# Patient Record
Sex: MALE | Race: BLACK OR AFRICAN AMERICAN | NOT HISPANIC OR LATINO | Employment: FULL TIME | URBAN - METROPOLITAN AREA
[De-identification: names, ages, dates, MRNs, and addresses within clinical notes are randomized per-mention and may not be internally consistent; named-entity substitution may affect disease eponyms.]

---

## 2017-06-30 ENCOUNTER — LAB VISIT (OUTPATIENT)
Dept: LAB | Facility: HOSPITAL | Age: 47
End: 2017-06-30
Attending: INTERNAL MEDICINE
Payer: COMMERCIAL

## 2017-06-30 ENCOUNTER — OFFICE VISIT (OUTPATIENT)
Dept: FAMILY MEDICINE | Facility: CLINIC | Age: 47
End: 2017-06-30
Payer: COMMERCIAL

## 2017-06-30 VITALS
SYSTOLIC BLOOD PRESSURE: 130 MMHG | OXYGEN SATURATION: 97 % | HEART RATE: 71 BPM | DIASTOLIC BLOOD PRESSURE: 82 MMHG | TEMPERATURE: 98 F | HEIGHT: 71 IN | BODY MASS INDEX: 31.39 KG/M2 | WEIGHT: 224.19 LBS

## 2017-06-30 DIAGNOSIS — E11.9 TYPE 2 DIABETES MELLITUS WITHOUT COMPLICATION, WITHOUT LONG-TERM CURRENT USE OF INSULIN: ICD-10-CM

## 2017-06-30 DIAGNOSIS — Z00.00 ROUTINE MEDICAL EXAM: ICD-10-CM

## 2017-06-30 DIAGNOSIS — N52.9 ERECTILE DYSFUNCTION, UNSPECIFIED ERECTILE DYSFUNCTION TYPE: ICD-10-CM

## 2017-06-30 DIAGNOSIS — Z00.00 ROUTINE MEDICAL EXAM: Primary | ICD-10-CM

## 2017-06-30 DIAGNOSIS — Z23 NEED FOR VACCINATION FOR PNEUMOCOCCUS: ICD-10-CM

## 2017-06-30 DIAGNOSIS — E66.9 OBESITY, CLASS I, BMI 30-34.9: ICD-10-CM

## 2017-06-30 LAB
ALBUMIN SERPL BCP-MCNC: 4.4 G/DL
ALP SERPL-CCNC: 103 U/L
ALT SERPL W/O P-5'-P-CCNC: 29 U/L
ANION GAP SERPL CALC-SCNC: 9 MMOL/L
AST SERPL-CCNC: 26 U/L
BASOPHILS # BLD AUTO: 0.02 K/UL
BASOPHILS NFR BLD: 0.3 %
BILIRUB SERPL-MCNC: 0.6 MG/DL
BUN SERPL-MCNC: 10 MG/DL
CALCIUM SERPL-MCNC: 9.8 MG/DL
CHLORIDE SERPL-SCNC: 101 MMOL/L
CHOLEST/HDLC SERPL: 5.1 {RATIO}
CO2 SERPL-SCNC: 27 MMOL/L
CREAT SERPL-MCNC: 1.2 MG/DL
DIFFERENTIAL METHOD: NORMAL
EOSINOPHIL # BLD AUTO: 0.2 K/UL
EOSINOPHIL NFR BLD: 2.3 %
ERYTHROCYTE [DISTWIDTH] IN BLOOD BY AUTOMATED COUNT: 14 %
EST. GFR  (AFRICAN AMERICAN): >60 ML/MIN/1.73 M^2
EST. GFR  (NON AFRICAN AMERICAN): >60 ML/MIN/1.73 M^2
GLUCOSE SERPL-MCNC: 124 MG/DL
HCT VFR BLD AUTO: 45.2 %
HDL/CHOLESTEROL RATIO: 19.7 %
HDLC SERPL-MCNC: 193 MG/DL
HDLC SERPL-MCNC: 38 MG/DL
HGB BLD-MCNC: 14.7 G/DL
LDLC SERPL CALC-MCNC: 106 MG/DL
LYMPHOCYTES # BLD AUTO: 3.6 K/UL
LYMPHOCYTES NFR BLD: 47.1 %
MCH RBC QN AUTO: 28 PG
MCHC RBC AUTO-ENTMCNC: 32.5 %
MCV RBC AUTO: 86 FL
MONOCYTES # BLD AUTO: 0.7 K/UL
MONOCYTES NFR BLD: 8.9 %
NEUTROPHILS # BLD AUTO: 3.2 K/UL
NEUTROPHILS NFR BLD: 41.3 %
NONHDLC SERPL-MCNC: 155 MG/DL
PLATELET # BLD AUTO: 286 K/UL
PMV BLD AUTO: 10.2 FL
POTASSIUM SERPL-SCNC: 4.8 MMOL/L
PROT SERPL-MCNC: 8.2 G/DL
RBC # BLD AUTO: 5.25 M/UL
SODIUM SERPL-SCNC: 137 MMOL/L
TRIGL SERPL-MCNC: 245 MG/DL
WBC # BLD AUTO: 7.66 K/UL

## 2017-06-30 PROCEDURE — 80061 LIPID PANEL: CPT

## 2017-06-30 PROCEDURE — 83036 HEMOGLOBIN GLYCOSYLATED A1C: CPT

## 2017-06-30 PROCEDURE — 99396 PREV VISIT EST AGE 40-64: CPT | Mod: 25,S$GLB,, | Performed by: INTERNAL MEDICINE

## 2017-06-30 PROCEDURE — 85025 COMPLETE CBC W/AUTO DIFF WBC: CPT

## 2017-06-30 PROCEDURE — 80053 COMPREHEN METABOLIC PANEL: CPT

## 2017-06-30 PROCEDURE — 90732 PPSV23 VACC 2 YRS+ SUBQ/IM: CPT | Mod: S$GLB,,, | Performed by: INTERNAL MEDICINE

## 2017-06-30 PROCEDURE — 99999 PR PBB SHADOW E&M-EST. PATIENT-LVL III: CPT | Mod: PBBFAC,,, | Performed by: INTERNAL MEDICINE

## 2017-06-30 PROCEDURE — 90471 IMMUNIZATION ADMIN: CPT | Mod: S$GLB,,, | Performed by: INTERNAL MEDICINE

## 2017-06-30 PROCEDURE — 36415 COLL VENOUS BLD VENIPUNCTURE: CPT | Mod: PO

## 2017-06-30 RX ORDER — METFORMIN HYDROCHLORIDE 1000 MG/1
1000 TABLET ORAL 2 TIMES DAILY WITH MEALS
Qty: 180 TABLET | Refills: 3 | Status: SHIPPED | OUTPATIENT
Start: 2017-06-30 | End: 2018-08-24 | Stop reason: SDUPTHER

## 2017-06-30 RX ORDER — TADALAFIL 20 MG/1
20 TABLET ORAL DAILY
Qty: 6 TABLET | Refills: 5 | Status: SHIPPED | OUTPATIENT
Start: 2017-06-30 | End: 2019-03-20

## 2017-06-30 NOTE — PROGRESS NOTES
Pneumonia vacine 23 given tolerated well. VIS given Patient instructed to wait 15 minutes for monitoring

## 2017-06-30 NOTE — PATIENT INSTRUCTIONS
Below is a list of free Apps that you may find helpful (some of them may not apply to you since this is a general list of helpful Apps):    Android & Apple users:  Fooducate - Helps with healthy diet and weight loss  Shop Well - Scan barcodes to foods to see if it is healthy or not.  It will also suggest healthier alternatives  Lose It - Calorie tracking and goal setting for weight loss.  Peer support is also available  Calorie Counter and Diet Tracker by MyFitnessPal - Helps count calories for food intake and calories burned during exercise  Popsugar Active - has pictures and videos of preloaded workout routines  [facility] Diabetes Pal - log for home sugars, diet, weight, and blood pressure  Headspace - Guides your through meditation.  The first 10 programs are free.

## 2017-06-30 NOTE — PROGRESS NOTES
Chief Complaint  Chief Complaint   Patient presents with    Annual Exam       HPI  Christophe Carroll is a 46 y.o. male with multiple medical diagnoses as listed in the medical history and problem list that presents for routine physical.  Pt is known to me with his last appointment 08/2016.  I had seen him once before for a bursitis in 2014.  The patient was previously followed by one of my partners that has since retired .  I have reviewed their most recent previous OV with their previous PCP, most recent labs and most recent imaging studies and interpreted them myself. No acute complaints.  He reports that he wants to get himself in line with his DM follow ups.      PAST MEDICAL HISTORY:  Past Medical History:   Diagnosis Date    Diabetes mellitus type II     Hypertension        PAST SURGICAL HISTORY:  Past Surgical History:   Procedure Laterality Date    removal of epididymal cyst         SOCIAL HISTORY:  Social History     Social History    Marital status:      Spouse name: N/A    Number of children: N/A    Years of education: N/A     Occupational History     [employer]     Social History Main Topics    Smoking status: Never Smoker    Smokeless tobacco: Never Used    Alcohol use 0.0 oz/week      Comment: occasional    Drug use: No    Sexual activity: Yes     Partners: Female      Comment:      Other Topics Concern    Not on file     Social History Narrative    No narrative on file       FAMILY HISTORY:  Family History   Problem Relation Age of Onset    Diabetes Mother     Diabetes Maternal Aunt     Kidney disease Maternal Aunt     Diabetes Maternal Grandmother        ALLERGIES AND MEDICATIONS: updated and reviewed.  Review of patient's allergies indicates:   Allergen Reactions    Lisinopril Rash    Penicillins Swelling     Current Outpatient Prescriptions   Medication Sig Dispense Refill    metformin (GLUCOPHAGE) 1000 MG tablet Take 1 tablet (1,000 mg total) by mouth  2 (two) times daily with meals. 180 tablet 3    TRAVATAN Z 0.004 % Drop   6    tadalafil (CIALIS) 20 MG Tab Take 1 tablet (20 mg total) by mouth once daily. 6 tablet 5     No current facility-administered medications for this visit.          ROS  Review of Systems   Constitutional: Negative for chills, fatigue, fever and unexpected weight change.   HENT: Negative for congestion, dental problem, ear discharge, ear pain, hearing loss, postnasal drip, rhinorrhea, sinus pressure, sore throat and trouble swallowing.    Eyes: Negative for pain, discharge, redness, itching and visual disturbance.   Respiratory: Negative for cough, chest tightness, shortness of breath and wheezing.    Cardiovascular: Negative for chest pain, palpitations and leg swelling.   Gastrointestinal: Negative for abdominal distention, abdominal pain, blood in stool, constipation, diarrhea, nausea and vomiting.        No melena   Endocrine: Negative for cold intolerance, heat intolerance, polydipsia, polyphagia and polyuria.        No nocturia   Genitourinary: Negative for decreased urine volume, difficulty urinating, discharge, dysuria, flank pain, frequency, hematuria, penile pain, testicular pain and urgency.   Musculoskeletal: Negative for arthralgias, gait problem, joint swelling, myalgias, neck pain and neck stiffness.   Skin: Negative for color change, pallor and rash.   Neurological: Negative for dizziness, tremors, seizures, syncope, weakness, light-headedness and headaches.   Hematological: Negative for adenopathy. Does not bruise/bleed easily.   Psychiatric/Behavioral: Negative for confusion, decreased concentration, sleep disturbance and suicidal ideas. The patient is not nervous/anxious.         No depression           Physical Exam  Vitals:    06/30/17 1126   BP: 130/82   BP Location: Left arm   Patient Position: Sitting   BP Method: Manual   Pulse: 71   Temp: 97.8 °F (36.6 °C)   TempSrc: Oral   SpO2: 97%   Weight: 101.7 kg (224 lb  "3.3 oz)   Height: 5' 11" (1.803 m)    Body mass index is 31.27 kg/m².  Weight: 101.7 kg (224 lb 3.3 oz)   Height: 5' 11" (180.3 cm)   General appearance: alert, appears stated age, cooperative and no distress  Head: Normocephalic, without obvious abnormality, atraumatic  Eyes: PERRL EOMI conjunctiva clear  Ears: normal TM's and external ear canals both ears  Nose: Nares normal. Septum midline. Mucosa normal. No drainage or sinus tenderness.  Throat: lips, mucosa, and tongue normal; teeth and gums normal  Neck: no adenopathy, no carotid bruit, no JVD, supple, symmetrical, trachea midline and thyroid not enlarged, symmetric, no tenderness/mass/nodules  Back: symmetric, no curvature. ROM normal. No CVA tenderness.  Lungs: clear to auscultation bilaterally  Heart: regular rate and rhythm, S1, S2 normal, no murmur, click, rub or gallop  Abdomen: soft, non-tender; bowel sounds normal; no masses,  no organomegaly  Extremities: extremities normal, atraumatic, no cyanosis or edema  Pulses: 2+ and symmetric  Neurologic: Mental status: Alert, oriented, thought content appropriate  Sensory: normal  Motor:grossly normal  Coordination: normal  Gait: Normal  Symmetric facial movements palate elevated symmetrically tongue midline   Protective Sensation (w/ 10 gram monofilament):  Right: Intact  Left: Intact    Visual Inspection:  Normal -  Bilateral    Pedal Pulses:   Right: Present  Left: Present    Posterior tibialis:   Right:Present  Left: Present           Health Maintenance       Date Due Completion Date    TETANUS VACCINE 09/28/1989 ---    Pneumococcal PPSV23 (Medium Risk) (1) 09/28/1989 ---    Urine Microalbumin 12/04/2014 12/4/2013    Hemoglobin A1c 02/23/2017 8/23/2016    Foot Exam 08/23/2017 8/23/2016    Influenza Vaccine 08/01/2017 ---    Lipid Panel 08/23/2017 8/23/2016    Eye Exam 11/30/2017 11/30/2016            Assessment & Plan  Problem List Items Addressed This Visit        Renal    Erectile dysfunction    Current " Assessment & Plan     Mostly performance anxiety.  Trial of cialis as well         Relevant Medications    tadalafil (CIALIS) 20 MG Tab       Endocrine    Type 2 diabetes mellitus without complication, without long-term current use of insulin    Current Assessment & Plan     Recheck labs.  Will adjust medications as needed.  Eye exam in Nov.           Relevant Medications    metformin (GLUCOPHAGE) 1000 MG tablet    Other Relevant Orders    Microalbumin/creatinine urine ratio       Fluids/Electrolytes/Nutrition/GI    Obesity, Class I, BMI 30-34.9    Current Assessment & Plan     The patient is asked to make an attempt to improve diet and exercise patterns to aid in medical management of this problem.            Other Visit Diagnoses     Routine medical exam    -  Primary  -    Discussed healthy diet, regular exercise, necessary labs, age appropriate cancer screening, and routine vaccinations.       Relevant Orders    CBC auto differential    Comprehensive metabolic panel    Lipid panel    Hemoglobin A1c    Need for vaccination for pneumococcus    -  vaccinate    Relevant Orders    Pneumococcal Polysaccharide Vaccine (23 Valent) (SQ/IM)            Health Maintenance reviewed, as above.    Follow-up: Return for 3-6 months pending lab.

## 2017-07-01 LAB
ESTIMATED AVG GLUCOSE: 166 MG/DL
HBA1C MFR BLD HPLC: 7.4 %

## 2017-07-03 ENCOUNTER — PATIENT MESSAGE (OUTPATIENT)
Dept: FAMILY MEDICINE | Facility: CLINIC | Age: 47
End: 2017-07-03

## 2017-07-03 DIAGNOSIS — E78.5 HYPERLIPIDEMIA ASSOCIATED WITH TYPE 2 DIABETES MELLITUS: ICD-10-CM

## 2017-07-03 DIAGNOSIS — E11.69 HYPERLIPIDEMIA ASSOCIATED WITH TYPE 2 DIABETES MELLITUS: ICD-10-CM

## 2017-07-03 DIAGNOSIS — E11.9 TYPE 2 DIABETES MELLITUS WITHOUT COMPLICATION, WITHOUT LONG-TERM CURRENT USE OF INSULIN: ICD-10-CM

## 2017-07-03 RX ORDER — ATORVASTATIN CALCIUM 20 MG/1
20 TABLET, FILM COATED ORAL DAILY
Qty: 90 TABLET | Refills: 3 | Status: SHIPPED | OUTPATIENT
Start: 2017-07-03 | End: 2018-08-24 | Stop reason: SDUPTHER

## 2017-07-06 ENCOUNTER — PATIENT MESSAGE (OUTPATIENT)
Dept: FAMILY MEDICINE | Facility: CLINIC | Age: 47
End: 2017-07-06

## 2017-07-06 DIAGNOSIS — E11.9 TYPE 2 DIABETES MELLITUS WITHOUT COMPLICATION, WITHOUT LONG-TERM CURRENT USE OF INSULIN: Primary | ICD-10-CM

## 2017-07-07 ENCOUNTER — PATIENT MESSAGE (OUTPATIENT)
Dept: FAMILY MEDICINE | Facility: CLINIC | Age: 47
End: 2017-07-07

## 2017-07-07 DIAGNOSIS — E11.9 TYPE 2 DIABETES MELLITUS WITHOUT COMPLICATION, WITHOUT LONG-TERM CURRENT USE OF INSULIN: ICD-10-CM

## 2018-01-19 DIAGNOSIS — E11.9 TYPE 2 DIABETES MELLITUS WITHOUT COMPLICATION: ICD-10-CM

## 2018-04-16 ENCOUNTER — OFFICE VISIT (OUTPATIENT)
Dept: FAMILY MEDICINE | Facility: CLINIC | Age: 48
End: 2018-04-16
Payer: COMMERCIAL

## 2018-04-16 VITALS
TEMPERATURE: 98 F | BODY MASS INDEX: 32.03 KG/M2 | DIASTOLIC BLOOD PRESSURE: 78 MMHG | OXYGEN SATURATION: 99 % | HEIGHT: 71 IN | SYSTOLIC BLOOD PRESSURE: 126 MMHG | HEART RATE: 82 BPM | WEIGHT: 228.81 LBS

## 2018-04-16 DIAGNOSIS — S39.012A LUMBAR STRAIN, INITIAL ENCOUNTER: Primary | ICD-10-CM

## 2018-04-16 PROCEDURE — 3074F SYST BP LT 130 MM HG: CPT | Mod: CPTII,S$GLB,, | Performed by: INTERNAL MEDICINE

## 2018-04-16 PROCEDURE — 3078F DIAST BP <80 MM HG: CPT | Mod: CPTII,S$GLB,, | Performed by: INTERNAL MEDICINE

## 2018-04-16 PROCEDURE — 99213 OFFICE O/P EST LOW 20 MIN: CPT | Mod: S$GLB,,, | Performed by: INTERNAL MEDICINE

## 2018-04-16 PROCEDURE — 99999 PR PBB SHADOW E&M-EST. PATIENT-LVL III: CPT | Mod: PBBFAC,,, | Performed by: INTERNAL MEDICINE

## 2018-04-16 RX ORDER — CYCLOBENZAPRINE HCL 10 MG
10 TABLET ORAL NIGHTLY
Qty: 7 TABLET | Refills: 0 | Status: SHIPPED | OUTPATIENT
Start: 2018-04-16 | End: 2018-04-23

## 2018-04-16 NOTE — PROGRESS NOTES
This note was created by combination of typed  and Dragon dictation.  Transcription errors may be present.  If there are any questions, please contact me.    Assessment & Plan:   Lumbar strain, initial encounter - not c/w sciatica.  Trial of flexeril nighttime use. Home PT handout.  May continue ibuprofen 800 mg BID to TID with food. Cool packs.   -     cyclobenzaprine (FLEXERIL) 10 MG tablet; Take 1 tablet (10 mg total) by mouth every evening.  Dispense: 7 tablet; Refill: 0        There are no discontinued medications.  Modified Medications    No medications on file     New Prescriptions    CYCLOBENZAPRINE (FLEXERIL) 10 MG TABLET    Take 1 tablet (10 mg total) by mouth every evening.       Follow Up: No Follow-up on file.        Subjective:     Chief Complaint   Patient presents with    Back Pain       HPI  Christophe is a 47 y.o. male, last appointment with this clinic was Visit date not found.    Yesterday around noon was in the kitchen facing the sink, turned to his right to the stove and sudden onset of pain.  Was not carrying anythging.  Similar episode a few years ago which was limited.  But this did not stop.  Hard for him to walk and sit initially. Today able to sit.  Only able to lie down yesterday.  Hard to turn onto his sides.  Had ibuprofen 800 mg and took 1 yesterday with minimal relief. 2 hours later - took a tylenol.  Pain all night long.  Took ibuprofen this AM. Compared to yesterday not as painful.  It was a sharp pain.  On the side, lateral to the spine.  Nonradiating.  The previous instance of this - was more in the lower medial spine and this is more lateral.  No radiation inferiorly nor to the groin. No pain with urination or BM no numbness.      Patient Care Team:  Moris Menchaca MD as PCP - General (Internal Medicine)    Patient Active Problem List    Diagnosis Date Noted    Obesity, Class I, BMI 30-34.9 08/23/2016    Erectile dysfunction 10/24/2014    Type 2 diabetes mellitus  without complication, without long-term current use of insulin 11/02/2012    Hyperlipidemia associated with type 2 diabetes mellitus 11/02/2012       PAST MEDICAL HISTORY:  Past Medical History:   Diagnosis Date    Diabetes mellitus type II     Hypertension        PAST SURGICAL HISTORY:  Past Surgical History:   Procedure Laterality Date    removal of epididymal cyst         SOCIAL HISTORY:  Social History     Social History    Marital status:      Spouse name: N/A    Number of children: N/A    Years of education: N/A     Occupational History     [employer]     Social History Main Topics    Smoking status: Never Smoker    Smokeless tobacco: Never Used    Alcohol use 0.0 oz/week      Comment: occasional    Drug use: No    Sexual activity: Yes     Partners: Female      Comment:      Other Topics Concern    Not on file     Social History Narrative    No narrative on file       ALLERGIES AND MEDICATIONS: updated and reviewed.  Review of patient's allergies indicates:   Allergen Reactions    Lisinopril Rash    Penicillins Swelling     Current Outpatient Prescriptions   Medication Sig Dispense Refill    atorvastatin (LIPITOR) 20 MG tablet Take 1 tablet (20 mg total) by mouth once daily. 90 tablet 3    linagliptin (TRADJENTA) 5 mg Tab tablet Take 1 tablet (5 mg total) by mouth once daily. 90 tablet 3    metformin (GLUCOPHAGE) 1000 MG tablet Take 1 tablet (1,000 mg total) by mouth 2 (two) times daily with meals. 180 tablet 3    TRAVATAN Z 0.004 % Drop   6    tadalafil (CIALIS) 20 MG Tab Take 1 tablet (20 mg total) by mouth once daily. 6 tablet 5     No current facility-administered medications for this visit.        Review of Systems   Constitutional: Negative for chills and fever.   Gastrointestinal: Negative for abdominal pain.   Genitourinary: Negative for dysuria.       Objective:   Physical Exam   Vitals:    04/16/18 1045   BP: 126/78   Pulse: 82   Temp: 97.9 °F (36.6 °C)  "  TempSrc: Oral   SpO2: 99%   Weight: 103.8 kg (228 lb 13.4 oz)   Height: 5' 11" (1.803 m)    Body mass index is 31.92 kg/m².  Weight: 103.8 kg (228 lb 13.4 oz)   Height: 5' 11" (180.3 cm)     Physical Exam   Constitutional: He is oriented to person, place, and time. He appears well-developed and well-nourished. No distress.   Musculoskeletal:   Right paraspinal muscle laterally is TTP, superior to the SI joint, the SI joint nontender, the L spine nontender.  Straight leg raise with pain localizing in the left lateral paraspinal muscle area.   Neurological: He is alert and oriented to person, place, and time.   Psychiatric: He has a normal mood and affect. His behavior is normal. Thought content normal.     "

## 2018-05-02 ENCOUNTER — OFFICE VISIT (OUTPATIENT)
Dept: FAMILY MEDICINE | Facility: CLINIC | Age: 48
End: 2018-05-02
Payer: COMMERCIAL

## 2018-05-02 VITALS
OXYGEN SATURATION: 99 % | WEIGHT: 230 LBS | BODY MASS INDEX: 32.2 KG/M2 | TEMPERATURE: 98 F | DIASTOLIC BLOOD PRESSURE: 70 MMHG | SYSTOLIC BLOOD PRESSURE: 138 MMHG | HEIGHT: 71 IN | HEART RATE: 110 BPM

## 2018-05-02 DIAGNOSIS — M54.50 ACUTE RIGHT-SIDED LOW BACK PAIN WITHOUT SCIATICA: Primary | ICD-10-CM

## 2018-05-02 DIAGNOSIS — E11.9 TYPE 2 DIABETES MELLITUS WITHOUT COMPLICATION, WITHOUT LONG-TERM CURRENT USE OF INSULIN: ICD-10-CM

## 2018-05-02 DIAGNOSIS — E78.5 HYPERLIPIDEMIA ASSOCIATED WITH TYPE 2 DIABETES MELLITUS: ICD-10-CM

## 2018-05-02 DIAGNOSIS — E11.69 HYPERLIPIDEMIA ASSOCIATED WITH TYPE 2 DIABETES MELLITUS: ICD-10-CM

## 2018-05-02 PROCEDURE — 3075F SYST BP GE 130 - 139MM HG: CPT | Mod: CPTII,S$GLB,, | Performed by: INTERNAL MEDICINE

## 2018-05-02 PROCEDURE — 3045F PR MOST RECENT HEMOGLOBIN A1C LEVEL 7.0-9.0%: CPT | Mod: CPTII,S$GLB,, | Performed by: INTERNAL MEDICINE

## 2018-05-02 PROCEDURE — 3078F DIAST BP <80 MM HG: CPT | Mod: CPTII,S$GLB,, | Performed by: INTERNAL MEDICINE

## 2018-05-02 PROCEDURE — 99999 PR PBB SHADOW E&M-EST. PATIENT-LVL III: CPT | Mod: PBBFAC,,, | Performed by: INTERNAL MEDICINE

## 2018-05-02 PROCEDURE — 96372 THER/PROPH/DIAG INJ SC/IM: CPT | Mod: 59,S$GLB,, | Performed by: INTERNAL MEDICINE

## 2018-05-02 PROCEDURE — 99214 OFFICE O/P EST MOD 30 MIN: CPT | Mod: 25,S$GLB,, | Performed by: INTERNAL MEDICINE

## 2018-05-02 RX ORDER — TIZANIDINE 4 MG/1
4 TABLET ORAL NIGHTLY PRN
Qty: 30 TABLET | Refills: 0 | Status: SHIPPED | OUTPATIENT
Start: 2018-05-02 | End: 2018-05-12

## 2018-05-02 RX ORDER — DICLOFENAC SODIUM 50 MG/1
50 TABLET, DELAYED RELEASE ORAL 2 TIMES DAILY
Qty: 60 TABLET | Refills: 1 | Status: SHIPPED | OUTPATIENT
Start: 2018-05-02 | End: 2019-01-02

## 2018-05-02 RX ORDER — KETOROLAC TROMETHAMINE 30 MG/ML
60 INJECTION, SOLUTION INTRAMUSCULAR; INTRAVENOUS
Status: COMPLETED | OUTPATIENT
Start: 2018-05-02 | End: 2018-05-02

## 2018-05-02 RX ADMIN — KETOROLAC TROMETHAMINE 60 MG: 30 INJECTION, SOLUTION INTRAMUSCULAR; INTRAVENOUS at 11:05

## 2018-05-02 NOTE — ASSESSMENT & PLAN NOTE
Recheck labs.  The patient is asked to make an attempt to improve diet and exercise patterns to aid in medical management of this problem.

## 2018-05-02 NOTE — PROGRESS NOTES
Assessment & Plan  Problem List Items Addressed This Visit        Cardiac/Vascular    Hyperlipidemia associated with type 2 diabetes mellitus (Chronic)    Current Assessment & Plan     Recheck labs         Relevant Orders    Comprehensive metabolic panel    Lipid panel       Endocrine    Type 2 diabetes mellitus without complication, without long-term current use of insulin (Chronic)    Current Assessment & Plan     Recheck labs.  The patient is asked to make an attempt to improve diet and exercise patterns to aid in medical management of this problem.          Relevant Orders    Hemoglobin A1c    Microalbumin/creatinine urine ratio      Other Visit Diagnoses     Acute right-sided low back pain without sciatica    -  Primary  -    Toradol now.  Stop flexeril and ibuprofen due to poor response.  Will change to diclofenac and tizanidine.  Counseled on heat and Salonpas.  Start new type of physician directed home PT.      Relevant Medications    ketorolac injection 60 mg (Completed)    diclofenac (VOLTAREN) 50 MG EC tablet    tiZANidine (ZANAFLEX) 4 MG tablet            Health Maintenance reviewed, as above.    Follow-up: Follow-up for pending labs.    ______________________________________________________________________    Chief Complaint  Chief Complaint   Patient presents with    Back Pain     f/u    Diabetes     f/u    Hyperlipidemia     f/u       HPI  Christophe Eugene Carroll is a 47 y.o. male with multiple medical diagnoses as listed in the medical history and problem list that presents for HLD and DM follow up.  Pt is known to me with his last appointment 4/16/2018.  He was last seen by me on 06/2017    He reports that he had a twisting injury that occurred with immediate onset of pain.  He was seen by one of my partners and was prescribed flexeril and ibuprofen 800mg.  He states that the ibuprofen is helping only a little.  Flexeril seems to allow sleep but doesn't help during the day.  Using heat without much  improvement.  Low back stretches are now causing some pain.  No fevers, saddle anesthesia, urinary/stool incontinence, LE numbness or weakness.    He is taking and tolerating his diabetes medications.  Has been off of his diet and exercise routine since Dec.  Got sick and out of the habit.  No hypoglycemic symptoms.  No CP, SOB, palpitations, polyuria, polydipsia, polyphagia      PAST MEDICAL HISTORY:  Past Medical History:   Diagnosis Date    Diabetes mellitus type II     Hypertension        PAST SURGICAL HISTORY:  Past Surgical History:   Procedure Laterality Date    removal of epididymal cyst         SOCIAL HISTORY:  Social History     Social History    Marital status:      Spouse name: N/A    Number of children: N/A    Years of education: N/A     Occupational History     [employer]     Social History Main Topics    Smoking status: Never Smoker    Smokeless tobacco: Never Used    Alcohol use 0.0 oz/week      Comment: occasional    Drug use: No    Sexual activity: Yes     Partners: Female      Comment:      Other Topics Concern    Not on file     Social History Narrative    No narrative on file       FAMILY HISTORY:  Family History   Problem Relation Age of Onset    Diabetes Mother     Diabetes Maternal Aunt     Kidney disease Maternal Aunt     Diabetes Maternal Grandmother        ALLERGIES AND MEDICATIONS: updated and reviewed.  Review of patient's allergies indicates:   Allergen Reactions    Lisinopril Rash    Penicillins Swelling     Current Outpatient Prescriptions   Medication Sig Dispense Refill    atorvastatin (LIPITOR) 20 MG tablet Take 1 tablet (20 mg total) by mouth once daily. 90 tablet 3    linagliptin (TRADJENTA) 5 mg Tab tablet Take 1 tablet (5 mg total) by mouth once daily. 90 tablet 3    metformin (GLUCOPHAGE) 1000 MG tablet Take 1 tablet (1,000 mg total) by mouth 2 (two) times daily with meals. 180 tablet 3    TRAVATAN Z 0.004 % Drop   6    diclofenac  "(VOLTAREN) 50 MG EC tablet Take 1 tablet (50 mg total) by mouth 2 (two) times daily. Take with food 60 tablet 1    tadalafil (CIALIS) 20 MG Tab Take 1 tablet (20 mg total) by mouth once daily. 6 tablet 5    tiZANidine (ZANAFLEX) 4 MG tablet Take 1 tablet (4 mg total) by mouth nightly as needed. 30 tablet 0     No current facility-administered medications for this visit.          ROS  Review of Systems   Constitutional: Negative for chills, fever and unexpected weight change.   HENT: Negative for congestion, dental problem, ear pain, hearing loss, rhinorrhea, sore throat and trouble swallowing.    Eyes: Negative for discharge, redness and visual disturbance.   Respiratory: Negative for cough, chest tightness, shortness of breath and wheezing.    Cardiovascular: Negative for chest pain, palpitations and leg swelling.   Gastrointestinal: Negative for abdominal pain, constipation, diarrhea, nausea and vomiting.   Endocrine: Negative for polydipsia, polyphagia and polyuria.   Genitourinary: Negative for decreased urine volume, dysuria and hematuria.   Musculoskeletal: Positive for back pain. Negative for arthralgias and myalgias.   Skin: Negative for color change and rash.   Neurological: Negative for dizziness, weakness, light-headedness and headaches.   Psychiatric/Behavioral: Negative for decreased concentration, dysphoric mood, sleep disturbance and suicidal ideas.           Physical Exam  Vitals:    05/02/18 1110   BP: 138/70   Pulse: 110   Temp: 98 °F (36.7 °C)   SpO2: 99%   Weight: 104.3 kg (230 lb)   Height: 5' 11" (1.803 m)    Body mass index is 32.08 kg/m².  Weight: 104.3 kg (230 lb)   Height: 5' 11" (180.3 cm)   Physical Exam   Constitutional: He is oriented to person, place, and time. He appears well-developed and well-nourished. No distress.   HENT:   Head: Normocephalic and atraumatic.   Eyes: Conjunctivae, EOM and lids are normal. Pupils are equal, round, and reactive to light. No scleral icterus. "   Neck: Full passive range of motion without pain. Neck supple. No JVD present. Carotid bruit is not present. No thyromegaly present.   Cardiovascular: Normal rate, regular rhythm, normal heart sounds and intact distal pulses.  Exam reveals no S3, no S4 and no friction rub.    No murmur heard.  Pulmonary/Chest: Effort normal and breath sounds normal. He has no wheezes. He has no rhonchi. He has no rales.   Abdominal: Soft. Bowel sounds are normal. There is no tenderness.   Musculoskeletal: He exhibits no edema.        Cervical back: He exhibits no tenderness and no bony tenderness.        Thoracic back: He exhibits no tenderness and no bony tenderness.        Lumbar back: He exhibits tenderness (right paraspinous and lumbar). He exhibits no bony tenderness.   Lymphadenopathy:        Head (right side): No submental and no submandibular adenopathy present.        Head (left side): No submental and no submandibular adenopathy present.     He has no cervical adenopathy.        Right: No supraclavicular adenopathy present.        Left: No supraclavicular adenopathy present.   Neurological: He is alert and oriented to person, place, and time.   Motor grossly intact.  Sensory grossly intact.  Symmetric facial movements palate elevated symmetrically tongue midline   Skin: Skin is warm and dry. No rash noted.   Psychiatric: He has a normal mood and affect. His speech is normal and behavior is normal. Thought content normal.         Health Maintenance       Date Due Completion Date    TETANUS VACCINE 09/28/1989 ---    Hemoglobin A1c 12/30/2017 6/30/2017    Foot Exam 06/30/2018 6/30/2017    Urine Microalbumin 06/30/2018 6/30/2017    Lipid Panel 06/30/2018 6/30/2017    Eye Exam 07/10/2018 7/10/2017    Influenza Vaccine 08/01/2018 ---    Low Dose Statin 04/16/2019 4/16/2018    Pneumococcal PPSV23 (Medium Risk) (2) 09/28/2036 6/30/2017

## 2018-05-04 ENCOUNTER — LAB VISIT (OUTPATIENT)
Dept: LAB | Facility: HOSPITAL | Age: 48
End: 2018-05-04
Attending: INTERNAL MEDICINE
Payer: COMMERCIAL

## 2018-05-04 DIAGNOSIS — E11.9 TYPE 2 DIABETES MELLITUS WITHOUT COMPLICATION, WITHOUT LONG-TERM CURRENT USE OF INSULIN: ICD-10-CM

## 2018-05-04 LAB
CREAT UR-MCNC: 257 MG/DL
MICROALBUMIN UR DL<=1MG/L-MCNC: 9 UG/ML
MICROALBUMIN/CREATININE RATIO: 3.5 UG/MG

## 2018-05-04 PROCEDURE — 82043 UR ALBUMIN QUANTITATIVE: CPT

## 2018-05-06 ENCOUNTER — PATIENT MESSAGE (OUTPATIENT)
Dept: FAMILY MEDICINE | Facility: CLINIC | Age: 48
End: 2018-05-06

## 2018-05-06 DIAGNOSIS — E11.9 TYPE 2 DIABETES MELLITUS WITHOUT COMPLICATION, WITHOUT LONG-TERM CURRENT USE OF INSULIN: Primary | Chronic | ICD-10-CM

## 2018-05-07 ENCOUNTER — PATIENT MESSAGE (OUTPATIENT)
Dept: FAMILY MEDICINE | Facility: CLINIC | Age: 48
End: 2018-05-07

## 2018-05-07 DIAGNOSIS — M54.50 ACUTE RIGHT-SIDED LOW BACK PAIN WITHOUT SCIATICA: Primary | ICD-10-CM

## 2018-05-14 ENCOUNTER — CLINICAL SUPPORT (OUTPATIENT)
Dept: REHABILITATION | Facility: HOSPITAL | Age: 48
End: 2018-05-14
Attending: INTERNAL MEDICINE
Payer: COMMERCIAL

## 2018-05-14 DIAGNOSIS — M54.50 ACUTE RIGHT-SIDED LOW BACK PAIN WITHOUT SCIATICA: ICD-10-CM

## 2018-05-14 DIAGNOSIS — R29.3 POSTURE IMBALANCE: ICD-10-CM

## 2018-05-14 PROCEDURE — 97110 THERAPEUTIC EXERCISES: CPT | Mod: PN

## 2018-05-14 PROCEDURE — 97140 MANUAL THERAPY 1/> REGIONS: CPT | Mod: PN

## 2018-05-14 PROCEDURE — 97162 PT EVAL MOD COMPLEX 30 MIN: CPT | Mod: PN

## 2018-05-14 NOTE — PLAN OF CARE
TIME RECORD    Date: 05/14/2018    Start Time:  8:40 - 10 min late  Stop Time:  9:30  Total Timed Minutes:  50 min      OUTPATIENT PHYSICAL THERAPY   PATIENT EVALUATION  Onset Date: 1 month ago  Primary Diagnosis:   1. Acute right-sided low back pain without sciatica     2. Posture imbalance       Treatment Diagnosis: low back pain (right), decreased MF and ST mobility and flexibility, decreased muscle flexibility and ROM, decreased strength, poor postural awareness and strength  Past Medical History:   Diagnosis Date    Diabetes mellitus type II     Hypertension      Precautions: Type 2 DM, Overweight (BMI 32.1 kg/m2), HTN  Prior Therapy: none for c/c  Medications: Christophe Carroll has a current medication list which includes the following prescription(s): atorvastatin, diclofenac, linagliptin, metformin, tadalafil, and travatan z.  History of Present Illness: acute onset x 1 month ago. Twisting JACLYN.  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 Recreational - working out / exercising (beach body)  Place of Residence (Steps/Adaptations): 2-level home with wife  Functional Deficits Leading to Referral/Nature of Injury: pain and difficulty with ADLs, HHCs, work-related and recreational activities  Patient Therapy Goals:Get back to working out    Subjective     Christophe Carroll states that 1 month ago he was standing in his kitchen when he went to turn and felt a pull in his back. Pt reports increased pain and difficulty with movement immediately afterwards. MD gave him muscle relaxors and pain medication that has helped somewhat to reduce his symptoms. 1 episode of sharp shooting pain down the R leg with numbness in the lower leg when sitting in a recliner chair at the movies; pt reports symptoms resolved while laying supine. Hx of a similar episode 1-2 years ago, pain was relieved with an injection for a muscle spasm.  "    Pain:  Location: R low back (near the beltline)  Description: Aching, Dull, Variable and tightness  Activities Which Increase Pain: prolonged sitting, prolonged standing, bending over (donning shoes/socks), sit to stand transfers, heavy lifting with work (PC, CPU)  Activities Which Decrease Pain: pain medication and muscle relaxor, antiinflammatories, HEP given by MD (cat/cow, SKTC)  Pain Scale: 1/10 at best 2/10 now  2/10 at worst    Objective       Sensation: intact  DTRs: 2+  Postural examination/scapula alignment: Rounded shoulder, Head forward, Increased kyphosis and Decreased lordosis  Palpation: TTP with increased tone to R lumbar paraspinal, QL and R SIJ      Thoracic/Lumbar AROM: Pain/Dysfunction with Movement:   Flexion WFL, pain at endrange in R LB  Repeated standing: better   Extension Min-Mod corona, endrange "tension" but better  Repeated standing: better   Right side bending WFL, no pain   Left side bending Min corona, "tension" in R LB   Right rotation Min corona, "tension" in R LB   Left rotation Min corona, "tension" in R LB     Hip ROM: WFL, limited hip ext (mod)  Knee ROM: WNL    Lower Extremity Strength  Right LE  Left LE    Hip flexion: 4+/5 Hip flexion: 4+/5   Hip extension:  4+/5 Hip extension: 4+/5   Hip abduction: 4+/5 Hip abduction: 4+/5   Hip adduction:  5/5 Hip adduction:  5/5   Hip Internal rotation   5/5 Hip Internal rotation 5/5   Knee Flexion 5/5 Knee Flexion 5/5   Knee Extension 5/5 Knee Extension 5/5   Ankle dorsiflexion: 5/5 Ankle dorsiflexion: 5/5   Ankle plantarflexion: 5/5 Ankle plantarflexion: 5/5     Flexibility:   Vitaly test   Hip flexors: fair   Quads: fair  Fátima test   ITB: NT  Hamstring (SLR): Fair    Joint Mobility: NT 2* to time constraints    Special Tests:   Test Name  Test Result   Prone Instability Test NT 2* to time constraints   Lumbar Quadrant test (+) on R   Straight Leg Raise (+) on R   Neural Tension Test (Slump) (--)   Crossed Straight Leg Raise (+) on R   Walking " "on toes (--)   Walking on heels  (--)   Clonus (--)   FLAVIO (--)   FADIR (--)   SI Joint Provocation Test (compression / distraction) (--)     Gait: Without AD  Analysis: Assistance x Indep.  Bed Mobility:Independent  Transfers: Independent    Other: FOTO limitation = 28% disability  Examination time: 20 min    Treatment:   Standing extensions: 10x  L SL positional distraction: 3 min  LTR 10x  DKTC 10x  Figure 4 piriformis stretch: 3 x 20"    Manual therapy: 10 min x preparation and application of kinesiotape for R lumbar paraspinal and QL inhibition. Patient received education regarding appropriate care and removal of Kinesiotape. Patient instructed in proper removal techniques if skin irritation occurs.    Patient education: Patient educated regarding pathogenesis, diagnosis, protocol, prognosis, POC, and HEP. Written Home Exercises Provided with written and verbal instructions for frequency and duration of the following exercises: see clinical reference tab. Pt educated on HEP and activity modifications to reduce c/o pain and improve overall function. Pt was educated in posture and body mechanics.  Use of a lumbar roll was recommended and demonstrated here today. Pt also educated on use of modalities prn to reduce c/o pain and dysfunction. Pt educated on clinic's cancellation/no-show policy of missing 3 consecutive PT appointments, which will result in an automatic discharge from therapy services 2* to non-compliance, unless otherwise stated. Patient demo good understanding of the education provided. Patient demo good return demo of skill of exercises.      Assessment       Initial Assessment (Pertinent finding, problem list and factors affecting outcome): Patient presents with acute onset of right low back pain with s/s associated with strain and possible neural tension. Current impairments limits patient with all functional activities. Patient requires skilled PT to address remaining deficits and return patient " to PLOF. Pt has set realistic goals and has verbalized good understanding and agreement with reported diagnosis, prognosis and treatment. Pt demonstrates no additional cultural, spiritual or educational need and currently has no barriers to learning.     Rehab Potiential: good     Medical necessity is demonstrated by the following problem list.  Pt presents with the following impairments:     History  Co-morbidities and personal factors that may impact the plan of care Examination  Body Structures and Functions, activity limitations and participation restrictions that may impact the plan of care Clinical Presentation   Decision Making/ Complexity Score   Co-morbidities:     Type 2 DM, Overweight (BMI 32.1 kg/m2), HTN            Personal Factors:   Age  Lifestyle  Occupation  Financial considerations Body Regions: trunk, back, RLE    Body Systems: Musculoskeletal (ROM, strength, symmetry, joint mobility, soft tissue or myofascial mobility, flexibility), Neuromuscular (postural alignment, body mechanics, balance, gait, transfers, motor control, motor learning), cardiovascular (endurance), Integumentary (skin integrity)    Activity limitations:   Learning and applying knowledge  no deficits    General Tasks and Commands  no deficits    Communication  no deficits    Mobility  lifting and carrying objects  walking  driving (bike, car, motorcycle)    Self care  washing oneself (bathing, drying, washing hands)  caring for body parts (brushing teeth, shaving, grooming)  dressing  looking after one's health    Domestic Life  shopping  cooking  doing house work (cleaning house, washing dishes, laundry)  assisting others    Interactions/Relationships  no deficits    Life Areas  employment    Community and Social Life  community life  recreation and leisure  [employer] and spirituality      Participation Restrictions:            Evolving clinical presentation with changing clinical characteristics            Moderate          FOTO: 28% disability     Short Term Goals (4 Weeks):   1. Pt will report 20% reduction in pain of the lumbar spine and LE for ease with ADL's  2. PT will demonstrate improved trunk strength by a half grade in for ease with upright posture during standing activities.  3. Pt will demonstrate improved lumbar spine ROM in all directions by a half grade for ease with bending activities.   4. Pt to demonstrate improved functional ability with FOTO limitation <=25% disability.    Long Term Goals (8 Weeks):   1. Pt will report being independent with HEP for maintenance of improvements gained during therapy sessions  2. PT will report 50% reduction of pain of the back and LE for ease with dressing and grooming activities.   3. Pt will demonstrate trunk and extremity strength to >=4+/5 without the provocation of pain for ease with household chores  4. Pt will demonstrate appropriate upright posture without external cueing for ease with work related activities.   5. Pt to demonstrate improved functional ability with FOTO limitation <=20% disability.      Plan     Certification Period: 5/14/18 to 8/14/18  Recommended Treatment Plan: 1-2 times per week for 10 weeks: Cervical/Lumbar Traction, Electrical Stimulation prn, Gait Training, Iontophoresis (with dexamethasone prn), Locomotor Training, Manual Therapy, Moist Heat/ Ice, Neuromuscular Re-ed, Patient Education, Self Care, Therapeutic Activites, Therapeutic Exercise and Other IASTYM, therapeutic taping, dry needling, aquatic therapy  Other Recommendations: Progress HEP towards D/C. Recommend F/U with MD if symptoms worsen or do not resolve. Patient may be seen by a PTA for treatment to carry out their plan of care.  Face-to-face conferences will be held.          Therapist: Alba Henry, PT    I CERTIFY THE NEED FOR THESE SERVICES FURNISHED UNDER THIS PLAN OF TREATMENT AND WHILE UNDER MY CARE    Physician's comments:  ________________________________________________________________________________________________________________________________________________      Physician's Name: ___________________________________

## 2018-05-23 ENCOUNTER — CLINICAL SUPPORT (OUTPATIENT)
Dept: REHABILITATION | Facility: HOSPITAL | Age: 48
End: 2018-05-23
Attending: INTERNAL MEDICINE
Payer: COMMERCIAL

## 2018-05-23 DIAGNOSIS — R29.3 POSTURE IMBALANCE: ICD-10-CM

## 2018-05-23 DIAGNOSIS — M54.50 ACUTE RIGHT-SIDED LOW BACK PAIN WITHOUT SCIATICA: ICD-10-CM

## 2018-05-23 PROCEDURE — 97110 THERAPEUTIC EXERCISES: CPT | Mod: PN

## 2018-05-23 NOTE — PROGRESS NOTES
"                                                    Physical Therapy Daily Note     Name: Christophe Knight [place]  Clinic Number: [id number]  Diagnosis:   Encounter Diagnoses   Name Primary?    Acute right-sided low back pain without sciatica     Posture imbalance      Physician: Moris Menchaca MD  Precautions: Type 2 DM, Overweight (BMI 32.1 kg/m2), HTN  Visit #: 2 of 20  PTA Visit #: 0  Time In: 8:30  Time Out: 9:30  Total treatment time: 60 min (1:1 40 min)    Subjective     Pt reports: that he is consistent and compliant with his stretching daily. His c/c is stiffness in the muscle in the morning, but stretching resolves it. Pt notes residual stiffness over L sacral sulcus.  Pain Scale: Christophe rates pain on a scale of 0-10 to be 0 currently. Pt repors <1 at worst.    Objective     Christophe received individual therapeutic exercises to develop strength, endurance, ROM, flexibility, posture and core stabilization for 50 minutes including:  Treadmill: 6 minutes (collected history, assessed pt complaints, education on causes of back pain; importance of exercise)  Standing extensions: 10x  L SL positional distraction: 3 min - NP today  LTR 20x - on PB  DKTC 20x - on PB  Figure 4 piriformis stretch: 2 x 30"  +supine hip flexor stretch with strap: 2 x 30"  +supine hamstring stretch with strap: 2 x 30"  +clams 2 x 10 x GTB  +bridge with band: 2 x 10 x GTB  +PPT: 2 min x 5" holds  +PPT with marches: 20x  +Qped multifidus lift: 15 x 3" hold      Christophe received the following manual therapy techniques: 10 min x preparation and application of kinesiotape for R lumbar paraspinal and QL inhibition. Patient received education regarding appropriate care and removal of Kinesiotape. Patient instructed in proper removal techniques if skin irritation occurs.    Written Home Exercises Provided: none today  Pt demo good understanding of the education provided. Christophe demonstrated good return demonstration of activities.     Education " provided re:  Christophe verbalized good understanding of education provided.   No spiritual or educational barriers to learning provided    Assessment     Patient tolerated overall treatment well. Pt demo's good return technique of HEP, indicating good compliance at home. New exercises added to immprove trunk and pelvic strength and stability. Pt tolerated well with good training effect but needed heavy VC/TC for proper technique with posterior pelvic tilt and core activation.   This is a 47 y.o. male referred to outpatient physical therapy and presents with a medical diagnosis of back pain and demonstrates limitations as described in the problem list. Pt prognosis is Good. Pt will continue to benefit from skilled outpatient physical therapy to address the deficits listed in the problem list, provide pt/family education and to maximize pt's level of independence in the home and community environment.     Goals as follows: See IE on 5/14/18 (PN due by 6/14/18)     Plan     Continue with established Plan of Care towards PT goals.    Therapist: Alba Henry, PT  5/23/2018

## 2018-06-08 ENCOUNTER — CLINICAL SUPPORT (OUTPATIENT)
Dept: REHABILITATION | Facility: HOSPITAL | Age: 48
End: 2018-06-08
Attending: INTERNAL MEDICINE
Payer: COMMERCIAL

## 2018-06-08 DIAGNOSIS — M54.50 ACUTE RIGHT-SIDED LOW BACK PAIN WITHOUT SCIATICA: ICD-10-CM

## 2018-06-08 DIAGNOSIS — R29.3 POSTURE IMBALANCE: ICD-10-CM

## 2018-06-08 PROCEDURE — 97110 THERAPEUTIC EXERCISES: CPT | Mod: PN

## 2018-06-08 NOTE — PROGRESS NOTES
"                                                    Physical Therapy Daily Note     Name: Christophe Knight [place]  Clinic Number: [id number]  Diagnosis:   Encounter Diagnoses   Name Primary?    Acute right-sided low back pain without sciatica     Posture imbalance      Physician: Moris Menchaca MD  Precautions: Type 2 DM, Overweight (BMI 32.1 kg/m2), HTN  Visit #: 3 of 20  PTA Visit #: 0  Time In: 4:00  Time Out: 5:00  Total treatment time: 60 min (1:1 60 min)    Subjective     Pt reports: minor stiffness in R low back but says that he is able to do heavy lifting at work, sit for long periods of time at work, and mow the lawn at home without worsening pain. He is able to work the stiffness out mostly with his HEP. He has not been able to get back to his normal workout routine, which he is anxious to do. Pt notes residual stiffness over L sacral sulcus.  Pain Scale: Christophe rates pain on a scale of 0-10 to be 0 currently. Pt repors <1 at worst.    Objective     Christophe received individual therapeutic exercises to develop strength, endurance, ROM, flexibility, posture and core stabilization for 50 minutes including:  Treadmill: 6 minutes (collected history, assessed pt complaints, education on causes of back pain; importance of exercise)  Standing extensions: 10x - NP  L SL positional distraction: 3 min - NP today  LTR 20x - on PB  DKTC 20x - on PB  Figure 4 piriformis stretch: 2 x 30"  supine hip flexor stretch with strap: 2 x 30"  supine hamstring stretch with strap: 2 x 30"  clams 2 x 10 x GTB  bridge with band: 2 x 10 x GTB  PPT: 2 min x 5" holds  PPT with marches: 20x  Qped multifidus lift: 15 x 3" hold    +Matrix Leg press: 2 x 10 x 160#  +Matrix Rows: 2 x 10 x 80#  +Trunk ext over PB: 2 x 10  +farmers walk: 1 lap (down and back length of gym) x 20# KB - 1 lap for ea UE  +Lawn mowers: 2 x 10 x 13#    Christophe received the following manual therapy techniques:   20 min x integrative dry needling - see note by Alba " Tee, PT  0 min x preparation and application of kinesiotape for R lumbar paraspinal and QL inhibition. Patient received education regarding appropriate care and removal of Kinesiotape. Patient instructed in proper removal techniques if skin irritation occurs.    Written Home Exercises Provided: none today  Pt demo good understanding of the education provided. Christophe demonstrated good return demonstration of activities.     Education provided re:  Christophe verbalized good understanding of education provided.   No spiritual or educational barriers to learning provided    Assessment     Patient tolerated overall treatment well. Pt self reports resolution of symptoms following IDN today. New exercises added to improve LE and trunk strength and muscular activation. Pt required VC/TC for activation of posterior trunk musculature and core musculature to limit compensatory movement at the low back.      This is a 47 y.o. male referred to outpatient physical therapy and presents with a medical diagnosis of back pain and demonstrates limitations as described in the problem list. Pt prognosis is Good. Pt will continue to benefit from skilled outpatient physical therapy to address the deficits listed in the problem list, provide pt/family education and to maximize pt's level of independence in the home and community environment.     Goals as follows: See IE on 5/14/18 (PN due by 6/14/18)     Plan     Continue with established Plan of Care towards PT goals.    Therapist: Alba Henry, PT  6/8/2018

## 2018-06-08 NOTE — PROGRESS NOTES
Dry Needling Daily Note     Patient ID: Christophe Carroll is a 47 y.o. male.  Diagnosis:   1. Acute right-sided low back pain without sciatica     2. Posture imbalance       Date:  06/08/2018    Start Time:  4:15  Stop Time:  4:35    Subjective:     Pt reports: continued stiffness in R low back.  Pain Scale: Christophe rates pain on a scale of 0-10 to be 1 currently.    Objective:     Pt signed written consent to dry needling Rx.  Pt gave verbal consent for DN.   Pt rec'd dry needling to the LSP, R glute with 2-3 in needles with no adverse effects.    Homeostatic points:  1. Deep Radial  2. Greater Auricular  3. Spinal Accessory  4. Saphenous  5. Deep Fibular  6. Tibial  7. Greater Occipital  8. Suprascapular ( infraspinatus)  9. Lateral Antebrachial Cutaneous  10. Sural  11. Lateral Popliteal  12. Superficial Radial  13. Dorsal Scapular  14. Superior Cluneal  15. Posterior Cutaneous L 2  16. Inferior Gluteal  17. Lateral Pectoral  18. Ilitotibal  19. Infraorbital  20. Spinous process T7  21. Posterior cutaneous  T6  22. Posterior cutaneous L 5  23. Supraorbital  24. Common fibular    Paravertebral Points:  L2-5    Symptomatic Points:   R QL    Assessment:     Patient demonstrated appropriate response to FDN. Twitch response noted with needling to the R L2 level. Pt also self reports deep ache with needling to L4-5 paraspinals and QL at adjacent level. Pt self reports resolution of pain and stiffness following IDN session.    Patient Education/Response:     Education provided re:   Purpose, benefits, and potential side effects of dry needling.   Educated pt to use heat following treatment sessions to reduce c/o pain or soreness and to improve circulation to needled sites.   Encouraged pt to continue with HEP to maintain flexibility, ROM, and functional mobility.  Christophe verbalized good understanding of education     Plans and Goals:     Monitor response to FDN. Continue with FDN in POC as tolerated.     Alba MAHER  Elaine, PT  6/8/2018

## 2018-06-29 ENCOUNTER — CLINICAL SUPPORT (OUTPATIENT)
Dept: REHABILITATION | Facility: HOSPITAL | Age: 48
End: 2018-06-29
Attending: INTERNAL MEDICINE
Payer: COMMERCIAL

## 2018-06-29 DIAGNOSIS — M54.50 ACUTE RIGHT-SIDED LOW BACK PAIN WITHOUT SCIATICA: ICD-10-CM

## 2018-06-29 DIAGNOSIS — R29.3 POSTURE IMBALANCE: ICD-10-CM

## 2018-06-29 PROCEDURE — 97110 THERAPEUTIC EXERCISES: CPT | Mod: PN

## 2018-06-29 NOTE — PROGRESS NOTES
Physical Therapy Daily Note     Name: Christophe Knight [place]  Clinic Number: [id number]  Diagnosis:   Encounter Diagnoses   Name Primary?    Acute right-sided low back pain without sciatica     Posture imbalance      Physician: Moris Menchaca MD  Precautions: Type 2 DM, Overweight (BMI 32.1 kg/m2), HTN  Visit #: 4 of 20  PTA Visit #: 0  Time In: 4:00  Time Out: 5:00  Total treatment time: 60 min (1:1 60 min)    Subjective     Pt reports: that he is consistent and compliant with his HEP daily. He says that he has not been able to keep up with his strengthening at his home gym as often as he'd like but he is motivated to continue with his back strengthening program. Pt denies pain or soreness in the back during daily activities. Pt no longer has pain or difficulty with daily activities and is ready to be discharged from therapy.  Pain Scale: Christophe rates pain on a scale of 0-10 to be 0 currently. Pt repors <1 at worst.    Objective        Thoracic/Lumbar AROM: Pain/Dysfunction with Movement:   Flexion WFL, no pain   Extension WFL, no pain   Right side bending WFL, no pain   Left side bending WFL, no pain   Right rotation WFL, no pain   Left rotation WFL, no pain      Lower Extremity Strength: grossly 5/5  Flexibility: grossly fair+  Joint Mobility: WFL     Special Tests:   Test Name  Test Result   Prone Instability Test (--)   Lumbar Quadrant test (--)    Straight Leg Raise (--)    Crossed Straight Leg Raise (--)       Gait: Without AD  Analysis: Assistance x Indep.  Bed Mobility:Independent  Transfers: Independent     Other: FOTO limitation = 6% disability      Christophe received individual therapeutic exercises to develop strength, endurance, ROM, flexibility, posture and core stabilization for 50 minutes including:  Treadmill: 6 minutes (collected history, assessed pt complaints, education on causes of back pain; importance of exercise)  Standing extensions: 10x  "  LTR 20x   DKTC 20x   Figure 4 piriformis stretch: 2 x 30"  +Qped bird dog: 1 x 15 x 3' holds no resistance, 1 x 5 x YTB  +prone trunk ext over EOT: 1 x 15  +dead bugs with ball: 1 x 15  +long leg bridge: 1 x 15  +LLB with leg lift: 1 x 10    Matrix Leg press: 2 x 10 x 160#  Matrix Rows: 2 x 10 x 80#  Trunk ext over PB: 2 x 10  farmers walk: 1 lap (down and back length of gym) x 20# KB - 1 lap for ea UE  Lawn mowers: 2 x 10 x 13#    Christophe received the following manual therapy techniques: none today    Written Home Exercises Provided: see Clinical Reference for updated HEP  Pt demo good understanding of the education provided. Christophe demonstrated good return demonstration of activities.     Education provided re:  Christophe verbalized good understanding of education provided.   No spiritual or educational barriers to learning provided    Assessment     Pt demonstrates strength, flexibility, and ROM necessary for dynamic spine stability with ADLs. Pt is independent with HEP and demonstrates good return technique. Pt instructed to continue with strength and flexibility program at home to maintain functional gains. Pt no longer has pain or difficulty with functional activities and has returned to PLOF. Pt has progressed well and has met 9 of 9 therapeutic goals. Pt no longer requires skilled PT. Recommend D/C from skilled care.    Goals as follows: See 6/29/2018   Short Term Goals (4 Weeks):   1. Pt will report 20% reduction in pain of the lumbar spine and LE for ease with ADL's - Met  2. PT will demonstrate improved trunk strength by a half grade in for ease with upright posture during standing activities. - Met  3. Pt will demonstrate improved lumbar spine ROM in all directions by a half grade for ease with bending activities.  - Met  4. Pt to demonstrate improved functional ability with FOTO limitation <=25% disability. - Met     Long Term Goals (8 Weeks):   1. Pt will report being independent with HEP for maintenance of " improvements gained during therapy sessions - Met  2. PT will report 50% reduction of pain of the back and LE for ease with dressing and grooming activities.  - Met  3. Pt will demonstrate trunk and extremity strength to >=4+/5 without the provocation of pain for ease with household chores - Met  4. Pt will demonstrate appropriate upright posture without external cueing for ease with work related activities.  - Met  5. Pt to demonstrate improved functional ability with FOTO limitation <=20% disability. - Met     Plan     D/C with HEP    Therapist: Alba Henry, PT  6/29/2018

## 2018-08-03 DIAGNOSIS — E11.9 TYPE 2 DIABETES MELLITUS WITHOUT COMPLICATION, UNSPECIFIED WHETHER LONG TERM INSULIN USE: ICD-10-CM

## 2018-08-06 DIAGNOSIS — E78.5 HYPERLIPIDEMIA ASSOCIATED WITH TYPE 2 DIABETES MELLITUS: ICD-10-CM

## 2018-08-06 DIAGNOSIS — E11.69 HYPERLIPIDEMIA ASSOCIATED WITH TYPE 2 DIABETES MELLITUS: ICD-10-CM

## 2018-08-06 DIAGNOSIS — E11.9 TYPE 2 DIABETES MELLITUS WITHOUT COMPLICATION, WITHOUT LONG-TERM CURRENT USE OF INSULIN: ICD-10-CM

## 2018-08-07 ENCOUNTER — OFFICE VISIT (OUTPATIENT)
Dept: FAMILY MEDICINE | Facility: CLINIC | Age: 48
End: 2018-08-07
Payer: COMMERCIAL

## 2018-08-07 ENCOUNTER — LAB VISIT (OUTPATIENT)
Dept: LAB | Facility: HOSPITAL | Age: 48
End: 2018-08-07
Attending: INTERNAL MEDICINE
Payer: COMMERCIAL

## 2018-08-07 ENCOUNTER — CLINICAL SUPPORT (OUTPATIENT)
Dept: OPHTHALMOLOGY | Facility: CLINIC | Age: 48
End: 2018-08-07
Attending: INTERNAL MEDICINE
Payer: COMMERCIAL

## 2018-08-07 VITALS
DIASTOLIC BLOOD PRESSURE: 70 MMHG | OXYGEN SATURATION: 98 % | WEIGHT: 221 LBS | TEMPERATURE: 98 F | SYSTOLIC BLOOD PRESSURE: 120 MMHG | BODY MASS INDEX: 30.94 KG/M2 | HEIGHT: 71 IN | HEART RATE: 91 BPM

## 2018-08-07 DIAGNOSIS — E11.9 TYPE 2 DIABETES MELLITUS WITHOUT COMPLICATION, WITHOUT LONG-TERM CURRENT USE OF INSULIN: Chronic | ICD-10-CM

## 2018-08-07 DIAGNOSIS — E66.9 OBESITY, CLASS I, BMI 30-34.9: ICD-10-CM

## 2018-08-07 DIAGNOSIS — E11.9 TYPE 2 DIABETES MELLITUS WITHOUT COMPLICATION, WITHOUT LONG-TERM CURRENT USE OF INSULIN: Primary | Chronic | ICD-10-CM

## 2018-08-07 DIAGNOSIS — D36.7 CYST, DERMOID, LEG, RIGHT: ICD-10-CM

## 2018-08-07 LAB
ESTIMATED AVG GLUCOSE: 171 MG/DL
HBA1C MFR BLD HPLC: 7.6 %

## 2018-08-07 PROCEDURE — 36415 COLL VENOUS BLD VENIPUNCTURE: CPT | Mod: PO

## 2018-08-07 PROCEDURE — 3078F DIAST BP <80 MM HG: CPT | Mod: CPTII,S$GLB,, | Performed by: INTERNAL MEDICINE

## 2018-08-07 PROCEDURE — 99214 OFFICE O/P EST MOD 30 MIN: CPT | Mod: S$GLB,,, | Performed by: INTERNAL MEDICINE

## 2018-08-07 PROCEDURE — 92250 FUNDUS PHOTOGRAPHY W/I&R: CPT | Mod: S$GLB,,, | Performed by: OPHTHALMOLOGY

## 2018-08-07 PROCEDURE — 99999 PR PBB SHADOW E&M-EST. PATIENT-LVL IV: CPT | Mod: PBBFAC,,, | Performed by: INTERNAL MEDICINE

## 2018-08-07 PROCEDURE — 83036 HEMOGLOBIN GLYCOSYLATED A1C: CPT

## 2018-08-07 PROCEDURE — 3008F BODY MASS INDEX DOCD: CPT | Mod: CPTII,S$GLB,, | Performed by: INTERNAL MEDICINE

## 2018-08-07 PROCEDURE — 3074F SYST BP LT 130 MM HG: CPT | Mod: CPTII,S$GLB,, | Performed by: INTERNAL MEDICINE

## 2018-08-07 PROCEDURE — 99999 PR PBB SHADOW E&M-EST. PATIENT-LVL II: CPT | Mod: PBBFAC,,,

## 2018-08-07 PROCEDURE — 3045F PR MOST RECENT HEMOGLOBIN A1C LEVEL 7.0-9.0%: CPT | Mod: CPTII,S$GLB,, | Performed by: INTERNAL MEDICINE

## 2018-08-07 RX ORDER — ATORVASTATIN CALCIUM 20 MG/1
TABLET, FILM COATED ORAL
Qty: 90 TABLET | Refills: 0 | OUTPATIENT
Start: 2018-08-07

## 2018-08-07 RX ORDER — LINAGLIPTIN 5 MG/1
TABLET, FILM COATED ORAL
Qty: 90 TABLET | Refills: 0 | OUTPATIENT
Start: 2018-08-07

## 2018-08-07 RX ORDER — METFORMIN HYDROCHLORIDE 1000 MG/1
TABLET ORAL
Qty: 180 TABLET | Refills: 0 | OUTPATIENT
Start: 2018-08-07

## 2018-08-07 NOTE — PROGRESS NOTES
HPI     Christophe Carroll here for diabetic eye exam with non-dilated fundus   photos. Completed by Crissy Kent.    Last edited by Zayra Myers on 8/7/2018  2:55 PM. (History)            Assessment /Plan     For exam results, see Encounter Report.    Type 2 diabetes mellitus without complication, without long-term current use of insulin  -     Diabetic Eye Screening Photo      Please see Dr. Gonzales's progress notes for interpretation.

## 2018-08-07 NOTE — PROGRESS NOTES
Assessment & Plan  Problem List Items Addressed This Visit        Endocrine    Type 2 diabetes mellitus without complication, without long-term current use of insulin - Primary (Chronic)    Current Assessment & Plan     Recheck labs today.  Needs eye cam.  Continue Metformin and Tradjenta.             Relevant Orders    Diabetic Eye Screening Photo    Obesity, Class I, BMI 30-34.9 (Chronic)    Current Assessment & Plan     The patient is asked to make an attempt to improve diet and exercise patterns to aid in medical management of this problem. We specifically discussed cutting calorie intake by 500-1000 calories per day for a goal of a 1-2 pound weight loss and recommendations for a mostly plant based diet with limited red meats/refined grains/processed foods/added sugars.            Other Visit Diagnoses     Cyst, dermoid, leg, right   -  Referred to dermatology      Relevant Orders    Ambulatory consult to Dermatology            Health Maintenance reviewed, as above.    Follow-up: Follow-up for 3-6 months for routine physical.    ______________________________________________________________________    Chief Complaint  Chief Complaint   Patient presents with    Diabetes    Follow-up       HPI  Christophe Carroll is a 47 y.o. male with multiple medical diagnoses as listed in the medical history and problem list that presents for DM follow up.  Pt is known to me with his last appointment 5/2/2018.  His last A1c was 8.4% in May.  He has lost 9# since the LOV.      He has been taking and tolerating his medication without concern for side effects.  No polydipsia, -phagia, -uria.  No hypoglycemic symptoms.  He has been eating much healthier.  Cut out [employer] for breakfast.  More salads.  Less candy.        PAST MEDICAL HISTORY:  Past Medical History:   Diagnosis Date    Diabetes mellitus type II     Hypertension        PAST SURGICAL HISTORY:  Past Surgical History:   Procedure Laterality Date    removal of  epididymal cyst         SOCIAL HISTORY:  Social History     Social History    Marital status:      Spouse name: N/A    Number of children: N/A    Years of education: N/A     Occupational History     [employer]     Social History Main Topics    Smoking status: Never Smoker    Smokeless tobacco: Never Used    Alcohol use 0.0 oz/week      Comment: occasional    Drug use: No    Sexual activity: Yes     Partners: Female      Comment:      Other Topics Concern    Not on file     Social History Narrative    No narrative on file       FAMILY HISTORY:  Family History   Problem Relation Age of Onset    Diabetes Mother     Diabetes Maternal Aunt     Kidney disease Maternal Aunt     Diabetes Maternal Grandmother        ALLERGIES AND MEDICATIONS: updated and reviewed.  Review of patient's allergies indicates:   Allergen Reactions    Lisinopril Rash    Penicillins Swelling     Current Outpatient Prescriptions   Medication Sig Dispense Refill    atorvastatin (LIPITOR) 20 MG tablet Take 1 tablet (20 mg total) by mouth once daily. 90 tablet 3    metformin (GLUCOPHAGE) 1000 MG tablet Take 1 tablet (1,000 mg total) by mouth 2 (two) times daily with meals. 180 tablet 3    diclofenac (VOLTAREN) 50 MG EC tablet Take 1 tablet (50 mg total) by mouth 2 (two) times daily. Take with food 60 tablet 1    linagliptin (TRADJENTA) 5 mg Tab tablet Take 1 tablet (5 mg total) by mouth once daily. 90 tablet 3    tadalafil (CIALIS) 20 MG Tab Take 1 tablet (20 mg total) by mouth once daily. 6 tablet 5    TRAVATAN Z 0.004 % Drop   6     No current facility-administered medications for this visit.          ROS  Review of Systems   Constitutional: Negative for chills, fever and unexpected weight change.   HENT: Negative for congestion, dental problem, ear pain, hearing loss, rhinorrhea, sore throat and trouble swallowing.    Eyes: Negative for discharge, redness and visual disturbance.   Respiratory: Negative for  "cough, chest tightness, shortness of breath and wheezing.    Cardiovascular: Negative for chest pain, palpitations and leg swelling.   Gastrointestinal: Negative for abdominal pain, constipation, diarrhea, nausea and vomiting.   Endocrine: Negative for polydipsia, polyphagia and polyuria.   Genitourinary: Negative for decreased urine volume, dysuria and hematuria.   Musculoskeletal: Negative for arthralgias and myalgias.   Skin: Negative for color change and rash.   Neurological: Negative for dizziness, weakness, light-headedness and headaches.   Psychiatric/Behavioral: Negative for decreased concentration, dysphoric mood, sleep disturbance and suicidal ideas.           Physical Exam  Vitals:    08/07/18 1314   BP: 120/70   Pulse: 91   Temp: 98.3 °F (36.8 °C)   SpO2: 98%   Weight: 100.2 kg (221 lb)   Height: 5' 11" (1.803 m)    Body mass index is 30.82 kg/m².  Weight: 100.2 kg (221 lb)   Height: 5' 11" (180.3 cm)   Physical Exam   Constitutional: He is oriented to person, place, and time. He appears well-developed and well-nourished. No distress.   Eyes: Conjunctivae and EOM are normal. Pupils are equal, round, and reactive to light.   Cardiovascular: Normal rate, regular rhythm and intact distal pulses.  Exam reveals no gallop and no friction rub.    No murmur heard.  Pulmonary/Chest: Effort normal and breath sounds normal. No respiratory distress. He has no wheezes. He has no rales.   Musculoskeletal: Normal range of motion. He exhibits no edema or tenderness.   Neurological: He is alert and oriented to person, place, and time.   Symmetric facial movements palate elevated symmetrically tongue midline    Skin: Skin is warm and dry. No rash noted. No erythema.     Protective Sensation (w/ 10 gram monofilament):  Right: Intact  Left: Intact    Visual Inspection:  Normal -  Bilateral    Pedal Pulses:   Right: Present  Left: Present    Posterior tibialis:   Right:Present  Left: Present        Health Maintenance       " Date Due Completion Date    TETANUS VACCINE 09/28/1989 ---    Foot Exam 06/30/2018 6/30/2017    Eye Exam 07/10/2018 7/10/2017    Influenza Vaccine 08/01/2018 ---    Hemoglobin A1c 11/04/2018 5/4/2018    Low Dose Statin 05/02/2019 5/2/2018    Lipid Panel 05/04/2019 5/4/2018    Urine Microalbumin 05/04/2019 5/4/2018    Pneumococcal PPSV23 (Medium Risk) (2) 09/28/2036 6/30/2017

## 2018-08-07 NOTE — PATIENT INSTRUCTIONS
Below is a list of free Apps that you may find helpful (some of them may not apply to you since this is a general list of helpful Apps):    Android & Apple users:  EatFit - Created by [facility] nutritionists.  Tons of great recipes, links to >100 restaurants in town with healthy choices, and shopping guides.    Fooducate - Helps with healthy diet and weight loss  Shop Well - Scan barcodes to foods to see if it is healthy or not.  It will also suggest healthier alternatives  Lose It - Calorie tracking and goal setting for weight loss.  Peer support is also available  Calorie Counter and Diet Tracker by MyFitnessPal - Helps count calories for food intake and calories burned during exercise  PopsuMac[employer]k Active - has pictures and videos of preloaded workout routines  [facility] Diabetes Pal - log for home sugars, diet, weight, and blood pressure  Headspace - Guides your through meditation.  The first 10 programs are free.

## 2018-08-07 NOTE — Clinical Note
HPI    Christophe Carroll here for diabetic eye exam with non-dilated fundus  photos. Completed by Crissy Kent.   Last edited by Zayra Myers on 8/7/2018  2:55 PM. (History)

## 2018-08-08 ENCOUNTER — TELEPHONE (OUTPATIENT)
Dept: OPTOMETRY | Facility: CLINIC | Age: 48
End: 2018-08-08

## 2018-08-08 ENCOUNTER — PATIENT MESSAGE (OUTPATIENT)
Dept: FAMILY MEDICINE | Facility: CLINIC | Age: 48
End: 2018-08-08

## 2018-08-08 DIAGNOSIS — Z00.00 ROUTINE MEDICAL EXAM: Primary | ICD-10-CM

## 2018-08-08 DIAGNOSIS — E11.9 TYPE 2 DIABETES MELLITUS WITHOUT COMPLICATION, WITHOUT LONG-TERM CURRENT USE OF INSULIN: Chronic | ICD-10-CM

## 2018-08-08 NOTE — TELEPHONE ENCOUNTER
----- Message from Laura Schwarz sent at 8/8/2018  8:26 AM CDT -----      ----- Message -----  From: Antolin Gonzales MD  Sent: 8/7/2018   4:47 PM  To: Avinash Oden

## 2018-08-13 ENCOUNTER — PATIENT MESSAGE (OUTPATIENT)
Dept: FAMILY MEDICINE | Facility: CLINIC | Age: 48
End: 2018-08-13

## 2018-08-24 DIAGNOSIS — E11.9 TYPE 2 DIABETES MELLITUS WITHOUT COMPLICATION, WITHOUT LONG-TERM CURRENT USE OF INSULIN: ICD-10-CM

## 2018-08-24 DIAGNOSIS — E11.69 HYPERLIPIDEMIA ASSOCIATED WITH TYPE 2 DIABETES MELLITUS: ICD-10-CM

## 2018-08-24 DIAGNOSIS — E78.5 HYPERLIPIDEMIA ASSOCIATED WITH TYPE 2 DIABETES MELLITUS: ICD-10-CM

## 2018-08-26 RX ORDER — LINAGLIPTIN 5 MG/1
TABLET, FILM COATED ORAL
Qty: 90 TABLET | Refills: 0 | Status: SHIPPED | OUTPATIENT
Start: 2018-08-26 | End: 2018-11-08 | Stop reason: SDUPTHER

## 2018-08-26 RX ORDER — METFORMIN HYDROCHLORIDE 1000 MG/1
TABLET ORAL
Qty: 180 TABLET | Refills: 0 | Status: SHIPPED | OUTPATIENT
Start: 2018-08-26 | End: 2018-11-08

## 2018-08-26 RX ORDER — ATORVASTATIN CALCIUM 20 MG/1
TABLET, FILM COATED ORAL
Qty: 90 TABLET | Refills: 0 | Status: SHIPPED | OUTPATIENT
Start: 2018-08-26 | End: 2018-11-08

## 2018-11-05 ENCOUNTER — LAB VISIT (OUTPATIENT)
Dept: LAB | Facility: HOSPITAL | Age: 48
End: 2018-11-05
Attending: INTERNAL MEDICINE
Payer: COMMERCIAL

## 2018-11-05 DIAGNOSIS — Z00.00 ROUTINE MEDICAL EXAM: ICD-10-CM

## 2018-11-05 LAB
ALBUMIN SERPL BCP-MCNC: 4.3 G/DL
ALP SERPL-CCNC: 91 U/L
ALT SERPL W/O P-5'-P-CCNC: 27 U/L
ANION GAP SERPL CALC-SCNC: 8 MMOL/L
AST SERPL-CCNC: 22 U/L
BASOPHILS # BLD AUTO: 0.04 K/UL
BASOPHILS NFR BLD: 0.5 %
BILIRUB SERPL-MCNC: 0.3 MG/DL
BUN SERPL-MCNC: 12 MG/DL
CALCIUM SERPL-MCNC: 9.8 MG/DL
CHLORIDE SERPL-SCNC: 104 MMOL/L
CHOLEST SERPL-MCNC: 110 MG/DL
CHOLEST/HDLC SERPL: 3.5 {RATIO}
CO2 SERPL-SCNC: 26 MMOL/L
CREAT SERPL-MCNC: 1.1 MG/DL
DIFFERENTIAL METHOD: ABNORMAL
EOSINOPHIL # BLD AUTO: 0.1 K/UL
EOSINOPHIL NFR BLD: 1.6 %
ERYTHROCYTE [DISTWIDTH] IN BLOOD BY AUTOMATED COUNT: 13.2 %
EST. GFR  (AFRICAN AMERICAN): >60 ML/MIN/1.73 M^2
EST. GFR  (NON AFRICAN AMERICAN): >60 ML/MIN/1.73 M^2
ESTIMATED AVG GLUCOSE: 192 MG/DL
GLUCOSE SERPL-MCNC: 158 MG/DL
HBA1C MFR BLD HPLC: 8.3 %
HCT VFR BLD AUTO: 45.7 %
HDLC SERPL-MCNC: 31 MG/DL
HDLC SERPL: 28.2 %
HGB BLD-MCNC: 14.5 G/DL
IMM GRANULOCYTES # BLD AUTO: 0.02 K/UL
IMM GRANULOCYTES NFR BLD AUTO: 0.2 %
LDLC SERPL CALC-MCNC: 37.6 MG/DL
LYMPHOCYTES # BLD AUTO: 3.6 K/UL
LYMPHOCYTES NFR BLD: 42.7 %
MCH RBC QN AUTO: 27.8 PG
MCHC RBC AUTO-ENTMCNC: 31.7 G/DL
MCV RBC AUTO: 88 FL
MONOCYTES # BLD AUTO: 0.7 K/UL
MONOCYTES NFR BLD: 8.9 %
NEUTROPHILS # BLD AUTO: 3.8 K/UL
NEUTROPHILS NFR BLD: 46.1 %
NONHDLC SERPL-MCNC: 79 MG/DL
NRBC BLD-RTO: 0 /100 WBC
PLATELET # BLD AUTO: 290 K/UL
PMV BLD AUTO: 10.3 FL
POTASSIUM SERPL-SCNC: 4.8 MMOL/L
PROT SERPL-MCNC: 7.8 G/DL
RBC # BLD AUTO: 5.21 M/UL
SODIUM SERPL-SCNC: 138 MMOL/L
TRIGL SERPL-MCNC: 207 MG/DL
WBC # BLD AUTO: 8.31 K/UL

## 2018-11-05 PROCEDURE — 80061 LIPID PANEL: CPT

## 2018-11-05 PROCEDURE — 80053 COMPREHEN METABOLIC PANEL: CPT

## 2018-11-05 PROCEDURE — 36415 COLL VENOUS BLD VENIPUNCTURE: CPT | Mod: PO

## 2018-11-05 PROCEDURE — 83036 HEMOGLOBIN GLYCOSYLATED A1C: CPT

## 2018-11-05 PROCEDURE — 85025 COMPLETE CBC W/AUTO DIFF WBC: CPT

## 2018-11-08 ENCOUNTER — TELEPHONE (OUTPATIENT)
Dept: FAMILY MEDICINE | Facility: CLINIC | Age: 48
End: 2018-11-08

## 2018-11-08 ENCOUNTER — OFFICE VISIT (OUTPATIENT)
Dept: FAMILY MEDICINE | Facility: CLINIC | Age: 48
End: 2018-11-08
Payer: COMMERCIAL

## 2018-11-08 VITALS
SYSTOLIC BLOOD PRESSURE: 130 MMHG | WEIGHT: 224 LBS | BODY MASS INDEX: 31.36 KG/M2 | DIASTOLIC BLOOD PRESSURE: 70 MMHG | OXYGEN SATURATION: 96 % | TEMPERATURE: 98 F | HEART RATE: 101 BPM | HEIGHT: 71 IN

## 2018-11-08 DIAGNOSIS — E78.5 HYPERLIPIDEMIA ASSOCIATED WITH TYPE 2 DIABETES MELLITUS: Chronic | ICD-10-CM

## 2018-11-08 DIAGNOSIS — E11.9 TYPE 2 DIABETES MELLITUS WITHOUT COMPLICATION, WITHOUT LONG-TERM CURRENT USE OF INSULIN: Primary | Chronic | ICD-10-CM

## 2018-11-08 DIAGNOSIS — Z23 FLU VACCINE NEED: ICD-10-CM

## 2018-11-08 DIAGNOSIS — E11.69 HYPERLIPIDEMIA ASSOCIATED WITH TYPE 2 DIABETES MELLITUS: Chronic | ICD-10-CM

## 2018-11-08 PROCEDURE — 90686 IIV4 VACC NO PRSV 0.5 ML IM: CPT | Mod: S$GLB,,, | Performed by: INTERNAL MEDICINE

## 2018-11-08 PROCEDURE — 99214 OFFICE O/P EST MOD 30 MIN: CPT | Mod: 25,S$GLB,, | Performed by: INTERNAL MEDICINE

## 2018-11-08 PROCEDURE — 3075F SYST BP GE 130 - 139MM HG: CPT | Mod: CPTII,S$GLB,, | Performed by: INTERNAL MEDICINE

## 2018-11-08 PROCEDURE — 3078F DIAST BP <80 MM HG: CPT | Mod: CPTII,S$GLB,, | Performed by: INTERNAL MEDICINE

## 2018-11-08 PROCEDURE — 3008F BODY MASS INDEX DOCD: CPT | Mod: CPTII,S$GLB,, | Performed by: INTERNAL MEDICINE

## 2018-11-08 PROCEDURE — 90471 IMMUNIZATION ADMIN: CPT | Mod: S$GLB,,, | Performed by: INTERNAL MEDICINE

## 2018-11-08 PROCEDURE — 99999 PR PBB SHADOW E&M-EST. PATIENT-LVL III: CPT | Mod: PBBFAC,,, | Performed by: INTERNAL MEDICINE

## 2018-11-08 PROCEDURE — 3045F PR MOST RECENT HEMOGLOBIN A1C LEVEL 7.0-9.0%: CPT | Mod: CPTII,S$GLB,, | Performed by: INTERNAL MEDICINE

## 2018-11-08 RX ORDER — ATORVASTATIN CALCIUM 10 MG/1
10 TABLET, FILM COATED ORAL DAILY
Qty: 90 TABLET | Refills: 0 | Status: SHIPPED | OUTPATIENT
Start: 2018-11-08 | End: 2019-03-11 | Stop reason: SDUPTHER

## 2018-11-08 RX ORDER — METFORMIN HYDROCHLORIDE 500 MG/1
1000 TABLET, EXTENDED RELEASE ORAL
Qty: 180 TABLET | Refills: 0 | Status: SHIPPED | OUTPATIENT
Start: 2018-11-08 | End: 2019-02-11

## 2018-11-08 NOTE — PROGRESS NOTES
Assessment & Plan  Problem List Items Addressed This Visit        Cardiac/Vascular    Hyperlipidemia associated with type 2 diabetes mellitus (Chronic)    Current Assessment & Plan     Over controlled on current dose.  Will reduce to 10mg daily.           Relevant Medications    linagliptin (TRADJENTA) 5 mg Tab tablet    metFORMIN (GLUCOPHAGE-XR) 500 MG 24 hr tablet    atorvastatin (LIPITOR) 10 MG tablet    Other Relevant Orders    Lipid panel       Endocrine    Type 2 diabetes mellitus without complication, without long-term current use of insulin - Primary (Chronic)    Current Assessment & Plan     Doing well with diet.  Keep tradjenta the same.  Change metformin to XR.  3 month followup         Relevant Medications    linagliptin (TRADJENTA) 5 mg Tab tablet    metFORMIN (GLUCOPHAGE-XR) 500 MG 24 hr tablet    Other Relevant Orders    Hemoglobin A1c    Comprehensive metabolic panel      Other Visit Diagnoses     Flu vaccine need    -  vaccinate    Relevant Orders    Influenza - Quadrivalent (3 years & older) (PF)            Health Maintenance reviewed, as above.    Follow-up: Follow-up in about 3 months (around 2/8/2019) for Routine Physical.    ______________________________________________________________________    Chief Complaint  Chief Complaint   Patient presents with    Hyperlipidemia    Diabetes    Follow-up       HPI  Christophe Carroll is a 48 y.o. male with multiple medical diagnoses as listed in the medical history and problem list that presents for DM HLD follow up.  Pt is known to me with his last appointment 8/7/2018.  He had labs prior to this OV that showed reassuring CBC, CMP.  His LDL has decreased to 38 and his HDL has dropped also.  His A1c has unfortunately increased back up to 8.3%.      He has not been taking his medications consistently.  He had concerns with taking his medications if he doesn't take the meds with foods.  Will be rushing to get to work etc and not take a dose.  If he  takes metformin after lunch and then again at dinner he gets diarrhea.  No hypoglycemic symptoms, N/V/D.        PAST MEDICAL HISTORY:  Past Medical History:   Diagnosis Date    Diabetes mellitus type II     Hypertension        PAST SURGICAL HISTORY:  Past Surgical History:   Procedure Laterality Date    removal of epididymal cyst         SOCIAL HISTORY:  Social History     Socioeconomic History    Marital status:      Spouse name: Not on file    Number of children: Not on file    Years of education: Not on file    Highest education level: Not on file   Social Needs    Financial resource strain: Not on file    Food insecurity - worry: Not on file    Food insecurity - inability: Not on file    Transportation needs - medical: Not on file    Transportation needs - non-medical: Not on file   Occupational History     Employer: [employer]   Tobacco Use    Smoking status: Never Smoker    Smokeless tobacco: Never Used   Substance and Sexual Activity    Alcohol use: Yes     Alcohol/week: 0.0 oz     Comment: occasional    Drug use: No    Sexual activity: Yes     Partners: Female     Comment:    Other Topics Concern    Not on file   Social History Narrative    Not on file       FAMILY HISTORY:  Family History   Problem Relation Age of Onset    Diabetes Mother     Diabetes Maternal Aunt     Kidney disease Maternal Aunt     Diabetes Maternal Grandmother        ALLERGIES AND MEDICATIONS: updated and reviewed.  Review of patient's allergies indicates:   Allergen Reactions    Lisinopril Rash    Penicillins Swelling     Current Outpatient Medications   Medication Sig Dispense Refill    atorvastatin (LIPITOR) 10 MG tablet Take 1 tablet (10 mg total) by mouth once daily. 90 tablet 0    linagliptin (TRADJENTA) 5 mg Tab tablet TAKE 1 TABLET(5 MG) BY MOUTH EVERY DAY 90 tablet 0    TRAVATAN Z 0.004 % Drop   6    diclofenac (VOLTAREN) 50 MG EC tablet Take 1 tablet (50 mg total) by mouth 2  "(two) times daily. Take with food 60 tablet 1    metFORMIN (GLUCOPHAGE-XR) 500 MG 24 hr tablet Take 2 tablets (1,000 mg total) by mouth daily with breakfast. 180 tablet 0    tadalafil (CIALIS) 20 MG Tab Take 1 tablet (20 mg total) by mouth once daily. 6 tablet 5     No current facility-administered medications for this visit.          ROS  Review of Systems   Constitutional: Negative for chills, fever and unexpected weight change.   HENT: Negative for congestion, dental problem, ear pain, hearing loss, rhinorrhea, sore throat and trouble swallowing.    Eyes: Negative for discharge, redness and visual disturbance.   Respiratory: Negative for cough, chest tightness, shortness of breath and wheezing.    Cardiovascular: Negative for chest pain, palpitations and leg swelling.   Gastrointestinal: Negative for abdominal pain, constipation, diarrhea, nausea and vomiting.   Endocrine: Negative for polydipsia, polyphagia and polyuria.   Genitourinary: Negative for decreased urine volume, dysuria and hematuria.   Musculoskeletal: Negative for arthralgias and myalgias.   Skin: Negative for color change and rash.   Neurological: Negative for dizziness, weakness, light-headedness and headaches.   Psychiatric/Behavioral: Negative for decreased concentration, dysphoric mood, sleep disturbance and suicidal ideas.           Physical Exam  Vitals:    11/08/18 0952   BP: 130/70   Pulse: 101   Temp: 98.3 °F (36.8 °C)   SpO2: 96%   Weight: 101.6 kg (224 lb)   Height: 5' 11" (1.803 m)    Body mass index is 31.24 kg/m².  Weight: 101.6 kg (224 lb)   Height: 5' 11" (180.3 cm)   Physical Exam   Constitutional: He is oriented to person, place, and time. He appears well-developed and well-nourished. No distress.   HENT:   Head: Normocephalic and atraumatic.   Eyes: Conjunctivae, EOM and lids are normal. Pupils are equal, round, and reactive to light. No scleral icterus.   Neck: Full passive range of motion without pain. Neck supple. No JVD " present. Carotid bruit is not present. No thyromegaly present.   Cardiovascular: Normal rate, regular rhythm, normal heart sounds and intact distal pulses. Exam reveals no S3, no S4 and no friction rub.   No murmur heard.  Pulmonary/Chest: Effort normal and breath sounds normal. He has no wheezes. He has no rhonchi. He has no rales.   Abdominal: Soft. Bowel sounds are normal. There is no tenderness.   Musculoskeletal: He exhibits no edema or tenderness.   Lymphadenopathy:        Head (right side): No submental and no submandibular adenopathy present.        Head (left side): No submental and no submandibular adenopathy present.     He has no cervical adenopathy.        Right: No supraclavicular adenopathy present.        Left: No supraclavicular adenopathy present.   Neurological: He is alert and oriented to person, place, and time.   Motor grossly intact.  Sensory grossly intact.  Symmetric facial movements palate elevated symmetrically tongue midline   Skin: Skin is warm and dry. No rash noted.   Psychiatric: He has a normal mood and affect. His speech is normal and behavior is normal. Thought content normal.         Health Maintenance       Date Due Completion Date    TETANUS VACCINE 09/28/1989 ---    Influenza Vaccine 08/01/2018 ---    Hemoglobin A1c 05/05/2019 11/5/2018    Foot Exam 08/07/2019 8/7/2018    Eye Exam 08/07/2019 8/7/2018    Override on 8/7/2018: Done    Low Dose Statin 08/26/2019 8/26/2018    Lipid Panel 11/05/2019 11/5/2018    Urine Microalbumin 11/05/2019 11/5/2018    Pneumococcal PPSV23 (Medium Risk) (2) 09/28/2036 6/30/2017

## 2018-11-08 NOTE — PROGRESS NOTES
Patient tolerated injection well, immunization record provided.  Instructed to remain in lobby for 15 minutes and to report any adverse reactions right away.  Verbalized understanding.

## 2018-11-08 NOTE — TELEPHONE ENCOUNTER
Patient will be contacting Dr. Crissy Mercedes office for scheduling. Referral submitted for authorization.

## 2018-12-27 ENCOUNTER — PATIENT MESSAGE (OUTPATIENT)
Dept: FAMILY MEDICINE | Facility: CLINIC | Age: 48
End: 2018-12-27

## 2019-01-02 ENCOUNTER — OFFICE VISIT (OUTPATIENT)
Dept: FAMILY MEDICINE | Facility: CLINIC | Age: 49
End: 2019-01-02
Payer: COMMERCIAL

## 2019-01-02 VITALS
WEIGHT: 224 LBS | HEIGHT: 71 IN | BODY MASS INDEX: 31.36 KG/M2 | OXYGEN SATURATION: 98 % | SYSTOLIC BLOOD PRESSURE: 108 MMHG | TEMPERATURE: 98 F | DIASTOLIC BLOOD PRESSURE: 76 MMHG | HEART RATE: 77 BPM

## 2019-01-02 DIAGNOSIS — E66.9 OBESITY, CLASS I, BMI 30-34.9: Chronic | ICD-10-CM

## 2019-01-02 DIAGNOSIS — M54.12 CERVICAL RADICULOPATHY: Primary | ICD-10-CM

## 2019-01-02 DIAGNOSIS — E11.69 HYPERLIPIDEMIA ASSOCIATED WITH TYPE 2 DIABETES MELLITUS: Chronic | ICD-10-CM

## 2019-01-02 DIAGNOSIS — E78.5 HYPERLIPIDEMIA ASSOCIATED WITH TYPE 2 DIABETES MELLITUS: Chronic | ICD-10-CM

## 2019-01-02 DIAGNOSIS — E11.9 TYPE 2 DIABETES MELLITUS WITHOUT COMPLICATION, WITHOUT LONG-TERM CURRENT USE OF INSULIN: Chronic | ICD-10-CM

## 2019-01-02 PROCEDURE — 3078F DIAST BP <80 MM HG: CPT | Mod: CPTII,S$GLB,, | Performed by: NURSE PRACTITIONER

## 2019-01-02 PROCEDURE — 3045F PR MOST RECENT HEMOGLOBIN A1C LEVEL 7.0-9.0%: CPT | Mod: CPTII,S$GLB,, | Performed by: NURSE PRACTITIONER

## 2019-01-02 PROCEDURE — 3008F PR BODY MASS INDEX (BMI) DOCUMENTED: ICD-10-PCS | Mod: CPTII,S$GLB,, | Performed by: NURSE PRACTITIONER

## 2019-01-02 PROCEDURE — 3008F BODY MASS INDEX DOCD: CPT | Mod: CPTII,S$GLB,, | Performed by: NURSE PRACTITIONER

## 2019-01-02 PROCEDURE — 99999 PR PBB SHADOW E&M-EST. PATIENT-LVL IV: CPT | Mod: PBBFAC,,, | Performed by: NURSE PRACTITIONER

## 2019-01-02 PROCEDURE — 99999 PR PBB SHADOW E&M-EST. PATIENT-LVL IV: ICD-10-PCS | Mod: PBBFAC,,, | Performed by: NURSE PRACTITIONER

## 2019-01-02 PROCEDURE — 3045F PR MOST RECENT HEMOGLOBIN A1C LEVEL 7.0-9.0%: ICD-10-PCS | Mod: CPTII,S$GLB,, | Performed by: NURSE PRACTITIONER

## 2019-01-02 PROCEDURE — 3078F PR MOST RECENT DIASTOLIC BLOOD PRESSURE < 80 MM HG: ICD-10-PCS | Mod: CPTII,S$GLB,, | Performed by: NURSE PRACTITIONER

## 2019-01-02 PROCEDURE — 3074F SYST BP LT 130 MM HG: CPT | Mod: CPTII,S$GLB,, | Performed by: NURSE PRACTITIONER

## 2019-01-02 PROCEDURE — 99214 OFFICE O/P EST MOD 30 MIN: CPT | Mod: S$GLB,,, | Performed by: NURSE PRACTITIONER

## 2019-01-02 PROCEDURE — 99214 PR OFFICE/OUTPT VISIT, EST, LEVL IV, 30-39 MIN: ICD-10-PCS | Mod: S$GLB,,, | Performed by: NURSE PRACTITIONER

## 2019-01-02 PROCEDURE — 3074F PR MOST RECENT SYSTOLIC BLOOD PRESSURE < 130 MM HG: ICD-10-PCS | Mod: CPTII,S$GLB,, | Performed by: NURSE PRACTITIONER

## 2019-01-02 RX ORDER — PREDNISONE 20 MG/1
TABLET ORAL
Qty: 7 TABLET | Refills: 0 | Status: SHIPPED | OUTPATIENT
Start: 2019-01-02 | End: 2019-02-11

## 2019-01-02 RX ORDER — GABAPENTIN 100 MG/1
100 CAPSULE ORAL 3 TIMES DAILY
Qty: 30 CAPSULE | Refills: 2 | Status: SHIPPED | OUTPATIENT
Start: 2019-01-02 | End: 2019-03-20

## 2019-01-02 NOTE — PATIENT INSTRUCTIONS
Understanding Cervical Radiculopathy    Cervical radiculopathy is irritation or inflammation of a nerve root in the neck. It causes neck pain and other symptoms that may spread into the chest or down the arm. To understand this condition, it helps to understand the parts of the spine:  · Vertebrae. These are bones that stack to form the spine. The cervical spine contains the 7 vertebrae in the neck.  · Disks. These are soft pads of tissue between the vertebrae. They act as shock absorbers for the spine.  · The spinal canal. This is a tunnel formed within the stacked vertebrae. The spinal cord runs through this canal.  · Nerves. These branch off the spinal cord. As they leave the spinal canal, nerves pass through openings between the vertebrae. The nerve root is the part of the nerve that is closest to the spinal cord.   With cervical radiculopathy, nerve roots in the neck become irritated. This leads to pain and symptoms that can travel to the nerves that go from the spinal cord down the arms and into the torso.  What causes cervical radiculopathy?  Aging, injury, poor posture, and other issues can lead to problems in the neck. These problems may then irritate nerve roots. These include:  · Damage to a disk in the cervical spine. The damaged disk may then press on nearby nerve roots.  · Degeneration from wear and tear, and aging. This can lead to narrowing (stenosis) of the openings between the vertebrae. The narrowed openings press on nerve roots as they leave the spinal canal.  · An unstable spine. This is when a vertebra slips forward. It can then press on a nerve root.  There are other, less common causes of pressure on nerves in the neck. These include infection, cysts, and tumors.  Symptoms of cervical radiculopathy  These include:  · Neck pain  · Pain, numbness, tingling, or weakness that travels down the arm  · Loss of neck movement  · Muscle spasms  Treatment for cervical radiculopathy  In most cases,  your healthcare provider will first try treatments that help relieve symptoms. These may include:  · Prescription or over-the-counter pain medicines. These help relieve pain and swelling.  · Cold packs. These help reduce pain.  · Resting. This involves avoiding positions and activities that increase pain.  · Neck brace (cervical collar). This can help relieve inflammation and pain.  · Physical therapy, including exercises and stretches. This can help decrease pain and increase movement and function.  · Shots of medicinesaround the nerve roots. This is done to help relieve symptoms for a time.  In some cases, your healthcare provider may advise surgery to fix the underlying problem. This depends on the cause, the symptoms, and how long the pain has lasted.  Possible complications  Over time, an irritated and inflamed nerve may become damaged. This may lead to long-lasting (permanent) numbness or weakness. If symptoms change suddenly or get worse, be sure to let your healthcare provider know.     When to call your healthcare provider  Call your healthcare provider right away if you have any of these:  · New pain or pain that gets worse  · New or increasing weakness, numbness, or tingling in your arm or hand  · Bowel or bladder changes   Date Last Reviewed: 3/10/2016  © 1164-9908 [employer]. [address] [place] [address], [place] [address]. All rights reserved. This information is not intended as a substitute for professional medical care. Always follow your healthcare professional's instructions.

## 2019-01-02 NOTE — PROGRESS NOTES
History of Present Illness   Christophe Carroll is a 48 y.o. man with medical history as listed below who presents today for evaluation of neck pain. The pain is located on the left side and radiates down the left arm to the elbow. There is associated numbness. The pain is worse with holding arms and neck in certain positions. He denies injury or trauma. Reports being diagnosed with cervical radiculopathy about four years prior after MRI. He denies new injury or trauma. He has not tried OTC medication for symptoms. He has no additional complaints and is otherwise healthy on today's visit.    Past Medical History:   Diagnosis Date    Diabetes mellitus type II     Hypertension        Past Surgical History:   Procedure Laterality Date    removal of epididymal cyst         Social History     Socioeconomic History    Marital status:      Spouse name: None    Number of children: None    Years of education: None    Highest education level: None   Social Needs    Financial resource strain: None    Food insecurity - worry: None    Food insecurity - inability: None    Transportation needs - medical: None    Transportation needs - non-medical: None   Occupational History     Employer: [employer]   Tobacco Use    Smoking status: Never Smoker    Smokeless tobacco: Never Used   Substance and Sexual Activity    Alcohol use: Yes     Alcohol/week: 0.0 oz     Comment: occasional    Drug use: No    Sexual activity: Yes     Partners: Female     Comment:    Other Topics Concern    None   Social History Narrative    None       Family History   Problem Relation Age of Onset    Diabetes Mother     Diabetes Maternal Aunt     Kidney disease Maternal Aunt     Diabetes Maternal Grandmother        Review of Systems  Review of Systems   Constitutional: Negative for chills and fever.   Cardiovascular: Negative for chest pain and palpitations.   Musculoskeletal: Positive for neck pain. Negative for  "back pain and falls.   Neurological: Positive for tingling. Negative for focal weakness and headaches.     A complete review of systems was otherwise negative.    Physical Exam  /76 (BP Location: Right arm, Patient Position: Sitting, BP Method: Medium (Manual))   Pulse 77   Temp 97.9 °F (36.6 °C) (Oral)   Ht 5' 11" (1.803 m)   Wt 101.6 kg (224 lb)   SpO2 98%   BMI 31.24 kg/m²   General appearance: alert, appears stated age, cooperative and no distress  Neck: supple, symmetrical, trachea midline and midline TTP at C5-C6 with positive Spurling's   Back: symmetric, no curvature. ROM normal. No CVA tenderness.  Lungs: clear to auscultation bilaterally  Heart: regular rate and rhythm, S1, S2 normal, no murmur, click, rub or gallop  Extremities: extremities normal, atraumatic, no cyanosis or edema  Pulses: 2+ and symmetric  Skin: Skin color, texture, turgor normal. No rashes or lesions  Neurologic: Grossly normal    Assessment/Plan  Cervical radiculopathy  Short course of prednisone, monitor CBG closely.  Gabapentin at bedtime, okay to increase to TID as needed.   Resume routine exercise. If no improvement, referral to physical therapy.  Handout provided on home management.  -     gabapentin (NEURONTIN) 100 MG capsule; Take 1 capsule (100 mg total) by mouth 3 (three) times daily. for 10 days  Dispense: 30 capsule; Refill: 2  -     predniSONE (DELTASONE) 20 MG tablet; Take 3 tab day 1, take 2 tab day 2, take 1 tab day 3, take 0.5 tab day 4 and 5  Dispense: 7 tablet; Refill: 0    Type 2 diabetes mellitus without complication, without long-term current use of insulin  The current medical regimen is effective;  continue present plan and medications.    Hyperlipidemia associated with type 2 diabetes mellitus  The current medical regimen is effective;  continue present plan and medications.    Obesity, Class I, BMI 30-34.9  The patient is asked to make an attempt to improve diet and exercise patterns to aid in " medical management of this problem.    Patient has verbalized understanding and is in agreement with plan of care.    Follow-up if symptoms worsen or fail to improve.

## 2019-01-25 DIAGNOSIS — M54.12 CERVICAL RADICULOPATHY: ICD-10-CM

## 2019-01-28 ENCOUNTER — PATIENT OUTREACH (OUTPATIENT)
Dept: ADMINISTRATIVE | Facility: HOSPITAL | Age: 49
End: 2019-01-28

## 2019-01-28 DIAGNOSIS — E11.9 TYPE 2 DIABETES MELLITUS WITHOUT COMPLICATION, WITHOUT LONG-TERM CURRENT USE OF INSULIN: ICD-10-CM

## 2019-01-28 RX ORDER — PREDNISONE 20 MG/1
TABLET ORAL
Qty: 7 TABLET | Refills: 0 | OUTPATIENT
Start: 2019-01-28

## 2019-01-29 RX ORDER — LINAGLIPTIN 5 MG/1
TABLET, FILM COATED ORAL
Qty: 90 TABLET | Refills: 0 | Status: SHIPPED | OUTPATIENT
Start: 2019-01-29 | End: 2019-05-13

## 2019-02-07 ENCOUNTER — LAB VISIT (OUTPATIENT)
Dept: LAB | Facility: HOSPITAL | Age: 49
End: 2019-02-07
Attending: INTERNAL MEDICINE
Payer: COMMERCIAL

## 2019-02-07 DIAGNOSIS — E11.9 TYPE 2 DIABETES MELLITUS WITHOUT COMPLICATION, WITHOUT LONG-TERM CURRENT USE OF INSULIN: Chronic | ICD-10-CM

## 2019-02-07 DIAGNOSIS — E78.5 HYPERLIPIDEMIA ASSOCIATED WITH TYPE 2 DIABETES MELLITUS: Chronic | ICD-10-CM

## 2019-02-07 DIAGNOSIS — E11.69 HYPERLIPIDEMIA ASSOCIATED WITH TYPE 2 DIABETES MELLITUS: Chronic | ICD-10-CM

## 2019-02-07 LAB
ALBUMIN SERPL BCP-MCNC: 4.6 G/DL
ALP SERPL-CCNC: 95 U/L
ALT SERPL W/O P-5'-P-CCNC: 29 U/L
ANION GAP SERPL CALC-SCNC: 8 MMOL/L
AST SERPL-CCNC: 22 U/L
BILIRUB SERPL-MCNC: 0.5 MG/DL
BUN SERPL-MCNC: 10 MG/DL
CALCIUM SERPL-MCNC: 10.5 MG/DL
CHLORIDE SERPL-SCNC: 104 MMOL/L
CHOLEST SERPL-MCNC: 132 MG/DL
CHOLEST/HDLC SERPL: 3.7 {RATIO}
CO2 SERPL-SCNC: 26 MMOL/L
CREAT SERPL-MCNC: 1.1 MG/DL
EST. GFR  (AFRICAN AMERICAN): >60 ML/MIN/1.73 M^2
EST. GFR  (NON AFRICAN AMERICAN): >60 ML/MIN/1.73 M^2
ESTIMATED AVG GLUCOSE: 223 MG/DL
GLUCOSE SERPL-MCNC: 196 MG/DL
HBA1C MFR BLD HPLC: 9.4 %
HDLC SERPL-MCNC: 36 MG/DL
HDLC SERPL: 27.3 %
LDLC SERPL CALC-MCNC: 61.6 MG/DL
NONHDLC SERPL-MCNC: 96 MG/DL
POTASSIUM SERPL-SCNC: 4.6 MMOL/L
PROT SERPL-MCNC: 8 G/DL
SODIUM SERPL-SCNC: 138 MMOL/L
TRIGL SERPL-MCNC: 172 MG/DL

## 2019-02-07 PROCEDURE — 83036 HEMOGLOBIN GLYCOSYLATED A1C: CPT

## 2019-02-07 PROCEDURE — 80053 COMPREHEN METABOLIC PANEL: CPT

## 2019-02-07 PROCEDURE — 36415 COLL VENOUS BLD VENIPUNCTURE: CPT | Mod: PO

## 2019-02-07 PROCEDURE — 80061 LIPID PANEL: CPT

## 2019-02-11 ENCOUNTER — PATIENT MESSAGE (OUTPATIENT)
Dept: ADMINISTRATIVE | Facility: OTHER | Age: 49
End: 2019-02-11

## 2019-02-11 ENCOUNTER — OFFICE VISIT (OUTPATIENT)
Dept: FAMILY MEDICINE | Facility: CLINIC | Age: 49
End: 2019-02-11
Payer: COMMERCIAL

## 2019-02-11 VITALS
HEART RATE: 107 BPM | SYSTOLIC BLOOD PRESSURE: 130 MMHG | OXYGEN SATURATION: 97 % | HEIGHT: 71 IN | WEIGHT: 225.06 LBS | DIASTOLIC BLOOD PRESSURE: 60 MMHG | BODY MASS INDEX: 31.51 KG/M2 | TEMPERATURE: 97 F

## 2019-02-11 DIAGNOSIS — E78.5 HYPERLIPIDEMIA ASSOCIATED WITH TYPE 2 DIABETES MELLITUS: Chronic | ICD-10-CM

## 2019-02-11 DIAGNOSIS — E11.9 TYPE 2 DIABETES MELLITUS: ICD-10-CM

## 2019-02-11 DIAGNOSIS — E11.9 TYPE 2 DIABETES MELLITUS WITHOUT COMPLICATION, WITHOUT LONG-TERM CURRENT USE OF INSULIN: Primary | Chronic | ICD-10-CM

## 2019-02-11 DIAGNOSIS — E11.69 HYPERLIPIDEMIA ASSOCIATED WITH TYPE 2 DIABETES MELLITUS: Chronic | ICD-10-CM

## 2019-02-11 PROCEDURE — 3046F PR MOST RECENT HEMOGLOBIN A1C LEVEL > 9.0%: ICD-10-PCS | Mod: CPTII,S$GLB,, | Performed by: INTERNAL MEDICINE

## 2019-02-11 PROCEDURE — 3078F PR MOST RECENT DIASTOLIC BLOOD PRESSURE < 80 MM HG: ICD-10-PCS | Mod: CPTII,S$GLB,, | Performed by: INTERNAL MEDICINE

## 2019-02-11 PROCEDURE — 3075F PR MOST RECENT SYSTOLIC BLOOD PRESS GE 130-139MM HG: ICD-10-PCS | Mod: CPTII,S$GLB,, | Performed by: INTERNAL MEDICINE

## 2019-02-11 PROCEDURE — 3078F DIAST BP <80 MM HG: CPT | Mod: CPTII,S$GLB,, | Performed by: INTERNAL MEDICINE

## 2019-02-11 PROCEDURE — 3008F PR BODY MASS INDEX (BMI) DOCUMENTED: ICD-10-PCS | Mod: CPTII,S$GLB,, | Performed by: INTERNAL MEDICINE

## 2019-02-11 PROCEDURE — 3008F BODY MASS INDEX DOCD: CPT | Mod: CPTII,S$GLB,, | Performed by: INTERNAL MEDICINE

## 2019-02-11 PROCEDURE — 99999 PR PBB SHADOW E&M-EST. PATIENT-LVL III: CPT | Mod: PBBFAC,,, | Performed by: INTERNAL MEDICINE

## 2019-02-11 PROCEDURE — 99214 PR OFFICE/OUTPT VISIT, EST, LEVL IV, 30-39 MIN: ICD-10-PCS | Mod: S$GLB,,, | Performed by: INTERNAL MEDICINE

## 2019-02-11 PROCEDURE — 3046F HEMOGLOBIN A1C LEVEL >9.0%: CPT | Mod: CPTII,S$GLB,, | Performed by: INTERNAL MEDICINE

## 2019-02-11 PROCEDURE — 99214 OFFICE O/P EST MOD 30 MIN: CPT | Mod: S$GLB,,, | Performed by: INTERNAL MEDICINE

## 2019-02-11 PROCEDURE — 3075F SYST BP GE 130 - 139MM HG: CPT | Mod: CPTII,S$GLB,, | Performed by: INTERNAL MEDICINE

## 2019-02-11 PROCEDURE — 99999 PR PBB SHADOW E&M-EST. PATIENT-LVL III: ICD-10-PCS | Mod: PBBFAC,,, | Performed by: INTERNAL MEDICINE

## 2019-02-11 RX ORDER — METFORMIN HYDROCHLORIDE 500 MG/1
1000 TABLET, EXTENDED RELEASE ORAL 2 TIMES DAILY WITH MEALS
Qty: 360 TABLET | Refills: 0 | Status: SHIPPED | OUTPATIENT
Start: 2019-02-11 | End: 2019-08-13 | Stop reason: SDUPTHER

## 2019-02-11 NOTE — PATIENT INSTRUCTIONS
Below is a list of free Apps that you may find helpful (some of them may not apply to you since this is a general list of helpful Apps):    Android & Apple users:  EatFit - Created by [facility] nutritionists.  Tons of great recipes, links to >100 restaurants in town with healthy choices, and shopping guides.    Fooducate - Helps with healthy diet and weight loss  Shop Well - Scan barcodes to foods to see if it is healthy or not.  It will also suggest healthier alternatives  Lose It - Calorie tracking and goal setting for weight loss.  Peer support is also available  Calorie Counter and Diet Tracker by MyFitnessPal - Helps count calories for food intake and calories burned during exercise  PopsuPolyplex Active - has pictures and videos of preloaded workout routines  [facility] Diabetes Pal - log for home sugars, diet, weight, and blood pressure  Headspace - Guides your through meditation.  The first 10 programs are free.

## 2019-02-11 NOTE — PROGRESS NOTES
Assessment & Plan  Problem List Items Addressed This Visit        Cardiac/Vascular    Hyperlipidemia associated with type 2 diabetes mellitus (Chronic)    Current Assessment & Plan     The current medical regimen is effective;  continue present plan and medications.          Relevant Medications    metFORMIN (GLUCOPHAGE-XR) 500 MG 24 hr tablet       Endocrine    Type 2 diabetes mellitus without complication, without long-term current use of insulin - Primary (Chronic)    Current Assessment & Plan     Increase metformin XR to 100mg BID.  Keep the DPP4 the same.  Recheck labs in 3 months.          Relevant Medications    metFORMIN (GLUCOPHAGE-XR) 500 MG 24 hr tablet    Other Relevant Orders    Hemoglobin A1c    Basic metabolic panel    Diabetes Digital Medicine (DDMP) Enrollment Order (Completed)    Diabetes Digital Medicine (DDMP): Assign Onboarding Questionnaires (Completed)            Health Maintenance reviewed, as above.    Follow-up: Follow-up in about 3 months (around 5/11/2019) for follow up for chronic conditions.    ______________________________________________________________________    Chief Complaint  Chief Complaint   Patient presents with    Diabetes    Hyperlipidemia    Follow-up       HPI  Christophe Carroll is a 48 y.o. male with multiple medical diagnoses as listed in the medical history and problem list that presents for DM HLD follow up.  Pt is known to me with his last appointment 1/2/2019.  Of note the patient is being worked in since he was 20 min late for his appointment.     He had labs prior to this OV that showed stable CMP, controlled lipids.  A1c has increased to 9.4%.      He has been taking and tolerating his medications as prescribed.  No perceived side effects.  Diet has been sub-optimal by the patient's report.  His cervical radiculopathy flare has resolved.       PAST MEDICAL HISTORY:  Past Medical History:   Diagnosis Date    Diabetes mellitus type II     Hypertension         PAST SURGICAL HISTORY:  Past Surgical History:   Procedure Laterality Date    removal of epididymal cyst         SOCIAL HISTORY:  Social History     Socioeconomic History    Marital status:      Spouse name: Not on file    Number of children: Not on file    Years of education: Not on file    Highest education level: Not on file   Social Needs    Financial resource strain: Not on file    Food insecurity - worry: Not on file    Food insecurity - inability: Not on file    Transportation needs - medical: Not on file    Transportation needs - non-medical: Not on file   Occupational History     Employer: [employer]   Tobacco Use    Smoking status: Never Smoker    Smokeless tobacco: Never Used   Substance and Sexual Activity    Alcohol use: Yes     Alcohol/week: 0.0 oz     Comment: occasional    Drug use: No    Sexual activity: Yes     Partners: Female     Comment:    Other Topics Concern    Not on file   Social History Narrative    Not on file       FAMILY HISTORY:  Family History   Problem Relation Age of Onset    Diabetes Mother     Diabetes Maternal Aunt     Kidney disease Maternal Aunt     Diabetes Maternal Grandmother        ALLERGIES AND MEDICATIONS: updated and reviewed.  Review of patient's allergies indicates:   Allergen Reactions    Lisinopril Rash    Penicillins Swelling     Current Outpatient Medications   Medication Sig Dispense Refill    atorvastatin (LIPITOR) 10 MG tablet Take 1 tablet (10 mg total) by mouth once daily. 90 tablet 0    metFORMIN (GLUCOPHAGE-XR) 500 MG 24 hr tablet Take 2 tablets (1,000 mg total) by mouth 2 (two) times daily with meals. 360 tablet 0    TRADJENTA 5 mg Tab tablet TAKE 1 TABLET(5 MG) BY MOUTH EVERY DAY 90 tablet 0    TRAVATAN Z 0.004 % Drop   6    gabapentin (NEURONTIN) 100 MG capsule Take 1 capsule (100 mg total) by mouth 3 (three) times daily. for 10 days 30 capsule 2    tadalafil (CIALIS) 20 MG Tab Take 1 tablet (20 mg  "total) by mouth once daily. 6 tablet 5     No current facility-administered medications for this visit.          ROS  Review of Systems   Constitutional: Negative for chills, fever and unexpected weight change.   HENT: Negative for congestion, dental problem, ear pain, hearing loss, rhinorrhea, sore throat and trouble swallowing.    Eyes: Negative for discharge, redness and visual disturbance.   Respiratory: Negative for cough, chest tightness, shortness of breath and wheezing.    Cardiovascular: Negative for chest pain, palpitations and leg swelling.   Gastrointestinal: Negative for abdominal pain, constipation, diarrhea, nausea and vomiting.   Endocrine: Negative for polydipsia, polyphagia and polyuria.   Genitourinary: Negative for decreased urine volume, dysuria and hematuria.   Musculoskeletal: Negative for arthralgias and myalgias.   Skin: Negative for color change and rash.   Neurological: Negative for dizziness, weakness, light-headedness and headaches.   Psychiatric/Behavioral: Negative for decreased concentration, dysphoric mood, sleep disturbance and suicidal ideas.           Physical Exam  Vitals:    02/11/19 1507   BP: 130/60   Pulse: 107   Temp: 97.3 °F (36.3 °C)   SpO2: 97%   Weight: 102.1 kg (225 lb 1.4 oz)   Height: 5' 11" (1.803 m)    Body mass index is 31.39 kg/m².  Weight: 102.1 kg (225 lb 1.4 oz)   Height: 5' 11" (180.3 cm)   Physical Exam   Constitutional: He is oriented to person, place, and time. He appears well-developed and well-nourished. No distress.   HENT:   Head: Normocephalic and atraumatic.   Eyes: Conjunctivae, EOM and lids are normal. Pupils are equal, round, and reactive to light. No scleral icterus.   Neck: Full passive range of motion without pain. Neck supple. No JVD present. Carotid bruit is not present. No thyromegaly present.   Cardiovascular: Normal rate, regular rhythm, normal heart sounds and intact distal pulses. Exam reveals no S3, no S4 and no friction rub.   No murmur " heard.  Pulmonary/Chest: Effort normal and breath sounds normal. He has no wheezes. He has no rhonchi. He has no rales.   Abdominal: Soft. Bowel sounds are normal. There is no tenderness.   Musculoskeletal: He exhibits no edema or tenderness.   Lymphadenopathy:        Head (right side): No submental and no submandibular adenopathy present.        Head (left side): No submental and no submandibular adenopathy present.     He has no cervical adenopathy.        Right: No supraclavicular adenopathy present.        Left: No supraclavicular adenopathy present.   Neurological: He is alert and oriented to person, place, and time.   Motor grossly intact.  Sensory grossly intact.  Symmetric facial movements palate elevated symmetrically tongue midline   Skin: Skin is warm and dry. No rash noted.   Psychiatric: He has a normal mood and affect. His speech is normal and behavior is normal. Thought content normal.         Health Maintenance       Date Due Completion Date    TETANUS VACCINE 09/28/1989 ---    Foot Exam 08/07/2019 8/7/2018    Hemoglobin A1c 08/07/2019 2/7/2019    Eye Exam 08/07/2019 8/7/2018    Override on 8/7/2018: Done    Urine Microalbumin 11/05/2019 11/5/2018    Low Dose Statin 01/02/2020 1/2/2019    Lipid Panel 02/07/2020 2/7/2019

## 2019-02-14 ENCOUNTER — PATIENT MESSAGE (OUTPATIENT)
Dept: ADMINISTRATIVE | Facility: OTHER | Age: 49
End: 2019-02-14

## 2019-02-14 ENCOUNTER — PATIENT OUTREACH (OUTPATIENT)
Dept: OTHER | Facility: OTHER | Age: 49
End: 2019-02-14

## 2019-02-14 NOTE — PROGRESS NOTES
Digital Medicine Enrollment Call    Introduced Mr. Christophe Carroll to Digital Medicine.     Discussed program expectations and requirements.    Introduced digital medicine care team.     Reviewed the importance of self-monitoring for digital medicine participation.     Reviewed that the Digital Medicine team is not available for emergencies and instructed the patient to call 911 or [facility] ([phone] or [phone]) if one arises.                Last 6 Patient Entered Readings                                          Most Recent A1c: 9.4% on 2/7/2019  (Goal: 7%)     Recent Readings 2/12/2019 2/11/2019    Blood Glucose (mg/dL) 221 177

## 2019-02-14 NOTE — LETTER
February 14, 2019     Christophe Carroll  [address]  [place] [address]       Dear Christophe,    Welcome to [facility]! Our goal is to make care effective, proactive and convenient by using data you send us from home to better treat your chronic conditions.          My name is Michelle Miller, and I am your dedicated Digital Medicine clinician. As an expert in medication management, I will help ensure that the medications you are taking continue to provide the intended benefits and help you reach your goals. You can reach me directly at  or by sending me a message directly through your [facility] account.        I am Angelique Shah and I will be your health . My job is to help you identify lifestyle changes to improve your disease control. We will talk about nutrition, exercise, and other ways you may be able to adjust your current habits to better your health. Additionally, we will help ensure you are completing the tests and screenings that are necessary to help manage your conditions. You can reach me directly at  or by sending me a message directly through your [facility] account.    Most importantly, YOU are at the center of this team. Together, we will work to improve your overall health and encourage you to meet your goals for a healthier lifestyle.     What we expect from YOU:  · Please take frequent home blood sugar measurements according to the frequency your physician and Digital Medicine care team specify. It is important that your team see both fasting and after meal readings.      Be available to receive phone calls or [facility] messages, when appropriate, from your care team. Please let us know if there are any specific days or times that work best for us to reach you via phone.     Complete routine tests and screenings. Dont worry, we will help keep you on track!           What you should expect from your Digital Medicine Care Team:   We will work with you to create  a personalized plan of care and provide you with encouragement and education, including regarding lifestyle changes, that could help you manage your disease states.     We will adjust your current medications, if needed, and continue to monitor your long-term progress.     We will provide you and your physician with monthly progress reports after you have been in the program for more than 30 days.     We will send you reminders through [facility] and text messages to help ensure you do not miss any testing deadlines to help manage your disease states.    You will be able to reach us by phone or through your [facility] account by clicking our names under Care Team on the right side of the home screen.    I look forward to working with you to achieve your blood pressure goals!    We look forward to working with you to help manage your health,    Sincerely,    Your Digital Medicine Team    Please visit our websites to learn more:   · Diabetes: [email or web address]      Remember, we are not available for emergencies. If you have an emergency, please contact your doctors office directly or call [facility] on-call ([phone] or [phone]) or 911.    Diabetes: We want help you get important tests and screenings done regularly to assure that your health needs are met. We have put a new system in place, called CareTouch that will help us improve how we monitor and reach out to you about the following lab tests that you will need to help manage your diabetes.  · Hemoglobin A1c testing (Frequency: Every 3 to 6 months, dependent on A1c goal)  · Nephropathy Assessment, generally urine micro albumin testing (Frequency: Yearly)  · Eye exam through a quick 30-minute Eye Photo Exam (Frequency: 1-2 Years, depending on result)    When necessary you can come in to one of the labs on the attached page any weekday between 10:30 am and 4:00 pm to have your tests done prior to their due date. Tell the   agent you received a CareTouch letter, or just look for the CareTouch sign.

## 2019-02-15 ENCOUNTER — PATIENT MESSAGE (OUTPATIENT)
Dept: FAMILY MEDICINE | Facility: CLINIC | Age: 49
End: 2019-02-15

## 2019-02-25 ENCOUNTER — PATIENT OUTREACH (OUTPATIENT)
Dept: OTHER | Facility: OTHER | Age: 49
End: 2019-02-25

## 2019-02-25 ENCOUNTER — PATIENT MESSAGE (OUTPATIENT)
Dept: ADMINISTRATIVE | Facility: OTHER | Age: 49
End: 2019-02-25

## 2019-02-25 NOTE — PROGRESS NOTES
Last 6 Patient Entered Readings                                          Most Recent A1c: 9.4% on 2/7/2019  (Goal: 7%)     Recent Readings 2/23/2019 2/22/2019 2/20/2019 2/19/2019 2/18/2019    Blood Glucose (mg/dL) 164 199 182 157 244        Digital Medicine: Health  Introduction    Introduced Mr. Christophe Carroll to Digital Medicine. Discussed health  role and recommended lifestyle modifications.    Patient reports A1C was off because of a miscommunicationwith medication dose. Reports he knows what he needs to be doing but never on track with all components at the same time.     Lifestyle Assessment:  Current Dietary Habits(i.e. low sodium, food labels, dining out): Reports he is using the [employer] madeleine. Patient reports he is just starting to get back on track. Patient is going to send me a screen shot of [employer] madeleine meals so we can review in detail about nutrition labels.    Breakfast: Pre- packed smoothie blend. Adds water. 24oz serving size is 12oz. Or a bowl of great grains (pecans).     Weekend: pork sausage (3-4 links), eggs, or grits and pancakes or 2 biscuits.    Lunch: Pre-made salad form [employer] (southwest, ranch cob or caesar).    Dinner: Hardest part- varies (beans with pork chops, spaghetti, etc). Will  fast food throughout the week.     Exercise: Recently has not been exercising. Reports he has been visiting his mom at hospital a lot lately. Reports in the past he was doing workout videos (beach body, body beast, etc). Reports he was working out 7x/wk for 30 minutes. Reports he would do the different videos to keep doing something different because he didn't want to plateau.     Alcohol/Tobacco: Deferred.     Medication Adherence: has been compliant with the medicaiton regimen. Reports he is doing a lot better with taking medication. Reports he missed a dose on the weekend due to waking up to late.       Reviewed AHA/AACE recommendations:  Recommended CHO intake, 45-65% of daily  caloric intake  Perform 150 minutes of physical activity per week    Reviewed the importance of self-monitoring, medication adherence, and that the health  can be used as a resource for lifestyle modifications to help reduce or maintain a healthy lifestyle.  Reviewed that the Digital Medicine team is not available for emergencies and instructed the patient to call 911 or [facility] ([phone] or [phone]) if one arises.

## 2019-03-01 ENCOUNTER — PATIENT MESSAGE (OUTPATIENT)
Dept: OTHER | Facility: OTHER | Age: 49
End: 2019-03-01

## 2019-03-08 ENCOUNTER — PATIENT OUTREACH (OUTPATIENT)
Dept: OTHER | Facility: OTHER | Age: 49
End: 2019-03-08

## 2019-03-08 NOTE — PROGRESS NOTES
Last 6 Patient Entered Readings                                          Most Recent A1c: 9.4% on 2/7/2019  (Goal: 7%)     Recent Readings 2/27/2019 2/26/2019 2/23/2019 2/22/2019 2/20/2019    Blood Glucose (mg/dL) 152 176 164 199 182          Digital Medicine: Health  Follow Up    Patient reports he has been busy with his mom this week. Patient reports his mom will be released for the hospital on the 20th. I enecouraged patient to take more BS readings.     Lifestyle Modifications:    1.Dietary Modifications (Sodium intake <2,000mg/day, food labels, dining out): Reviewed [employer] madeleine pictures on carbohydrates over the past 3 days from February 25-27. Patient was overall staying in the range for carbohydrate intake. Encouraged patient to be more consistent with carbohydrates varying day to day. Patient reports he would like to send me a couple days from the [employer] madeleine every 2-3 weeks.     2.Physical Activity: Reports exercising has be placed on the back burner.     3.Medication Therapy: Patient has been compliant with the medication regimen.     4.Patient has the following medication side effects/concerns: None.       Follow up with Mr. Christophe Carroll completed. No further questions or concerns. Will continue to follow up to achieve health goals.

## 2019-03-11 DIAGNOSIS — E11.69 HYPERLIPIDEMIA ASSOCIATED WITH TYPE 2 DIABETES MELLITUS: Chronic | ICD-10-CM

## 2019-03-11 DIAGNOSIS — E78.5 HYPERLIPIDEMIA ASSOCIATED WITH TYPE 2 DIABETES MELLITUS: Chronic | ICD-10-CM

## 2019-03-12 RX ORDER — ATORVASTATIN CALCIUM 10 MG/1
TABLET, FILM COATED ORAL
Qty: 90 TABLET | Refills: 0 | Status: SHIPPED | OUTPATIENT
Start: 2019-03-12 | End: 2019-08-13 | Stop reason: SDUPTHER

## 2019-03-20 ENCOUNTER — PATIENT OUTREACH (OUTPATIENT)
Dept: OTHER | Facility: OTHER | Age: 49
End: 2019-03-20

## 2019-03-20 DIAGNOSIS — E11.9 TYPE 2 DIABETES MELLITUS WITHOUT COMPLICATION, WITHOUT LONG-TERM CURRENT USE OF INSULIN: Primary | Chronic | ICD-10-CM

## 2019-03-20 NOTE — PROGRESS NOTES
Last 6 Patient Entered Readings                                          Most Recent A1c: 9.4% on 2/7/2019  (Goal: 7%)     Recent Readings 3/20/2019 3/14/2019 3/13/2019 3/13/2019 3/12/2019    Blood Glucose (mg/dL) 154 162 158 165 182        Diabetes Medications     metFORMIN (GLUCOPHAGE-XR) 500 MG 24 hr tablet Take 2 tablets (1,000 mg total) by mouth 2 (two) times daily with meals.    TRADJENTA 5 mg Tab tablet TAKE 1 TABLET(5 MG) BY MOUTH EVERY DAY     Called patient for digital medicine clinical pharmacist introduction. He is doing well with no complaints. He says he has never seen FBG readings < 150.     1) A1C of 9.4% exceeds goal of <7%. Continue current medications. Reviewed last CMP  2) Continue testing FBG first thing in the morning and occasionally 2 hours after meals  3) Discussed s/s/management of hypoglycemia  4) Patient knows to contact me with any non-urgent clinical changes or concerns. Go to ED or call [facility] for emergencies.   5) Will defer lifestyle counseling to digital medicine health   6) Will continue to follow up. Next A1c and BMP due May 2019    Michelle Miller, Pharm.D.  [facility] Digital Medicine Clinical Pharmacist  [phone]

## 2019-04-05 ENCOUNTER — PATIENT MESSAGE (OUTPATIENT)
Dept: ADMINISTRATIVE | Facility: OTHER | Age: 49
End: 2019-04-05

## 2019-04-15 ENCOUNTER — PATIENT OUTREACH (OUTPATIENT)
Dept: OTHER | Facility: OTHER | Age: 49
End: 2019-04-15

## 2019-04-15 NOTE — PROGRESS NOTES
Last 6 Patient Entered Readings                                          Most Recent A1c: 9.4% on 2/7/2019  (Goal: 7%)     Recent Readings 4/2/2019 3/30/2019 3/29/2019 3/28/2019 3/20/2019    Blood Glucose (mg/dL) 199 87 107 119 154          Patient reports he was about to walk into court. Patient reports this was not a good time for a f/u.

## 2019-04-29 ENCOUNTER — PATIENT OUTREACH (OUTPATIENT)
Dept: ADMINISTRATIVE | Facility: HOSPITAL | Age: 49
End: 2019-04-29

## 2019-05-10 ENCOUNTER — LAB VISIT (OUTPATIENT)
Dept: LAB | Facility: HOSPITAL | Age: 49
End: 2019-05-10
Attending: INTERNAL MEDICINE
Payer: COMMERCIAL

## 2019-05-10 DIAGNOSIS — E11.9 TYPE 2 DIABETES MELLITUS WITHOUT COMPLICATION, WITHOUT LONG-TERM CURRENT USE OF INSULIN: Chronic | ICD-10-CM

## 2019-05-10 LAB
ANION GAP SERPL CALC-SCNC: 9 MMOL/L (ref 8–16)
BUN SERPL-MCNC: 10 MG/DL (ref 6–20)
CALCIUM SERPL-MCNC: 9.6 MG/DL (ref 8.7–10.5)
CHLORIDE SERPL-SCNC: 103 MMOL/L (ref 95–110)
CO2 SERPL-SCNC: 26 MMOL/L (ref 23–29)
CREAT SERPL-MCNC: 1.1 MG/DL (ref 0.5–1.4)
EST. GFR  (AFRICAN AMERICAN): >60 ML/MIN/1.73 M^2
EST. GFR  (NON AFRICAN AMERICAN): >60 ML/MIN/1.73 M^2
ESTIMATED AVG GLUCOSE: 217 MG/DL (ref 68–131)
GLUCOSE SERPL-MCNC: 182 MG/DL (ref 70–110)
HBA1C MFR BLD HPLC: 9.2 % (ref 4–5.6)
POTASSIUM SERPL-SCNC: 4.4 MMOL/L (ref 3.5–5.1)
SODIUM SERPL-SCNC: 138 MMOL/L (ref 136–145)

## 2019-05-10 PROCEDURE — 80048 BASIC METABOLIC PNL TOTAL CA: CPT

## 2019-05-10 PROCEDURE — 36415 COLL VENOUS BLD VENIPUNCTURE: CPT | Mod: PO

## 2019-05-10 PROCEDURE — 83036 HEMOGLOBIN GLYCOSYLATED A1C: CPT

## 2019-05-13 ENCOUNTER — OFFICE VISIT (OUTPATIENT)
Dept: FAMILY MEDICINE | Facility: CLINIC | Age: 49
End: 2019-05-13
Payer: COMMERCIAL

## 2019-05-13 VITALS
TEMPERATURE: 98 F | HEART RATE: 83 BPM | HEIGHT: 71 IN | WEIGHT: 225.31 LBS | BODY MASS INDEX: 31.54 KG/M2 | SYSTOLIC BLOOD PRESSURE: 128 MMHG | OXYGEN SATURATION: 97 % | DIASTOLIC BLOOD PRESSURE: 76 MMHG

## 2019-05-13 DIAGNOSIS — R07.89 ATYPICAL CHEST PAIN: ICD-10-CM

## 2019-05-13 DIAGNOSIS — E11.9 TYPE 2 DIABETES MELLITUS WITHOUT COMPLICATION, WITHOUT LONG-TERM CURRENT USE OF INSULIN: Primary | Chronic | ICD-10-CM

## 2019-05-13 DIAGNOSIS — E11.69 HYPERLIPIDEMIA ASSOCIATED WITH TYPE 2 DIABETES MELLITUS: Chronic | ICD-10-CM

## 2019-05-13 DIAGNOSIS — E78.5 HYPERLIPIDEMIA ASSOCIATED WITH TYPE 2 DIABETES MELLITUS: Chronic | ICD-10-CM

## 2019-05-13 DIAGNOSIS — Z23 NEED FOR TDAP VACCINATION: ICD-10-CM

## 2019-05-13 PROCEDURE — 3008F BODY MASS INDEX DOCD: CPT | Mod: CPTII,S$GLB,, | Performed by: INTERNAL MEDICINE

## 2019-05-13 PROCEDURE — 3078F PR MOST RECENT DIASTOLIC BLOOD PRESSURE < 80 MM HG: ICD-10-PCS | Mod: CPTII,S$GLB,, | Performed by: INTERNAL MEDICINE

## 2019-05-13 PROCEDURE — 3046F HEMOGLOBIN A1C LEVEL >9.0%: CPT | Mod: CPTII,S$GLB,, | Performed by: INTERNAL MEDICINE

## 2019-05-13 PROCEDURE — 3046F PR MOST RECENT HEMOGLOBIN A1C LEVEL > 9.0%: ICD-10-PCS | Mod: CPTII,S$GLB,, | Performed by: INTERNAL MEDICINE

## 2019-05-13 PROCEDURE — 3078F DIAST BP <80 MM HG: CPT | Mod: CPTII,S$GLB,, | Performed by: INTERNAL MEDICINE

## 2019-05-13 PROCEDURE — 3074F SYST BP LT 130 MM HG: CPT | Mod: CPTII,S$GLB,, | Performed by: INTERNAL MEDICINE

## 2019-05-13 PROCEDURE — 93010 ELECTROCARDIOGRAM REPORT: CPT | Mod: S$GLB,,, | Performed by: INTERNAL MEDICINE

## 2019-05-13 PROCEDURE — 93010 EKG 12-LEAD: ICD-10-PCS | Mod: S$GLB,,, | Performed by: INTERNAL MEDICINE

## 2019-05-13 PROCEDURE — 3008F PR BODY MASS INDEX (BMI) DOCUMENTED: ICD-10-PCS | Mod: CPTII,S$GLB,, | Performed by: INTERNAL MEDICINE

## 2019-05-13 PROCEDURE — 93005 EKG 12-LEAD: ICD-10-PCS | Mod: S$GLB,,, | Performed by: INTERNAL MEDICINE

## 2019-05-13 PROCEDURE — 93005 ELECTROCARDIOGRAM TRACING: CPT | Mod: S$GLB,,, | Performed by: INTERNAL MEDICINE

## 2019-05-13 PROCEDURE — 3074F PR MOST RECENT SYSTOLIC BLOOD PRESSURE < 130 MM HG: ICD-10-PCS | Mod: CPTII,S$GLB,, | Performed by: INTERNAL MEDICINE

## 2019-05-13 PROCEDURE — 99214 OFFICE O/P EST MOD 30 MIN: CPT | Mod: S$GLB,,, | Performed by: INTERNAL MEDICINE

## 2019-05-13 PROCEDURE — 99999 PR PBB SHADOW E&M-EST. PATIENT-LVL III: ICD-10-PCS | Mod: PBBFAC,,, | Performed by: INTERNAL MEDICINE

## 2019-05-13 PROCEDURE — 2024F 7 FLD RTA PHOTO EVC RTNOPTHY: CPT | Mod: S$GLB,,, | Performed by: INTERNAL MEDICINE

## 2019-05-13 PROCEDURE — 99999 PR PBB SHADOW E&M-EST. PATIENT-LVL III: CPT | Mod: PBBFAC,,, | Performed by: INTERNAL MEDICINE

## 2019-05-13 PROCEDURE — 99214 PR OFFICE/OUTPT VISIT, EST, LEVL IV, 30-39 MIN: ICD-10-PCS | Mod: S$GLB,,, | Performed by: INTERNAL MEDICINE

## 2019-05-13 PROCEDURE — 2024F PR 7 FIELD PHOTOS WITH INTERP/ REVIEW: ICD-10-PCS | Mod: S$GLB,,, | Performed by: INTERNAL MEDICINE

## 2019-05-13 RX ORDER — FAMOTIDINE 40 MG/1
40 TABLET, FILM COATED ORAL NIGHTLY PRN
Qty: 90 TABLET | Refills: 0 | Status: SHIPPED | OUTPATIENT
Start: 2019-05-13 | End: 2020-02-17

## 2019-05-13 NOTE — PATIENT INSTRUCTIONS
You can do one of the following for the shoulder as needed:    You may take over the counter ibuprofen 400mg (2 tabs) to 800mg (4 tabs) up to every 6 hours as needed for pain/inflammation OR naproxen 220mg (1 tab) to 440mg (2 tabs) every 12 hours as needed for pain/inflammation.  If you are taking the higher dosages, take with some food.  Also, avoid taking the higher dosage of ibuprofren every 6 or naproxen every 12 hours for more than 48-72 hours.  If you start to have abdominal pain, dark stools, or vomiting after doses, stop taking this medication immediately and contact a physician.  Do not take any other NSAIDs (non-steroidal anti-inflammatory drugs) in addition to your ibuprofen.  If you are unsure if a medication is an NSAID you may ask your pharmacist or call the office.

## 2019-05-13 NOTE — PROGRESS NOTES
Assessment & Plan  Problem List Items Addressed This Visit        Cardiac/Vascular    Hyperlipidemia associated with type 2 diabetes mellitus (Chronic)    Current Assessment & Plan     Recheck labs in 3 months         Relevant Medications    dulaglutide (TRULICITY) 0.75 mg/0.5 mL PnIj       Endocrine    Type 2 diabetes mellitus without complication, without long-term current use of insulin - Primary (Chronic)    Current Assessment & Plan     Continue metformin.  Stop to DPP-4 and start GLP-1.  Recheck labs in 3 months.          Relevant Medications    dulaglutide (TRULICITY) 0.75 mg/0.5 mL PnIj    Other Relevant Orders    Comprehensive metabolic panel    Hemoglobin A1c    Microalbumin/creatinine urine ratio    Lipid panel    CBC auto differential      Other Visit Diagnoses     Atypical chest pain    -  EKG per my read shows NSR without AV block, prolonged QTc, Q wave, TWI or ST changes.   Start Pepcid QHS PRN.      Relevant Medications    famotidine (PEPCID) 40 MG tablet    Other Relevant Orders    EKG 12-lead    Need for Tdap vaccination    -  vaccinate    Relevant Orders    (In Office Administered) Tdap Vaccine            Health Maintenance reviewed, as above.    Follow-up: Follow up in about 3 months (around 8/13/2019) for Routine Physical.    ______________________________________________________________________    Chief Complaint  Chief Complaint   Patient presents with    Diabetes    Chest Pain     patient states usually at night,states indigestion.       HPI  Christophe Carroll is a 48 y.o. male with multiple medical diagnoses as listed in the medical history and problem list that presents for f/u uncontrolled diabetes.  Pt is known to me with his last appointment 2/11/2019.  He had labs prior to this OV that showed stable BMP and A1c of 9.2%.      Taking and tolerating medications as prescribed without perceived side effects.  No CP, SOB, palpitations, hypoglycemic symptoms.  He has been off of his diet  after caring for his mother (lung ca with recent mets to brain).      Also c/o left sided chest pain at night.  Radiates to the left posterior shoulder.  No exertional symptoms.  No palpitations, SOB, diaphoresis, lightheadedness, vision change.  He will belch and the pain seems to get better.  Has also tried pepto bismol with ok relief.     Shoulder sometimes hurst with turning his head.  No burning numb pain down the arm.       PAST MEDICAL HISTORY:  Past Medical History:   Diagnosis Date    Diabetes mellitus type II     Hypertension        PAST SURGICAL HISTORY:  Past Surgical History:   Procedure Laterality Date    removal of epididymal cyst         SOCIAL HISTORY:  Social History     Socioeconomic History    Marital status:      Spouse name: Not on file    Number of children: Not on file    Years of education: Not on file    Highest education level: Not on file   Occupational History     Employer: [employer]   Social Needs    Financial resource strain: Not on file    Food insecurity:     Worry: Not on file     Inability: Not on file    Transportation needs:     Medical: Not on file     Non-medical: Not on file   Tobacco Use    Smoking status: Never Smoker    Smokeless tobacco: Never Used   Substance and Sexual Activity    Alcohol use: Yes     Alcohol/week: 0.0 oz     Comment: occasional    Drug use: No    Sexual activity: Yes     Partners: Female     Comment:    Lifestyle    Physical activity:     Days per week: Not on file     Minutes per session: Not on file    Stress: Not on file   Relationships    Social connections:     Talks on phone: Not on file     Gets together: Not on file     Attends Mandaeism service: Not on file     Active member of club or organization: Not on file     Attends meetings of clubs or organizations: Not on file     Relationship status: Not on file   Other Topics Concern    Not on file   Social History Narrative    Not on file       FAMILY  HISTORY:  Family History   Problem Relation Age of Onset    Diabetes Mother     Diabetes Maternal Aunt     Kidney disease Maternal Aunt     Diabetes Maternal Grandmother        ALLERGIES AND MEDICATIONS: updated and reviewed.  Review of patient's allergies indicates:   Allergen Reactions    Lisinopril Rash    Penicillins Swelling     Current Outpatient Medications   Medication Sig Dispense Refill    atorvastatin (LIPITOR) 10 MG tablet TAKE 1 TABLET(10 MG) BY MOUTH EVERY DAY 90 tablet 0    metFORMIN (GLUCOPHAGE-XR) 500 MG 24 hr tablet Take 2 tablets (1,000 mg total) by mouth 2 (two) times daily with meals. 360 tablet 0    TRAVATAN Z 0.004 % Drop   6    dulaglutide (TRULICITY) 0.75 mg/0.5 mL PnIj Inject 0.5 mLs (0.75 mg total) into the skin every 7 days. 1 Syringe 3    famotidine (PEPCID) 40 MG tablet Take 1 tablet (40 mg total) by mouth nightly as needed for Heartburn. 90 tablet 0     No current facility-administered medications for this visit.          ROS  Review of Systems   Constitutional: Negative for chills, fever and unexpected weight change.   HENT: Negative for congestion, dental problem, ear pain, hearing loss, rhinorrhea, sore throat and trouble swallowing.    Eyes: Negative for discharge, redness and visual disturbance.   Respiratory: Negative for cough, chest tightness, shortness of breath and wheezing.    Cardiovascular: Positive for chest pain. Negative for palpitations and leg swelling.   Gastrointestinal: Negative for abdominal pain, constipation, diarrhea, nausea and vomiting.   Endocrine: Negative for polydipsia, polyphagia and polyuria.   Genitourinary: Negative for decreased urine volume, dysuria and hematuria.   Musculoskeletal: Positive for arthralgias. Negative for myalgias.   Skin: Negative for color change and rash.   Neurological: Negative for dizziness, weakness, light-headedness and headaches.   Psychiatric/Behavioral: Negative for decreased concentration, dysphoric mood, sleep  "disturbance and suicidal ideas.           Physical Exam  Vitals:    05/13/19 1459   BP: 128/76   BP Location: Right arm   Patient Position: Sitting   BP Method: Large (Manual)   Pulse: 83   Temp: 97.8 °F (36.6 °C)   TempSrc: Oral   SpO2: 97%   Weight: 102.2 kg (225 lb 5 oz)   Height: 5' 11" (1.803 m)    Body mass index is 31.42 kg/m².  Weight: 102.2 kg (225 lb 5 oz)   Height: 5' 11" (180.3 cm)   Physical Exam   Constitutional: He is oriented to person, place, and time. He appears well-developed and well-nourished. No distress.   HENT:   Head: Normocephalic and atraumatic.   Eyes: Pupils are equal, round, and reactive to light. Conjunctivae, EOM and lids are normal. No scleral icterus.   Neck: Full passive range of motion without pain. Neck supple. No JVD present. Carotid bruit is not present. No thyromegaly present.   Cardiovascular: Normal rate, regular rhythm, normal heart sounds and intact distal pulses. Exam reveals no S3, no S4 and no friction rub.   No murmur heard.  Pulmonary/Chest: Effort normal and breath sounds normal. He has no wheezes. He has no rhonchi. He has no rales.   Abdominal: Soft. Bowel sounds are normal. There is no tenderness.   Musculoskeletal: He exhibits no edema or tenderness.   Lymphadenopathy:        Head (right side): No submental and no submandibular adenopathy present.        Head (left side): No submental and no submandibular adenopathy present.     He has no cervical adenopathy.   Neurological: He is alert and oriented to person, place, and time.   Motor grossly intact.  Sensory grossly intact.  Symmetric facial movements palate elevated symmetrically tongue midline   Skin: Skin is warm and dry. No rash noted.   Psychiatric: He has a normal mood and affect. His speech is normal and behavior is normal. Thought content normal.         Health Maintenance       Date Due Completion Date    TETANUS VACCINE 09/28/1989 ---    Influenza Vaccine 08/01/2019 11/8/2018    Foot Exam 08/07/2019 " 8/7/2018    Hemoglobin A1c 08/10/2019 5/10/2019    Eye Exam 10/20/2019 10/20/2018    Urine Microalbumin 11/05/2019 11/5/2018    Lipid Panel 02/07/2020 2/7/2019    Low Dose Statin 03/12/2020 3/12/2019

## 2019-06-11 NOTE — PROGRESS NOTES
"Last 6 Patient Entered Readings                                          Most Recent A1c: 9.2% on 5/10/2019  (Goal: 7%)     Recent Readings 5/29/2019 5/28/2019 5/7/2019 4/26/2019 4/17/2019    Blood Glucose (mg/dL) 234 272 194 185 273          Patient states, "I am so sorry I have been busy dealing with my mother, who has brain cancer". Asked patient if he would like to be placed on hiatus. Patient reports that would be greatly appreciated. Will place patient on hiatus for 1 month.   "

## 2019-07-09 NOTE — PROGRESS NOTES
Digital Medicine: Health  Follow Up    Left voicemail to follow up with Mr. Christophe Carroll.  Patient's current A1C is 9.2%, not at goal.

## 2019-07-20 DIAGNOSIS — E11.9 TYPE 2 DIABETES MELLITUS: ICD-10-CM

## 2019-07-25 NOTE — PROGRESS NOTES
Sending message via [employer] to encourage more blood sugar readings. Will f/u once readings are being submitted.

## 2019-07-30 ENCOUNTER — PATIENT OUTREACH (OUTPATIENT)
Dept: ADMINISTRATIVE | Facility: HOSPITAL | Age: 49
End: 2019-07-30

## 2019-08-07 LAB
LEFT EYE DM RETINOPATHY: NEGATIVE
RIGHT EYE DM RETINOPATHY: NEGATIVE

## 2019-08-07 NOTE — PROGRESS NOTES
Last 6 Patient Entered Readings                                          Most Recent A1c: 9.2% on 5/10/2019  (Goal: 7%)     Recent Readings 7/30/2019 7/27/2019 5/29/2019 5/28/2019 5/7/2019    Blood Glucose (mg/dL) 215 262 234 272 194            Digital Medicine: Health  Follow Up    Left voicemail to follow up with Mr. Christophe Carroll.  Patient's A1C is not at goal, [9.2%]

## 2019-08-09 ENCOUNTER — LAB VISIT (OUTPATIENT)
Dept: LAB | Facility: HOSPITAL | Age: 49
End: 2019-08-09
Attending: INTERNAL MEDICINE
Payer: COMMERCIAL

## 2019-08-09 DIAGNOSIS — E11.9 TYPE 2 DIABETES MELLITUS WITHOUT COMPLICATION, WITHOUT LONG-TERM CURRENT USE OF INSULIN: Chronic | ICD-10-CM

## 2019-08-09 LAB
ALBUMIN SERPL BCP-MCNC: 4.5 G/DL (ref 3.5–5.2)
ALP SERPL-CCNC: 102 U/L (ref 55–135)
ALT SERPL W/O P-5'-P-CCNC: 33 U/L (ref 10–44)
ANION GAP SERPL CALC-SCNC: 9 MMOL/L (ref 8–16)
AST SERPL-CCNC: 28 U/L (ref 10–40)
BASOPHILS # BLD AUTO: 0.06 K/UL (ref 0–0.2)
BASOPHILS NFR BLD: 0.8 % (ref 0–1.9)
BILIRUB SERPL-MCNC: 0.4 MG/DL (ref 0.1–1)
BUN SERPL-MCNC: 9 MG/DL (ref 6–20)
CALCIUM SERPL-MCNC: 10.5 MG/DL (ref 8.7–10.5)
CHLORIDE SERPL-SCNC: 101 MMOL/L (ref 95–110)
CHOLEST SERPL-MCNC: 157 MG/DL (ref 120–199)
CHOLEST/HDLC SERPL: 4.8 {RATIO} (ref 2–5)
CO2 SERPL-SCNC: 25 MMOL/L (ref 23–29)
CREAT SERPL-MCNC: 1.1 MG/DL (ref 0.5–1.4)
DIFFERENTIAL METHOD: NORMAL
EOSINOPHIL # BLD AUTO: 0.2 K/UL (ref 0–0.5)
EOSINOPHIL NFR BLD: 2.3 % (ref 0–8)
ERYTHROCYTE [DISTWIDTH] IN BLOOD BY AUTOMATED COUNT: 13.3 % (ref 11.5–14.5)
EST. GFR  (AFRICAN AMERICAN): >60 ML/MIN/1.73 M^2
EST. GFR  (NON AFRICAN AMERICAN): >60 ML/MIN/1.73 M^2
ESTIMATED AVG GLUCOSE: 240 MG/DL (ref 68–131)
GLUCOSE SERPL-MCNC: 233 MG/DL (ref 70–110)
HBA1C MFR BLD HPLC: 10 % (ref 4–5.6)
HCT VFR BLD AUTO: 46 % (ref 40–54)
HDLC SERPL-MCNC: 33 MG/DL (ref 40–75)
HDLC SERPL: 21 % (ref 20–50)
HGB BLD-MCNC: 14.7 G/DL (ref 14–18)
IMM GRANULOCYTES # BLD AUTO: 0.02 K/UL (ref 0–0.04)
IMM GRANULOCYTES NFR BLD AUTO: 0.3 % (ref 0–0.5)
LDLC SERPL CALC-MCNC: 65.6 MG/DL (ref 63–159)
LYMPHOCYTES # BLD AUTO: 3.1 K/UL (ref 1–4.8)
LYMPHOCYTES NFR BLD: 43.1 % (ref 18–48)
MCH RBC QN AUTO: 27.6 PG (ref 27–31)
MCHC RBC AUTO-ENTMCNC: 32 G/DL (ref 32–36)
MCV RBC AUTO: 87 FL (ref 82–98)
MONOCYTES # BLD AUTO: 0.7 K/UL (ref 0.3–1)
MONOCYTES NFR BLD: 9.8 % (ref 4–15)
NEUTROPHILS # BLD AUTO: 3.1 K/UL (ref 1.8–7.7)
NEUTROPHILS NFR BLD: 43.7 % (ref 38–73)
NONHDLC SERPL-MCNC: 124 MG/DL
NRBC BLD-RTO: 0 /100 WBC
PLATELET # BLD AUTO: 267 K/UL (ref 150–350)
PMV BLD AUTO: 11.1 FL (ref 9.2–12.9)
POTASSIUM SERPL-SCNC: 4.6 MMOL/L (ref 3.5–5.1)
PROT SERPL-MCNC: 7.8 G/DL (ref 6–8.4)
RBC # BLD AUTO: 5.32 M/UL (ref 4.6–6.2)
SODIUM SERPL-SCNC: 135 MMOL/L (ref 136–145)
TRIGL SERPL-MCNC: 292 MG/DL (ref 30–150)
WBC # BLD AUTO: 7.07 K/UL (ref 3.9–12.7)

## 2019-08-09 PROCEDURE — 83036 HEMOGLOBIN GLYCOSYLATED A1C: CPT

## 2019-08-09 PROCEDURE — 36415 COLL VENOUS BLD VENIPUNCTURE: CPT | Mod: PO

## 2019-08-09 PROCEDURE — 80061 LIPID PANEL: CPT

## 2019-08-09 PROCEDURE — 85025 COMPLETE CBC W/AUTO DIFF WBC: CPT

## 2019-08-09 PROCEDURE — 80053 COMPREHEN METABOLIC PANEL: CPT

## 2019-08-13 ENCOUNTER — OFFICE VISIT (OUTPATIENT)
Dept: FAMILY MEDICINE | Facility: CLINIC | Age: 49
End: 2019-08-13
Payer: COMMERCIAL

## 2019-08-13 VITALS
HEIGHT: 71 IN | BODY MASS INDEX: 31.39 KG/M2 | DIASTOLIC BLOOD PRESSURE: 78 MMHG | WEIGHT: 224.19 LBS | OXYGEN SATURATION: 98 % | HEART RATE: 96 BPM | TEMPERATURE: 99 F | SYSTOLIC BLOOD PRESSURE: 120 MMHG

## 2019-08-13 DIAGNOSIS — E78.5 HYPERLIPIDEMIA ASSOCIATED WITH TYPE 2 DIABETES MELLITUS: Chronic | ICD-10-CM

## 2019-08-13 DIAGNOSIS — E11.69 HYPERLIPIDEMIA ASSOCIATED WITH TYPE 2 DIABETES MELLITUS: Chronic | ICD-10-CM

## 2019-08-13 DIAGNOSIS — E11.9 TYPE 2 DIABETES MELLITUS WITHOUT COMPLICATION, WITHOUT LONG-TERM CURRENT USE OF INSULIN: Chronic | ICD-10-CM

## 2019-08-13 DIAGNOSIS — E66.9 OBESITY, CLASS I, BMI 30-34.9: Chronic | ICD-10-CM

## 2019-08-13 DIAGNOSIS — Z23 NEED FOR TDAP VACCINATION: ICD-10-CM

## 2019-08-13 DIAGNOSIS — Z00.00 ROUTINE MEDICAL EXAM: Primary | ICD-10-CM

## 2019-08-13 PROCEDURE — 3074F PR MOST RECENT SYSTOLIC BLOOD PRESSURE < 130 MM HG: ICD-10-PCS | Mod: CPTII,S$GLB,, | Performed by: INTERNAL MEDICINE

## 2019-08-13 PROCEDURE — 90715 TDAP VACCINE GREATER THAN OR EQUAL TO 7YO IM: ICD-10-PCS | Mod: S$GLB,,, | Performed by: INTERNAL MEDICINE

## 2019-08-13 PROCEDURE — 90715 TDAP VACCINE 7 YRS/> IM: CPT | Mod: S$GLB,,, | Performed by: INTERNAL MEDICINE

## 2019-08-13 PROCEDURE — 3046F HEMOGLOBIN A1C LEVEL >9.0%: CPT | Mod: CPTII,S$GLB,, | Performed by: INTERNAL MEDICINE

## 2019-08-13 PROCEDURE — 90471 IMMUNIZATION ADMIN: CPT | Mod: S$GLB,,, | Performed by: INTERNAL MEDICINE

## 2019-08-13 PROCEDURE — 3046F PR MOST RECENT HEMOGLOBIN A1C LEVEL > 9.0%: ICD-10-PCS | Mod: CPTII,S$GLB,, | Performed by: INTERNAL MEDICINE

## 2019-08-13 PROCEDURE — 99999 PR PBB SHADOW E&M-EST. PATIENT-LVL III: ICD-10-PCS | Mod: PBBFAC,,, | Performed by: INTERNAL MEDICINE

## 2019-08-13 PROCEDURE — 90471 TDAP VACCINE GREATER THAN OR EQUAL TO 7YO IM: ICD-10-PCS | Mod: S$GLB,,, | Performed by: INTERNAL MEDICINE

## 2019-08-13 PROCEDURE — 99396 PR PREVENTIVE VISIT,EST,40-64: ICD-10-PCS | Mod: 25,S$GLB,, | Performed by: INTERNAL MEDICINE

## 2019-08-13 PROCEDURE — 3078F DIAST BP <80 MM HG: CPT | Mod: CPTII,S$GLB,, | Performed by: INTERNAL MEDICINE

## 2019-08-13 PROCEDURE — 3074F SYST BP LT 130 MM HG: CPT | Mod: CPTII,S$GLB,, | Performed by: INTERNAL MEDICINE

## 2019-08-13 PROCEDURE — 3078F PR MOST RECENT DIASTOLIC BLOOD PRESSURE < 80 MM HG: ICD-10-PCS | Mod: CPTII,S$GLB,, | Performed by: INTERNAL MEDICINE

## 2019-08-13 PROCEDURE — 99396 PREV VISIT EST AGE 40-64: CPT | Mod: 25,S$GLB,, | Performed by: INTERNAL MEDICINE

## 2019-08-13 PROCEDURE — 99999 PR PBB SHADOW E&M-EST. PATIENT-LVL III: CPT | Mod: PBBFAC,,, | Performed by: INTERNAL MEDICINE

## 2019-08-13 NOTE — PROGRESS NOTES
Assessment & Plan  Problem List Items Addressed This Visit        Cardiac/Vascular    Hyperlipidemia associated with type 2 diabetes mellitus (Chronic)    Current Assessment & Plan     The current medical regimen is effective;  continue present plan and medications.          Relevant Medications    dulaglutide (TRULICITY) 0.75 mg/0.5 mL PnIj       Endocrine    Type 2 diabetes mellitus without complication, without long-term current use of insulin (Chronic)    Current Assessment & Plan     Counseled on need to get back into healthier eating habits which he is motivated to do.  Continue metformin XR full dose.  Will contact the [facility] pharmacy today about his Trulicity         Relevant Medications    dulaglutide (TRULICITY) 0.75 mg/0.5 mL PnIj    Other Relevant Orders    Hemoglobin A1c    Comprehensive metabolic panel    Obesity, Class I, BMI 30-34.9 (Chronic)    Current Assessment & Plan     The patient is asked to make an attempt to improve diet and exercise patterns to aid in medical management of this problem. We specifically discussed cutting calorie intake by 500-1000 calories per day for a goal of a 1-2 pound weight loss and recommendations for a mostly plant based diet with limited red meats/refined grains/processed foods/added sugars.            Other Visit Diagnoses     Routine medical exam    -  Primary  -    Discussed healthy diet, regular exercise, necessary labs, age appropriate cancer screening, and routine vaccinations.       Need for Tdap vaccination    -  vaccinate    Relevant Orders    Tdap Vaccine            Health Maintenance reviewed, as above.    Follow-up: Follow up in about 3 months (around 11/13/2019) for DM follow up.    ______________________________________________________________________    Chief Complaint  Chief Complaint   Patient presents with    Annual Exam       HPI  Christophe Carroll is a 48 y.o. male with multiple medical diagnoses as listed in the medical history and problem  list that presents for routine physical.  Pt is known to me with his last appointment 5/13/2019.  He had labs prior to this OV that showed reassuring CBC, lipids.  CMP stable except for glucose of 233.  A1c went up to 10% <--9.2% <-- 9.4%.  Normal urine microalbumin/Cr.      He reports that he was not taking his medications as prescribed and he was eating a lot of fast food with his son being in town  He never received his GLP-1 from the [facility] pharmacy.        PAST MEDICAL HISTORY:  Past Medical History:   Diagnosis Date    Diabetes mellitus type II     Hypertension        PAST SURGICAL HISTORY:  Past Surgical History:   Procedure Laterality Date    removal of epididymal cyst         SOCIAL HISTORY:  Social History     Socioeconomic History    Marital status:      Spouse name: Not on file    Number of children: Not on file    Years of education: Not on file    Highest education level: Not on file   Occupational History     Employer: [employer]   Social Needs    Financial resource strain: Not on file    Food insecurity:     Worry: Not on file     Inability: Not on file    Transportation needs:     Medical: Not on file     Non-medical: Not on file   Tobacco Use    Smoking status: Never Smoker    Smokeless tobacco: Never Used   Substance and Sexual Activity    Alcohol use: Yes     Alcohol/week: 0.0 oz     Comment: occasional    Drug use: No    Sexual activity: Yes     Partners: Female     Comment:    Lifestyle    Physical activity:     Days per week: Not on file     Minutes per session: Not on file    Stress: Not on file   Relationships    Social connections:     Talks on phone: Not on file     Gets together: Not on file     Attends Yazdanism service: Not on file     Active member of club or organization: Not on file     Attends meetings of clubs or organizations: Not on file     Relationship status: Not on file   Other Topics Concern    Not on file   Social History Narrative     Not on file       FAMILY HISTORY:  Family History   Problem Relation Age of Onset    Diabetes Mother     Cancer Mother         lung with mets to brain    Diabetes Maternal Aunt     Kidney disease Maternal Aunt     Diabetes Maternal Grandmother        ALLERGIES AND MEDICATIONS: updated and reviewed.  Review of patient's allergies indicates:   Allergen Reactions    Lisinopril Rash    Penicillins Swelling     Current Outpatient Medications   Medication Sig Dispense Refill    atorvastatin (LIPITOR) 10 MG tablet TAKE 1 TABLET(10 MG) BY MOUTH EVERY DAY 90 tablet 0    metFORMIN (GLUCOPHAGE-XR) 500 MG 24 hr tablet Take 2 tablets (1,000 mg total) by mouth 2 (two) times daily with meals. 360 tablet 0    TRAVATAN Z 0.004 % Drop   6    dulaglutide (TRULICITY) 0.75 mg/0.5 mL PnIj Inject 0.5 mLs (0.75 mg total) into the skin every 7 days. 1 Syringe 3    famotidine (PEPCID) 40 MG tablet Take 1 tablet (40 mg total) by mouth nightly as needed for Heartburn. 90 tablet 0     No current facility-administered medications for this visit.          ROS  Review of Systems   Constitutional: Negative for chills, fever and unexpected weight change.   HENT: Negative for congestion, dental problem, ear pain, hearing loss, rhinorrhea, sore throat and trouble swallowing.    Eyes: Negative for discharge, redness and visual disturbance.   Respiratory: Negative for cough, chest tightness, shortness of breath and wheezing.    Cardiovascular: Negative for chest pain, palpitations and leg swelling.   Gastrointestinal: Negative for abdominal pain, constipation, diarrhea, nausea and vomiting.   Endocrine: Negative for polydipsia, polyphagia and polyuria.   Genitourinary: Negative for decreased urine volume, dysuria and hematuria.   Musculoskeletal: Negative for arthralgias and myalgias.   Skin: Negative for color change and rash.   Neurological: Negative for dizziness, weakness, light-headedness and headaches.   Psychiatric/Behavioral:  "Negative for decreased concentration, dysphoric mood, sleep disturbance and suicidal ideas.           Physical Exam  Vitals:    08/13/19 0811   BP: 120/78   Pulse: 96   Temp: 98.6 °F (37 °C)   SpO2: 98%   Weight: 101.7 kg (224 lb 3.3 oz)   Height: 5' 11" (1.803 m)    Body mass index is 31.27 kg/m².  Weight: 101.7 kg (224 lb 3.3 oz)   Height: 5' 11" (180.3 cm)   Physical Exam   Constitutional: He is oriented to person, place, and time. He appears well-developed and well-nourished. No distress.   HENT:   Head: Normocephalic and atraumatic.   Right Ear: Tympanic membrane, external ear and ear canal normal.   Left Ear: Tympanic membrane, external ear and ear canal normal.   Nose: Nose normal. No septal deviation. Right sinus exhibits no maxillary sinus tenderness and no frontal sinus tenderness. Left sinus exhibits no maxillary sinus tenderness and no frontal sinus tenderness.   Mouth/Throat: Uvula is midline, oropharynx is clear and moist and mucous membranes are normal. No tonsillar exudate.   Eyes: Pupils are equal, round, and reactive to light. Conjunctivae and EOM are normal. Right eye exhibits no discharge. Left eye exhibits no discharge. No scleral icterus.   Neck: Neck supple. No JVD present. No spinous process tenderness and no muscular tenderness present. Carotid bruit is not present. No tracheal deviation present. No thyroid mass and no thyromegaly present.   Cardiovascular: Normal rate, regular rhythm, normal heart sounds and intact distal pulses. Exam reveals no S3, no S4 and no friction rub.   No murmur heard.  Pulmonary/Chest: Effort normal and breath sounds normal. He has no wheezes. He has no rhonchi. He has no rales.   Abdominal: Soft. Bowel sounds are normal. He exhibits no distension. There is no tenderness. There is no rebound, no guarding and no CVA tenderness.   Musculoskeletal: Normal range of motion. He exhibits no edema or tenderness.   Lymphadenopathy:        Head (right side): No submental " and no submandibular adenopathy present.        Head (left side): No submental and no submandibular adenopathy present.     He has no cervical adenopathy.   Neurological: He is alert and oriented to person, place, and time. Coordination normal.   Motor grossly intact.  Sensation grossly normal.  Symmetric facial movements palate elevated symmetrically tongue midline    Skin: Skin is warm and dry. Capillary refill takes less than 2 seconds. No rash noted. No cyanosis. Nails show no clubbing.   Psychiatric: He has a normal mood and affect. His speech is normal and behavior is normal. Thought content normal. Cognition and memory are normal.       Protective Sensation (w/ 10 gram monofilament):  Right: Intact  Left: Intact    Visual Inspection:  Normal -  Bilateral    Pedal Pulses:   Right: Present  Left: Present    Posterior tibialis:   Right:Present  Left: Present      Health Maintenance       Date Due Completion Date    TETANUS VACCINE 09/28/1989 ---    Influenza Vaccine (1) 08/01/2019 11/8/2018    Foot Exam 08/07/2019 8/7/2018    Eye Exam 10/20/2019 10/20/2018    Hemoglobin A1c 11/09/2019 8/9/2019    Low Dose Statin 05/13/2020 5/13/2019    Lipid Panel 08/09/2020 8/9/2019    Urine Microalbumin 08/09/2020 8/9/2019

## 2019-08-14 RX ORDER — ATORVASTATIN CALCIUM 10 MG/1
TABLET, FILM COATED ORAL
Qty: 90 TABLET | Refills: 0 | Status: SHIPPED | OUTPATIENT
Start: 2019-08-14 | End: 2019-12-03 | Stop reason: SDUPTHER

## 2019-08-14 RX ORDER — METFORMIN HYDROCHLORIDE 500 MG/1
TABLET, EXTENDED RELEASE ORAL
Qty: 360 TABLET | Refills: 0 | Status: SHIPPED | OUTPATIENT
Start: 2019-08-14 | End: 2019-12-03 | Stop reason: SDUPTHER

## 2019-08-30 ENCOUNTER — PATIENT OUTREACH (OUTPATIENT)
Dept: OTHER | Facility: OTHER | Age: 49
End: 2019-08-30

## 2019-08-30 NOTE — PROGRESS NOTES
Last 6 Patient Entered Readings                                          Most Recent A1c: 10% on 8/9/2019  (Goal: 8%)     Recent Readings 8/30/2019 8/29/2019 8/28/2019 8/28/2019 8/27/2019    Blood Glucose (mg/dL) 157 146 217 168 93          Spoke with PharmD on the phone. She confirmed low blood sugar reading was inaccurate on 8/24/19 with a reading of 6. Patient confirmed that this was an error because he did not have enough blood on the test strip. Removing false reading from chart.     Also spoke with PharmD about follow-up call and will call patient in 2 weeks.

## 2019-08-30 NOTE — PROGRESS NOTES
"Last 6 Patient Entered Readings                                          Most Recent A1c: 10% on 8/9/2019  (Goal: 8%)     Recent Readings 8/30/2019 8/29/2019 8/28/2019 8/28/2019 8/27/2019    Blood Glucose (mg/dL) 157 146 217 168 93        Diabetes Medications     dulaglutide (TRULICITY) 0.75 mg/0.5 mL PnIj Inject 0.5 mLs (0.75 mg total) into the skin every 7 days.    metFORMIN (GLUCOPHAGE-XR) 500 MG 24 hr tablet TAKE 2 TABLETS(1000 MG) BY MOUTH TWICE DAILY WITH MEALS     Called patient for DM follow up. He says he has some numbness in his arm, but he thinks it is related to "nerves". He says this is not a new issue and he has previously discussed with PCP. Otherwise, he is doing well with no complaints. He says that after his last appointment with PCP, he has made some changes. He says he has been taking his medications daily as directed and trying his hardest to follow diabetic diet. Patient denies GI symptoms associated with DM medications. He denies history or pancreatitis and denies family/ personal history of thyroid cancers. BG was 217 mg/dL on 8/28/18 and patient says that was because he "cheated and ate a hamburger". BG was 6 mg/dL on 8/24/19. Patient confirmed that this was an error because he did not have enough blood on the test strip    1) A1c of 10% exceeds goal of < 7%. Continue current DM medications. Will likely increase trulicity to 1.5 mg at next encounter.   2) Patient knows to contact me with any non-urgent clinical changes or concerns. Go to ED or call [facility] for emergencies.   3) Will defer lifestyle counseling to digital medicine health . Continue lifestyle changes.   4) Will continue to follow up.     Michelle Villaseñor, Pharm.D.   Digital Medicine Clinical Pharmacist  [phone]    "

## 2019-09-06 ENCOUNTER — PATIENT OUTREACH (OUTPATIENT)
Dept: OTHER | Facility: OTHER | Age: 49
End: 2019-09-06

## 2019-09-06 DIAGNOSIS — E11.9 TYPE 2 DIABETES MELLITUS WITHOUT COMPLICATION, WITHOUT LONG-TERM CURRENT USE OF INSULIN: Chronic | ICD-10-CM

## 2019-09-06 NOTE — PROGRESS NOTES
Last 6 Patient Entered Readings                                          Most Recent A1c: 10% on 8/9/2019  (Goal: 7%)     Recent Readings 9/5/2019 9/4/2019 9/3/2019 9/3/2019 8/30/2019    Blood Glucose (mg/dL) 113 174 108 148 111        Diabetes Medications     trulicity 0.75 mg  Inject 0.75 mg into the skin once weekly    metFORMIN (GLUCOPHAGE-XR) 500 MG 24 hr tablet TAKE 2 TABLETS(1000 MG) BY MOUTH TWICE DAILY WITH MEALS     Called patient for DM follow up. He is doing well with no complaints. He says that he has used four trulicity 0.75 mg injections so far. He denies side effects. He says he is really trying and dedicated to changing his eating habits. He reports eating things like fish and zucchini noodles, fruit, nuts. He requests more information on diabetic friendly snack options.     1) Last A1c of 10% exceeds goal of < 7%. BG readings improved. Continue current DM medications. Increase trulicity to 1.5 mg once weekly. Next injection due Wed.   2) Reviewed diabetic friendly snack options  3) Patient knows to contact me with any non-urgent clinical changes or concerns. Go to ED or call [facility] for emergencies.   4) Will defer lifestyle counseling to digital medicine health .   5) Will continue to follow up.     Michelle Villaseñor, Pharm.D.   Digital Medicine Clinical Pharmacist  [phone]

## 2019-09-08 DIAGNOSIS — E11.9 TYPE 2 DIABETES MELLITUS: ICD-10-CM

## 2019-09-09 DIAGNOSIS — E11.9 TYPE 2 DIABETES MELLITUS: ICD-10-CM

## 2019-09-10 DIAGNOSIS — E11.9 TYPE 2 DIABETES MELLITUS: ICD-10-CM

## 2019-09-26 ENCOUNTER — PATIENT OUTREACH (OUTPATIENT)
Dept: OTHER | Facility: OTHER | Age: 49
End: 2019-09-26

## 2019-09-26 NOTE — PROGRESS NOTES
"Digital Medicine: Clinician Follow-Up    Called patient for follow up after increasing trulicity. He has noticed a decrease in appetite but is not bothered by this and says he still eats "enough". He denies nausea or GI upset. He is pleased with BG readings and continues to follow diabetic diet.     The history is provided by the patient.     Follow Up  Follow-up reason(s): medication change follow-up      Medication Change: dose increase    Patient did not start new medication.    Is patient tolerating med change?:  Yes  Routine Education Topics: eating patterns            Sleep Apnea  Patient not previously diagnosed with DIVINA and     Medication Affordability  Patient is currently not having problems affording medications      INTERVENTION(S)  recommended diet modifications, encouragement/support and goal setting    PLAN  patient verbalizes understanding and patient amenable to changes    Last A1c 10%. Goal < 7%. Continue current medications  Next A1c scheduled for 11/9. Based on digital BG readings, expecting to see a decrease in A1c.   Continue lifestyle changes.           Topic    Eye Exam      Last 6 Patient Entered Readings                                          Most Recent A1c: 10% on 8/9/2019  (Goal: 7%)     Recent Readings 9/25/2019 9/23/2019 9/19/2019 9/19/2019 9/18/2019    Blood Glucose (mg/dL) 129 121 126 119 109         Diabetes Medications     dulaglutide (TRULICITY) 1.5 mg/0.5 mL PnIj Inject 1.5 mg into the skin every 7 days.    metFORMIN (GLUCOPHAGE-XR) 500 MG 24 hr tablet TAKE 2 TABLETS(1000 MG) BY MOUTH TWICE DAILY WITH MEALS             "

## 2019-10-01 NOTE — PROGRESS NOTES
Patient's A1c has increased to 10%.     Blood sugar average has trend down. Patient reports he is doing much better since seeing the doctor. Patient reports his doctor scared him but reports he needed it. Patient is pleased with recent readings. Patient reports he is clean eating (salads, fish, fruit, increasing water). Patient reports he has decreased portion sizes. Patient reports trulicity has curved his appetite. Patient has lost weight. Patient reports he weighs 216. Patient is currently not logging meals.     Patient reports he is noticing a pain in his shoulder. Patient rpeorts he will talk with Dr. Menchaca.     Patient has not been focusing on exercise.

## 2019-10-08 ENCOUNTER — HOSPITAL ENCOUNTER (EMERGENCY)
Facility: HOSPITAL | Age: 49
Discharge: HOME | End: 2019-10-08
Attending: EMERGENCY MEDICINE
Payer: COMMERCIAL

## 2019-10-08 ENCOUNTER — PATIENT MESSAGE (OUTPATIENT)
Dept: FAMILY MEDICINE | Facility: CLINIC | Age: 49
End: 2019-10-08

## 2019-10-08 VITALS
BODY MASS INDEX: 30.8 KG/M2 | DIASTOLIC BLOOD PRESSURE: 88 MMHG | HEIGHT: 71 IN | RESPIRATION RATE: 18 BRPM | OXYGEN SATURATION: 100 % | WEIGHT: 220 LBS | TEMPERATURE: 98 F | SYSTOLIC BLOOD PRESSURE: 142 MMHG | HEART RATE: 90 BPM

## 2019-10-08 DIAGNOSIS — S16.1XXA CERVICAL STRAIN, ACUTE, INITIAL ENCOUNTER: ICD-10-CM

## 2019-10-08 DIAGNOSIS — M54.2 NECK PAIN: Primary | ICD-10-CM

## 2019-10-08 DIAGNOSIS — R07.89 CHEST TIGHTNESS: ICD-10-CM

## 2019-10-08 LAB
ALBUMIN SERPL-MCNC: 4.3 G/DL (ref 3.3–5.5)
ALP SERPL-CCNC: 82 U/L (ref 42–141)
BILIRUB SERPL-MCNC: 0.6 MG/DL (ref 0.2–1.6)
BILIRUBIN, POC UA: NEGATIVE
BLOOD, POC UA: NEGATIVE
BUN SERPL-MCNC: 8 MG/DL (ref 7–22)
CALCIUM SERPL-MCNC: 10 MG/DL (ref 8–10.3)
CHLORIDE SERPL-SCNC: 101 MMOL/L (ref 98–108)
CLARITY, POC UA: CLEAR
COLOR, POC UA: YELLOW
CREAT SERPL-MCNC: 1.1 MG/DL (ref 0.6–1.2)
GLUCOSE SERPL-MCNC: 146 MG/DL (ref 73–118)
GLUCOSE, POC UA: ABNORMAL
KETONES, POC UA: NEGATIVE
LEUKOCYTE EST, POC UA: NEGATIVE
NITRITE, POC UA: NEGATIVE
PH UR STRIP: 6.5 [PH]
POC ALT (SGPT): 25 U/L (ref 10–47)
POC AST (SGOT): 23 U/L (ref 11–38)
POC B-TYPE NATRIURETIC PEPTIDE: <5 PG/ML (ref 0–100)
POC CARDIAC TROPONIN I: 0 NG/ML
POC PTINR: 1.1 (ref 0.9–1.2)
POC PTWBT: 12.6 SEC (ref 9.7–14.3)
POC TCO2: 28 MMOL/L (ref 18–33)
POCT GLUCOSE: 144 MG/DL (ref 70–110)
POTASSIUM BLD-SCNC: 3.5 MMOL/L (ref 3.6–5.1)
PROTEIN, POC UA: NEGATIVE
PROTEIN, POC: 7.9 G/DL (ref 6.4–8.1)
SAMPLE: NORMAL
SAMPLE: NORMAL
SODIUM BLD-SCNC: 144 MMOL/L (ref 128–145)
SPECIFIC GRAVITY, POC UA: 1.02
UROBILINOGEN, POC UA: 0.2 E.U./DL

## 2019-10-08 PROCEDURE — 99285 EMERGENCY DEPT VISIT HI MDM: CPT | Mod: 25,ER

## 2019-10-08 PROCEDURE — 80053 COMPREHEN METABOLIC PANEL: CPT | Mod: ER

## 2019-10-08 PROCEDURE — 96374 THER/PROPH/DIAG INJ IV PUSH: CPT | Mod: ER

## 2019-10-08 PROCEDURE — 85025 COMPLETE CBC W/AUTO DIFF WBC: CPT | Mod: ER

## 2019-10-08 PROCEDURE — 25000003 PHARM REV CODE 250: Mod: ER | Performed by: EMERGENCY MEDICINE

## 2019-10-08 PROCEDURE — 84484 ASSAY OF TROPONIN QUANT: CPT | Mod: ER

## 2019-10-08 PROCEDURE — 85610 PROTHROMBIN TIME: CPT | Mod: ER

## 2019-10-08 PROCEDURE — 93005 ELECTROCARDIOGRAM TRACING: CPT | Mod: ER

## 2019-10-08 PROCEDURE — 83880 ASSAY OF NATRIURETIC PEPTIDE: CPT | Mod: ER

## 2019-10-08 PROCEDURE — 96375 TX/PRO/DX INJ NEW DRUG ADDON: CPT | Mod: ER

## 2019-10-08 PROCEDURE — 63600175 PHARM REV CODE 636 W HCPCS: Mod: ER | Performed by: EMERGENCY MEDICINE

## 2019-10-08 PROCEDURE — 81003 URINALYSIS AUTO W/O SCOPE: CPT | Mod: ER

## 2019-10-08 PROCEDURE — 93010 ELECTROCARDIOGRAM REPORT: CPT | Mod: ,,, | Performed by: INTERNAL MEDICINE

## 2019-10-08 PROCEDURE — 93010 EKG 12-LEAD: ICD-10-PCS | Mod: ,,, | Performed by: INTERNAL MEDICINE

## 2019-10-08 RX ORDER — NITROGLYCERIN 0.4 MG/1
0.4 TABLET SUBLINGUAL EVERY 5 MIN PRN
Status: DISCONTINUED | OUTPATIENT
Start: 2019-10-08 | End: 2019-10-08 | Stop reason: HOSPADM

## 2019-10-08 RX ORDER — KETOROLAC TROMETHAMINE 30 MG/ML
15 INJECTION, SOLUTION INTRAMUSCULAR; INTRAVENOUS
Status: COMPLETED | OUTPATIENT
Start: 2019-10-08 | End: 2019-10-08

## 2019-10-08 RX ORDER — FAMOTIDINE 20 MG/1
20 TABLET, FILM COATED ORAL 2 TIMES DAILY
Qty: 20 TABLET | Refills: 0 | Status: SHIPPED | OUTPATIENT
Start: 2019-10-08 | End: 2020-02-17

## 2019-10-08 RX ORDER — ASPIRIN 325 MG
325 TABLET ORAL
Status: COMPLETED | OUTPATIENT
Start: 2019-10-08 | End: 2019-10-08

## 2019-10-08 RX ORDER — ACETAMINOPHEN 500 MG
1000 TABLET ORAL EVERY 6 HOURS PRN
Qty: 30 TABLET | Refills: 0 | Status: SHIPPED | OUTPATIENT
Start: 2019-10-08 | End: 2020-02-17

## 2019-10-08 RX ORDER — NAPROXEN SODIUM 220 MG/1
81 TABLET, FILM COATED ORAL DAILY
Qty: 30 TABLET | Refills: 0 | Status: SHIPPED | OUTPATIENT
Start: 2019-10-08 | End: 2022-05-02

## 2019-10-08 RX ORDER — DICLOFENAC SODIUM 10 MG/G
2 GEL TOPICAL 4 TIMES DAILY
Qty: 1 TUBE | Refills: 0 | Status: SHIPPED | OUTPATIENT
Start: 2019-10-08 | End: 2023-06-05

## 2019-10-08 RX ORDER — POTASSIUM CHLORIDE 20 MEQ/1
40 TABLET, EXTENDED RELEASE ORAL
Status: COMPLETED | OUTPATIENT
Start: 2019-10-08 | End: 2019-10-08

## 2019-10-08 RX ORDER — IBUPROFEN 600 MG/1
600 TABLET ORAL EVERY 6 HOURS PRN
Qty: 20 TABLET | Refills: 0 | Status: SHIPPED | OUTPATIENT
Start: 2019-10-08 | End: 2020-02-17

## 2019-10-08 RX ORDER — CYCLOBENZAPRINE HCL 10 MG
10 TABLET ORAL
Status: COMPLETED | OUTPATIENT
Start: 2019-10-08 | End: 2019-10-08

## 2019-10-08 RX ORDER — DEXAMETHASONE SODIUM PHOSPHATE 4 MG/ML
8 INJECTION, SOLUTION INTRA-ARTICULAR; INTRALESIONAL; INTRAMUSCULAR; INTRAVENOUS; SOFT TISSUE
Status: COMPLETED | OUTPATIENT
Start: 2019-10-08 | End: 2019-10-08

## 2019-10-08 RX ORDER — CYCLOBENZAPRINE HCL 10 MG
10 TABLET ORAL 3 TIMES DAILY PRN
Qty: 15 TABLET | Refills: 0 | Status: SHIPPED | OUTPATIENT
Start: 2019-10-08 | End: 2019-10-13

## 2019-10-08 RX ADMIN — ASPIRIN 325 MG ORAL TABLET 325 MG: 325 PILL ORAL at 04:10

## 2019-10-08 RX ADMIN — DEXAMETHASONE SODIUM PHOSPHATE 8 MG: 4 INJECTION, SOLUTION INTRA-ARTICULAR; INTRALESIONAL; INTRAMUSCULAR; INTRAVENOUS; SOFT TISSUE at 06:10

## 2019-10-08 RX ADMIN — KETOROLAC TROMETHAMINE 15 MG: 30 INJECTION, SOLUTION INTRAMUSCULAR at 06:10

## 2019-10-08 RX ADMIN — CYCLOBENZAPRINE HYDROCHLORIDE 10 MG: 10 TABLET, FILM COATED ORAL at 06:10

## 2019-10-08 RX ADMIN — POTASSIUM CHLORIDE 40 MEQ: 20 TABLET, EXTENDED RELEASE ORAL at 06:10

## 2019-10-08 NOTE — ED PROVIDER NOTES
Encounter Date: 10/8/2019    SCRIBE #1 NOTE: I, Argelia Jaquez, am scribing for, and in the presence of,  Dr. Campoverde. I have scribed the following portions of the note - the EKG reading. Other sections scribed: HPI, ROS, PE.       History     Chief Complaint   Patient presents with    Neck Pain     pt states that for 3 wks he has been having pain to the left side of his neck and a shooting pain/numbness to the left arm. pt states he has a history of cervical ridiculopathy.    arm numbness     Christophe Carroll is a 49 y.o. male with history of type II DM and hypercholesteremia who presents to the ED complaining of 2/10 left-sided chest tightness and left neck pain and numbness radiating to the left arm starting today and the pain at its worst was 8/10. Patient reports initially intermittent left arm pain and numbness for 1 week. Leaning and stretching to the right relieves the pain. Patient states he has a history of a cervical pinched nerve which was noticed when patient had a MRI done couple years ago. Mom, sister, and aunt have cardio stents in place. Mom had her 1st stent in her late 50s. No PMHx of MI. Denies smoking cigarettes or drug use. Patient drinks EtOH occasionally. Denies use of viagara or Cialis. Patient is allergic to lisinopril and penicillin.    The history is provided by the patient. No  was used.     Review of patient's allergies indicates:   Allergen Reactions    Lisinopril Rash    Penicillins Swelling     Past Medical History:   Diagnosis Date    Diabetes mellitus type II     Hypercholesteremia      Past Surgical History:   Procedure Laterality Date    removal of epididymal cyst       Family History   Problem Relation Age of Onset    Diabetes Mother     Cancer Mother         lung with mets to brain    Diabetes Maternal Aunt     Kidney disease Maternal Aunt     Diabetes Maternal Grandmother      Social History     Tobacco Use    Smoking status: Never Smoker     Smokeless tobacco: Never Used   Substance Use Topics    Alcohol use: Yes     Alcohol/week: 0.0 standard drinks     Comment: occasional    Drug use: No     Review of Systems   Constitutional: Negative for fever.   HENT: Negative for rhinorrhea and sore throat.    Respiratory: Positive for chest tightness. Negative for shortness of breath.    Cardiovascular: Negative for chest pain and leg swelling.   Genitourinary: Negative for dysuria.   Musculoskeletal: Positive for neck pain.   Skin: Negative for rash.   Neurological: Positive for numbness (and tingling). Negative for headaches.   All other systems reviewed and are negative.      Physical Exam     Initial Vitals [10/08/19 1523]   BP Pulse Resp Temp SpO2   127/76 95 18 97.9 °F (36.6 °C) 99 %      MAP       --         Patient gave consent to have physical exam performed.    Physical Exam    Nursing note and vitals reviewed.  Constitutional: He appears well-developed and well-nourished.   HENT:   Head: Normocephalic and atraumatic.   Right Ear: External ear normal.   Left Ear: External ear normal.   Nose: Nose normal.   Mouth/Throat: Oropharynx is clear and moist.   Eyes: Conjunctivae and EOM are normal. Pupils are equal, round, and reactive to light.   Neck: Normal range of motion. Neck supple. Spinous process tenderness (left-sided) present.   Cardiovascular: Normal rate, regular rhythm, normal heart sounds and intact distal pulses. Exam reveals no gallop and no friction rub.    No murmur heard.  Pulmonary/Chest: Breath sounds normal. No stridor. No respiratory distress. He has no wheezes. He has no rhonchi. He has no rales. He exhibits tenderness.   Left anterior chest wall tenderness.   Abdominal: Soft. Bowel sounds are normal. He exhibits no distension and no mass. There is no tenderness. There is no rigidity, no rebound and no guarding.   Musculoskeletal: Normal range of motion.        Cervical back: He exhibits tenderness (left-sided).   Left paraspinous  tenderness.   Neurological: He is alert and oriented to person, place, and time. No cranial nerve deficit or sensory deficit. GCS score is 15. GCS eye subscore is 4. GCS verbal subscore is 5. GCS motor subscore is 6.   Skin: Skin is warm and dry. Capillary refill takes less than 2 seconds. No rash noted.   Psychiatric: He has a normal mood and affect. His behavior is normal.         ED Course   Procedures  Labs Reviewed   POCT URINALYSIS W/O SCOPE - Abnormal; Notable for the following components:       Result Value    Glucose, UA Trace (*)     All other components within normal limits   POCT GLUCOSE - Abnormal; Notable for the following components:    POCT Glucose 144 (*)     All other components within normal limits   POCT CMP - Abnormal; Notable for the following components:    POC Glucose 146 (*)     POC Potassium 3.5 (*)     All other components within normal limits   TROPONIN ISTAT   POCT CBC   POCT URINALYSIS W/O SCOPE   POCT CMP   POCT PROTIME-INR   POCT TROPONIN   POCT B-TYPE NATRIURETIC PEPTIDE (BNP)   ISTAT PROCEDURE   POCT B-TYPE NATRIURETIC PEPTIDE (BNP)     EKG Readings: (Independently Interpreted)   Initial Reading: No STEMI. Previous EKG Date: 5/13/19. Rhythm: Normal Sinus Rhythm. Heart Rate: 73. Axis: Normal.   Normal EKG. QTc normal at 401. When compared to prior EKG dated 5/13/19 rate increased by 1 bpm.       Imaging Results          X-Ray Chest PA And Lateral (Final result)  Result time 10/08/19 17:00:36    Final result by Uriel Del Real MD (10/08/19 17:00:36)                 Impression:      Normal radiographic appearance of the chest.      Electronically signed by: Uriel Del Real MD  Date:    10/08/2019  Time:    17:00             Narrative:    EXAMINATION:  XR CHEST PA AND LATERAL    CLINICAL HISTORY:  Chest Tightness;    TECHNIQUE:  PA and lateral views of the chest were performed.    COMPARISON:  None    FINDINGS:  The lungs are clear, with normal appearance of pulmonary vasculature and no  pleural effusion or pneumothorax.    The cardiac silhouette is normal in size. The hilar and mediastinal contours are unremarkable.    Bones are intact.                               CT Cervical Spine Without Contrast (Final result)  Result time 10/08/19 17:02:34    Final result by Martin Haley MD (10/08/19 17:02:34)                 Impression:      Worsening moderate to severe degenerative changes of the C4-5 and C5-6 and C6-7 level.      Electronically signed by: Martin Haley  Date:    10/08/2019  Time:    17:02             Narrative:    EXAMINATION:  CT CERVICAL SPINE WITHOUT CONTRAST    CLINICAL HISTORY:  Neck pain, first study;left sided neck pain radiating to left arm with history of Multilevel uncovertebral spurring causing foraminal stenosis, most severe in C5-6 on the right and C6-7 on the left;    TECHNIQUE:  Low dose axial images, sagittal and coronal reformations were performed though the cervical spine.  Contrast was not administered.    COMPARISON:  Plain films 10/28/2014    FINDINGS:  The atlantal dens interval appears normal.  No fracture compression deformity or or endplate erosion.  There is moderate to severe degenerative changes and disc space narrowing of the C4-5 and C5-6 and C6-7 level appear worse from 10/28/2014.  No subluxation.  Facets show normal alignment without fracture.  Spinous processes appear intact without subluxation.  Visualized trachea and lung and ribs appear normal.  C1 ring appears intact.  Odontoid appears intact.  No prevertebral soft tissue swelling.  The epiglottis appears normal.  Bone density is subjectively normal.                                 Medical Decision Making:   History:   Old Medical Records: I decided to obtain old medical records.  Independently Interpreted Test(s):   I have ordered and independently interpreted EKG Reading(s) - see prior notes  Clinical Tests:   Lab Tests: Reviewed and Ordered  Radiological Study: Ordered and Reviewed  Medical  Tests: Ordered and Reviewed  Chief complaint: left-sided chest tightness with left-sided neck and arm pain and numbness.  Differential diagnosis:    Treatment in the ED; PE, nitroglycerin, potassium, Decadron, Toradol, Flexeril, and aspirin.  Patient reports feeling better after treatment in the ER.      Patient presents with chest pain for greater than 24 hours.  Troponin is negative.  No STEMI on EKG and no acute issues on chest x-ray. Chest pain unlikely to be cardiac in origin.  I recommend follow up with Cardiology in 1 day for further evaluation of chest pain. Discussed need to return to the ED if chest pain worsens or does not resolve.    Ordered EKG, cervical CT, chest x-ray, UA, CBC, CMP, INR, troponin, BNP, and glucose.  Discussed treatment, prescriptions, labs, and imaging results.    Discharge home with aspirin, ibuprofen, Tylenol, Voltaren gel, Flexeril, and Pepcid  Fill and take prescriptions as directed.  Return to the ED if symptoms worsen or do not resolve.   Answered questions and discussed discharge plan.    Patient feels better and is ready for discharge.  Follow up with PCP/specialist in 1 day.            Scribe Attestation:   Scribe #1: I performed the above scribed service and the documentation accurately describes the services I performed. I attest to the accuracy of the note.     I, Dr. Constance Campoverde, personally performed the services described in this documentation. This document was produced by a scribe under my direction and in my presence. All medical record entries made by the scribe were at my direction and in my presence.  I have reviewed the chart and agree that the record reflects my personal performance and is accurate and complete. Constance Campoverde DO.     10/10/2019 7:51 AM             Clinical Impression:     1. Neck pain    2. Chest tightness    3. Cervical strain, acute, initial encounter                                   Constance Campoverde DO  10/10/19 0752

## 2019-10-08 NOTE — TELEPHONE ENCOUNTER
Pt stated he having numbness from neck down left arm. A sharp stabbing pain in neck. Advise pt to go to ED. Pt verbalized understanding.

## 2019-10-09 ENCOUNTER — OFFICE VISIT (OUTPATIENT)
Dept: FAMILY MEDICINE | Facility: CLINIC | Age: 49
End: 2019-10-09
Payer: COMMERCIAL

## 2019-10-09 VITALS
OXYGEN SATURATION: 98 % | HEIGHT: 71 IN | TEMPERATURE: 98 F | SYSTOLIC BLOOD PRESSURE: 138 MMHG | DIASTOLIC BLOOD PRESSURE: 80 MMHG | WEIGHT: 216.5 LBS | BODY MASS INDEX: 30.31 KG/M2 | HEART RATE: 111 BPM

## 2019-10-09 DIAGNOSIS — E11.9 TYPE 2 DIABETES MELLITUS WITHOUT COMPLICATION, WITHOUT LONG-TERM CURRENT USE OF INSULIN: Chronic | ICD-10-CM

## 2019-10-09 DIAGNOSIS — M54.12 CERVICAL RADICULOPATHY: Primary | Chronic | ICD-10-CM

## 2019-10-09 PROCEDURE — 3008F BODY MASS INDEX DOCD: CPT | Mod: CPTII,S$GLB,, | Performed by: INTERNAL MEDICINE

## 2019-10-09 PROCEDURE — 3075F SYST BP GE 130 - 139MM HG: CPT | Mod: CPTII,S$GLB,, | Performed by: INTERNAL MEDICINE

## 2019-10-09 PROCEDURE — 99214 PR OFFICE/OUTPT VISIT, EST, LEVL IV, 30-39 MIN: ICD-10-PCS | Mod: S$GLB,,, | Performed by: INTERNAL MEDICINE

## 2019-10-09 PROCEDURE — 3075F PR MOST RECENT SYSTOLIC BLOOD PRESS GE 130-139MM HG: ICD-10-PCS | Mod: CPTII,S$GLB,, | Performed by: INTERNAL MEDICINE

## 2019-10-09 PROCEDURE — 99999 PR PBB SHADOW E&M-EST. PATIENT-LVL III: CPT | Mod: PBBFAC,,, | Performed by: INTERNAL MEDICINE

## 2019-10-09 PROCEDURE — 3046F HEMOGLOBIN A1C LEVEL >9.0%: CPT | Mod: CPTII,S$GLB,, | Performed by: INTERNAL MEDICINE

## 2019-10-09 PROCEDURE — 99214 OFFICE O/P EST MOD 30 MIN: CPT | Mod: S$GLB,,, | Performed by: INTERNAL MEDICINE

## 2019-10-09 PROCEDURE — 99999 PR PBB SHADOW E&M-EST. PATIENT-LVL III: ICD-10-PCS | Mod: PBBFAC,,, | Performed by: INTERNAL MEDICINE

## 2019-10-09 PROCEDURE — 3079F PR MOST RECENT DIASTOLIC BLOOD PRESSURE 80-89 MM HG: ICD-10-PCS | Mod: CPTII,S$GLB,, | Performed by: INTERNAL MEDICINE

## 2019-10-09 PROCEDURE — 3079F DIAST BP 80-89 MM HG: CPT | Mod: CPTII,S$GLB,, | Performed by: INTERNAL MEDICINE

## 2019-10-09 PROCEDURE — 3046F PR MOST RECENT HEMOGLOBIN A1C LEVEL > 9.0%: ICD-10-PCS | Mod: CPTII,S$GLB,, | Performed by: INTERNAL MEDICINE

## 2019-10-09 PROCEDURE — 3008F PR BODY MASS INDEX (BMI) DOCUMENTED: ICD-10-PCS | Mod: CPTII,S$GLB,, | Performed by: INTERNAL MEDICINE

## 2019-10-09 RX ORDER — GABAPENTIN 100 MG/1
100 CAPSULE ORAL 2 TIMES DAILY PRN
Qty: 180 CAPSULE | Refills: 0 | Status: SHIPPED | OUTPATIENT
Start: 2019-10-09 | End: 2020-02-17

## 2019-10-09 NOTE — PROGRESS NOTES
Assessment & Plan  Problem List Items Addressed This Visit        Neuro    Cervical radiculopathy - Primary (Chronic)    Overview     CT 10/2019: Worsening moderate to severe degenerative changes of the C4-5 and C5-6 and C6-7 level         Current Assessment & Plan     Generally dose well with tylenol and ibuprofen.  Still gets period of radicular pain particularly at night.  Start low dose gabapentin at night.  Ok to use cyclobenzaprine but he prefers to avoid this if possible.          Relevant Medications    gabapentin (NEURONTIN) 100 MG capsule       Endocrine    Type 2 diabetes mellitus without complication, without long-term current use of insulin (Chronic)    Current Assessment & Plan     Greatly improved home readings.  Keep lab appointment.                  Health Maintenance reviewed, Eye appt due in the near future.    Follow-up: Follow up for pending lab results.    ______________________________________________________________________    Chief Complaint  Chief Complaint   Patient presents with    Hospital Follow Up     pt seen in ED       HPI  Christophe Eugene Carroll is a 49 y.o. male with multiple medical diagnoses as listed in the medical history and problem list that presents for ER follow up for cervical radiculopathy.  Pt is known to me with his last appointment 8/13/2019.      He had a reassuring evaluation that this was not cardiac in nature.  CT neck showed advancing DJD of the neck.  MRI 5 years ago with some left sided foraminal stenosis. Reports that pain at baseline is tolerable without meds.  Normally takes tylenol or ibuprofen as needed.  No numbness or weakness.      Taking and tolerating medications as prescribed without perceived side effects.  No CP, SOB, palpitations, hypoglycemic symptoms.  Home BS readings are looking great.       PAST MEDICAL HISTORY:  Past Medical History:   Diagnosis Date    Diabetes mellitus type II     Hypercholesteremia        PAST SURGICAL HISTORY:  Past  Surgical History:   Procedure Laterality Date    removal of epididymal cyst         SOCIAL HISTORY:  Social History     Socioeconomic History    Marital status:      Spouse name: Not on file    Number of children: Not on file    Years of education: Not on file    Highest education level: Not on file   Occupational History     Employer: [employer]   Social Needs    Financial resource strain: Not on file    Food insecurity:     Worry: Not on file     Inability: Not on file    Transportation needs:     Medical: Not on file     Non-medical: Not on file   Tobacco Use    Smoking status: Never Smoker    Smokeless tobacco: Never Used   Substance and Sexual Activity    Alcohol use: Yes     Alcohol/week: 0.0 standard drinks     Comment: occasional    Drug use: No    Sexual activity: Yes     Partners: Female     Comment:    Lifestyle    Physical activity:     Days per week: Not on file     Minutes per session: Not on file    Stress: Not on file   Relationships    Social connections:     Talks on phone: Not on file     Gets together: Not on file     Attends Gnosticist service: Not on file     Active member of club or organization: Not on file     Attends meetings of clubs or organizations: Not on file     Relationship status: Not on file   Other Topics Concern    Not on file   Social History Narrative    Not on file       FAMILY HISTORY:  Family History   Problem Relation Age of Onset    Diabetes Mother     Cancer Mother         lung with mets to brain    Diabetes Maternal Aunt     Kidney disease Maternal Aunt     Diabetes Maternal Grandmother        ALLERGIES AND MEDICATIONS: updated and reviewed.  Review of patient's allergies indicates:   Allergen Reactions    Lisinopril Rash    Penicillins Swelling     Current Outpatient Medications   Medication Sig Dispense Refill    acetaminophen (TYLENOL) 500 MG tablet Take 2 tablets (1,000 mg total) by mouth every 6 (six) hours as needed for  Pain. 30 tablet 0    aspirin 81 MG Chew Take 1 tablet (81 mg total) by mouth once daily. 30 tablet 0    atorvastatin (LIPITOR) 10 MG tablet TAKE 1 TABLET(10 MG) BY MOUTH EVERY DAY 90 tablet 0    cyclobenzaprine (FLEXERIL) 10 MG tablet Take 1 tablet (10 mg total) by mouth 3 (three) times daily as needed for Muscle spasms. 15 tablet 0    diclofenac sodium (VOLTAREN) 1 % Gel Apply 2 g topically 4 (four) times daily. Apply to painful area for 10 days 1 Tube 0    dulaglutide (TRULICITY) 1.5 mg/0.5 mL PnIj Inject 1.5 mg into the skin every 7 days. 4 Syringe 5    famotidine (PEPCID) 20 MG tablet Take 1 tablet (20 mg total) by mouth 2 (two) times daily. 20 tablet 0    ibuprofen (ADVIL,MOTRIN) 600 MG tablet Take 1 tablet (600 mg total) by mouth every 6 (six) hours as needed for Pain (Take with food as needed for mild-to-moderate pain). 20 tablet 0    metFORMIN (GLUCOPHAGE-XR) 500 MG 24 hr tablet TAKE 2 TABLETS(1000 MG) BY MOUTH TWICE DAILY WITH MEALS 360 tablet 0    TRAVATAN Z 0.004 % Drop   6    famotidine (PEPCID) 40 MG tablet Take 1 tablet (40 mg total) by mouth nightly as needed for Heartburn. (Patient not taking: Reported on 10/9/2019) 90 tablet 0    gabapentin (NEURONTIN) 100 MG capsule Take 1 capsule (100 mg total) by mouth 2 (two) times daily as needed (neck pain). 180 capsule 0     No current facility-administered medications for this visit.          ROS  Review of Systems   Constitutional: Negative for chills, fever and unexpected weight change.   HENT: Negative for congestion, dental problem, ear pain, hearing loss, rhinorrhea, sore throat and trouble swallowing.    Eyes: Negative for discharge, redness and visual disturbance.   Respiratory: Negative for cough, chest tightness, shortness of breath and wheezing.    Cardiovascular: Negative for chest pain, palpitations and leg swelling.   Gastrointestinal: Negative for abdominal pain, constipation, diarrhea, nausea and vomiting.   Endocrine: Negative for  "polydipsia, polyphagia and polyuria.   Genitourinary: Negative for decreased urine volume, dysuria and hematuria.   Musculoskeletal: Positive for neck pain. Negative for arthralgias, myalgias and neck stiffness.   Skin: Negative for color change and rash.   Neurological: Negative for dizziness, weakness, light-headedness and headaches.   Psychiatric/Behavioral: Negative for decreased concentration, dysphoric mood, sleep disturbance and suicidal ideas.           Physical Exam  Vitals:    10/09/19 0951   BP: 138/80   Pulse: (!) 111   Temp: 98.3 °F (36.8 °C)   SpO2: 98%   Weight: 98.2 kg (216 lb 7.9 oz)   Height: 5' 11" (1.803 m)    Body mass index is 30.19 kg/m².  Weight: 98.2 kg (216 lb 7.9 oz)   Height: 5' 11" (180.3 cm)   Physical Exam   Constitutional: He is oriented to person, place, and time. He appears well-developed and well-nourished. No distress.   HENT:   Head: Normocephalic and atraumatic.   Eyes: Pupils are equal, round, and reactive to light. Conjunctivae, EOM and lids are normal. No scleral icterus.   Neck: Full passive range of motion without pain. Neck supple. No JVD present. Carotid bruit is not present. No thyromegaly present.   Cardiovascular: Regular rhythm, normal heart sounds and intact distal pulses. Tachycardia present. Exam reveals no S3, no S4 and no friction rub.   No murmur heard.  Mild tachycardia   Pulmonary/Chest: Effort normal and breath sounds normal. He has no wheezes. He has no rhonchi. He has no rales.   Abdominal: Soft. Bowel sounds are normal. There is no tenderness.   Musculoskeletal: He exhibits no edema or tenderness.   Lymphadenopathy:        Head (right side): No submental and no submandibular adenopathy present.        Head (left side): No submental and no submandibular adenopathy present.     He has no cervical adenopathy.   Neurological: He is alert and oriented to person, place, and time.   Motor grossly intact.  Sensory grossly intact.  Symmetric facial movements palate " elevated symmetrically tongue midline   Skin: Skin is warm and dry. No rash noted.   Psychiatric: He has a normal mood and affect. His speech is normal and behavior is normal. Thought content normal.         Health Maintenance       Date Due Completion Date    Eye Exam 10/20/2019 10/20/2018    Hemoglobin A1c 11/09/2019 8/9/2019    Lipid Panel 08/09/2020 8/9/2019    Urine Microalbumin 08/09/2020 8/9/2019    Foot Exam 08/13/2020 8/13/2019    Low Dose Statin 10/09/2020 10/9/2019    TETANUS VACCINE 08/13/2029 8/13/2019

## 2019-10-09 NOTE — ASSESSMENT & PLAN NOTE
Generally dose well with tylenol and ibuprofen.  Still gets period of radicular pain particularly at night.  Start low dose gabapentin at night.  Ok to use cyclobenzaprine but he prefers to avoid this if possible.

## 2019-10-14 ENCOUNTER — TELEPHONE (OUTPATIENT)
Dept: FAMILY MEDICINE | Facility: CLINIC | Age: 49
End: 2019-10-14

## 2019-10-14 DIAGNOSIS — M54.12 CERVICAL RADICULOPATHY: Primary | Chronic | ICD-10-CM

## 2019-10-14 NOTE — TELEPHONE ENCOUNTER
Referral placed but I would recommend evaluation by interventional pain first as we discussed at his LOV.     Thank you,  Nathanael

## 2019-10-14 NOTE — TELEPHONE ENCOUNTER
----- Message from Heydi Adrian sent at 10/14/2019 11:08 AM CDT -----  Contact: SHILA NELSON [[id number]]  Name of Who is Calling : SHILA NELSON [[id number]]    What is the request in detail :     Patient would like a referral for neurosurgery  .....Please contact to further discuss and advise.    Can the clinic reply by [facility] :   Yes     What Number to Call Back : [phone]

## 2019-10-14 NOTE — TELEPHONE ENCOUNTER
Patient states having neck pain x 1 month has used neck creams and nothing works. Requesting neuro surgery referral. Please advise

## 2019-10-22 ENCOUNTER — TELEPHONE (OUTPATIENT)
Dept: FAMILY MEDICINE | Facility: CLINIC | Age: 49
End: 2019-10-22

## 2019-10-22 NOTE — TELEPHONE ENCOUNTER
I spoke with the patient regarding a referral to Pain Clinic, he refuse to schedule stating that he has not been having pain, but will contact the clinic if needed.

## 2019-10-30 ENCOUNTER — PATIENT OUTREACH (OUTPATIENT)
Dept: ADMINISTRATIVE | Facility: HOSPITAL | Age: 49
End: 2019-10-30

## 2019-11-07 ENCOUNTER — PATIENT OUTREACH (OUTPATIENT)
Dept: OTHER | Facility: OTHER | Age: 49
End: 2019-11-07

## 2019-11-09 ENCOUNTER — LAB VISIT (OUTPATIENT)
Dept: LAB | Facility: HOSPITAL | Age: 49
End: 2019-11-09
Attending: INTERNAL MEDICINE
Payer: COMMERCIAL

## 2019-11-09 DIAGNOSIS — E11.9 TYPE 2 DIABETES MELLITUS WITHOUT COMPLICATION, WITHOUT LONG-TERM CURRENT USE OF INSULIN: Chronic | ICD-10-CM

## 2019-11-09 LAB
ALBUMIN SERPL BCP-MCNC: 4.3 G/DL (ref 3.5–5.2)
ALP SERPL-CCNC: 82 U/L (ref 55–135)
ALT SERPL W/O P-5'-P-CCNC: 19 U/L (ref 10–44)
ANION GAP SERPL CALC-SCNC: 11 MMOL/L (ref 8–16)
AST SERPL-CCNC: 19 U/L (ref 10–40)
BILIRUB SERPL-MCNC: 0.4 MG/DL (ref 0.1–1)
BUN SERPL-MCNC: 9 MG/DL (ref 6–20)
CALCIUM SERPL-MCNC: 9.8 MG/DL (ref 8.7–10.5)
CHLORIDE SERPL-SCNC: 104 MMOL/L (ref 95–110)
CO2 SERPL-SCNC: 23 MMOL/L (ref 23–29)
CREAT SERPL-MCNC: 1.1 MG/DL (ref 0.5–1.4)
EST. GFR  (AFRICAN AMERICAN): >60 ML/MIN/1.73 M^2
EST. GFR  (NON AFRICAN AMERICAN): >60 ML/MIN/1.73 M^2
ESTIMATED AVG GLUCOSE: 163 MG/DL (ref 68–131)
GLUCOSE SERPL-MCNC: 127 MG/DL (ref 70–110)
HBA1C MFR BLD HPLC: 7.3 % (ref 4–5.6)
POTASSIUM SERPL-SCNC: 4.2 MMOL/L (ref 3.5–5.1)
PROT SERPL-MCNC: 7.7 G/DL (ref 6–8.4)
SODIUM SERPL-SCNC: 138 MMOL/L (ref 136–145)

## 2019-11-09 PROCEDURE — 83036 HEMOGLOBIN GLYCOSYLATED A1C: CPT

## 2019-11-09 PROCEDURE — 36415 COLL VENOUS BLD VENIPUNCTURE: CPT | Mod: PO

## 2019-11-09 PROCEDURE — 80053 COMPREHEN METABOLIC PANEL: CPT

## 2019-11-13 ENCOUNTER — OFFICE VISIT (OUTPATIENT)
Dept: FAMILY MEDICINE | Facility: CLINIC | Age: 49
End: 2019-11-13
Payer: COMMERCIAL

## 2019-11-13 ENCOUNTER — PATIENT OUTREACH (OUTPATIENT)
Dept: ADMINISTRATIVE | Facility: HOSPITAL | Age: 49
End: 2019-11-13

## 2019-11-13 VITALS
BODY MASS INDEX: 30.13 KG/M2 | SYSTOLIC BLOOD PRESSURE: 110 MMHG | WEIGHT: 215.19 LBS | TEMPERATURE: 98 F | OXYGEN SATURATION: 98 % | HEART RATE: 101 BPM | DIASTOLIC BLOOD PRESSURE: 80 MMHG | HEIGHT: 71 IN

## 2019-11-13 DIAGNOSIS — M54.12 CERVICAL RADICULOPATHY: Chronic | ICD-10-CM

## 2019-11-13 DIAGNOSIS — E11.9 TYPE 2 DIABETES MELLITUS WITHOUT COMPLICATION, WITHOUT LONG-TERM CURRENT USE OF INSULIN: Primary | Chronic | ICD-10-CM

## 2019-11-13 DIAGNOSIS — R07.89 ATYPICAL CHEST PAIN: ICD-10-CM

## 2019-11-13 PROCEDURE — 99999 PR PBB SHADOW E&M-EST. PATIENT-LVL III: ICD-10-PCS | Mod: PBBFAC,,, | Performed by: INTERNAL MEDICINE

## 2019-11-13 PROCEDURE — 3008F BODY MASS INDEX DOCD: CPT | Mod: CPTII,S$GLB,, | Performed by: INTERNAL MEDICINE

## 2019-11-13 PROCEDURE — 3079F PR MOST RECENT DIASTOLIC BLOOD PRESSURE 80-89 MM HG: ICD-10-PCS | Mod: CPTII,S$GLB,, | Performed by: INTERNAL MEDICINE

## 2019-11-13 PROCEDURE — 99214 OFFICE O/P EST MOD 30 MIN: CPT | Mod: S$GLB,,, | Performed by: INTERNAL MEDICINE

## 2019-11-13 PROCEDURE — 3074F SYST BP LT 130 MM HG: CPT | Mod: CPTII,S$GLB,, | Performed by: INTERNAL MEDICINE

## 2019-11-13 PROCEDURE — 3079F DIAST BP 80-89 MM HG: CPT | Mod: CPTII,S$GLB,, | Performed by: INTERNAL MEDICINE

## 2019-11-13 PROCEDURE — 99214 PR OFFICE/OUTPT VISIT, EST, LEVL IV, 30-39 MIN: ICD-10-PCS | Mod: S$GLB,,, | Performed by: INTERNAL MEDICINE

## 2019-11-13 PROCEDURE — 3008F PR BODY MASS INDEX (BMI) DOCUMENTED: ICD-10-PCS | Mod: CPTII,S$GLB,, | Performed by: INTERNAL MEDICINE

## 2019-11-13 PROCEDURE — 99999 PR PBB SHADOW E&M-EST. PATIENT-LVL III: CPT | Mod: PBBFAC,,, | Performed by: INTERNAL MEDICINE

## 2019-11-13 PROCEDURE — 3074F PR MOST RECENT SYSTOLIC BLOOD PRESSURE < 130 MM HG: ICD-10-PCS | Mod: CPTII,S$GLB,, | Performed by: INTERNAL MEDICINE

## 2019-11-13 NOTE — PROGRESS NOTES
"Assessment & Plan  Problem List Items Addressed This Visit        Neuro    Cervical radiculopathy (Chronic)    Overview     CT 10/2019: Worsening moderate to severe degenerative changes of the C4-5 and C5-6 and C6-7 level         Current Assessment & Plan     Discussed evaluation for healthy neck program and he would like to be evaluated fo yan         Relevant Orders    Ambulatory consult to [facility]       Endocrine    Type 2 diabetes mellitus without complication, without long-term current use of insulin - Primary (Chronic)    Current Assessment & Plan     Greatly improved.  Keep up the great work!          Relevant Orders    Hemoglobin A1c      Other Visit Diagnoses     Atypical chest pain    -  Associated with eating and can perform well above 4 METS without symptoms.  Monitor             Health Maintenance reviewed, will request eye exam.    Follow-up: Follow up in about 3 months (around 2/13/2020) for virtual visit DM follow up.    ______________________________________________________________________    Chief Complaint  Chief Complaint   Patient presents with    Diabetes    Neck Pain    Follow-up       HPI  Christophe Eugene Carroll is a 49 y.o. male with multiple medical diagnoses as listed in the medical history and problem list that presents for DM and neck pain follow up.  Pt is known to me with his last appointment 10/9/2019.  He had labs prior to this OV that showed reassuring CMP, A1c down to 7.3% from 10%.    Taking and tolerating medications as prescribed without perceived side effects.  No CP, SOB, palpitations, hypoglycemic symptoms.  He has made great changes with his eating habits as well.  Cut away back on eating out/fast foods.  Also no longer missing doses of his meds    Neck pain is doing much better.  Still having some radiation of numbness into the left arm.  Sometimes associated with chest tightness.  Can "jog" up 3 flights of stairs at work without any symptoms.  Occurs " associated with indigestion as well.       PAST MEDICAL HISTORY:  Past Medical History:   Diagnosis Date    Diabetes mellitus type II     Hypercholesteremia        PAST SURGICAL HISTORY:  Past Surgical History:   Procedure Laterality Date    removal of epididymal cyst         SOCIAL HISTORY:  Social History     Socioeconomic History    Marital status:      Spouse name: Not on file    Number of children: Not on file    Years of education: Not on file    Highest education level: Not on file   Occupational History     Employer: [employer]   Social Needs    Financial resource strain: Not on file    Food insecurity:     Worry: Not on file     Inability: Not on file    Transportation needs:     Medical: Not on file     Non-medical: Not on file   Tobacco Use    Smoking status: Never Smoker    Smokeless tobacco: Never Used   Substance and Sexual Activity    Alcohol use: Yes     Alcohol/week: 0.0 standard drinks     Comment: occasional    Drug use: No    Sexual activity: Yes     Partners: Female     Comment:    Lifestyle    Physical activity:     Days per week: Not on file     Minutes per session: Not on file    Stress: Not on file   Relationships    Social connections:     Talks on phone: Not on file     Gets together: Not on file     Attends Jewish service: Not on file     Active member of club or organization: Not on file     Attends meetings of clubs or organizations: Not on file     Relationship status: Not on file   Other Topics Concern    Not on file   Social History Narrative    Not on file       FAMILY HISTORY:  Family History   Problem Relation Age of Onset    Diabetes Mother     Cancer Mother         lung with mets to brain    Diabetes Maternal Aunt     Kidney disease Maternal Aunt     Diabetes Maternal Grandmother        ALLERGIES AND MEDICATIONS: updated and reviewed.  Review of patient's allergies indicates:   Allergen Reactions    Lisinopril Rash    Penicillins  Swelling     Current Outpatient Medications   Medication Sig Dispense Refill    aspirin 81 MG Chew Take 1 tablet (81 mg total) by mouth once daily. 30 tablet 0    atorvastatin (LIPITOR) 10 MG tablet TAKE 1 TABLET(10 MG) BY MOUTH EVERY DAY 90 tablet 0    dulaglutide (TRULICITY) 1.5 mg/0.5 mL PnIj Inject 1.5 mg into the skin every 7 days. 4 Syringe 5    metFORMIN (GLUCOPHAGE-XR) 500 MG 24 hr tablet TAKE 2 TABLETS(1000 MG) BY MOUTH TWICE DAILY WITH MEALS 360 tablet 0    TRAVATAN Z 0.004 % Drop   6    acetaminophen (TYLENOL) 500 MG tablet Take 2 tablets (1,000 mg total) by mouth every 6 (six) hours as needed for Pain. (Patient not taking: Reported on 11/13/2019) 30 tablet 0    diclofenac sodium (VOLTAREN) 1 % Gel Apply 2 g topically 4 (four) times daily. Apply to painful area for 10 days 1 Tube 0    famotidine (PEPCID) 20 MG tablet Take 1 tablet (20 mg total) by mouth 2 (two) times daily. (Patient not taking: Reported on 11/13/2019) 20 tablet 0    famotidine (PEPCID) 40 MG tablet Take 1 tablet (40 mg total) by mouth nightly as needed for Heartburn. (Patient not taking: Reported on 10/9/2019) 90 tablet 0    gabapentin (NEURONTIN) 100 MG capsule Take 1 capsule (100 mg total) by mouth 2 (two) times daily as needed (neck pain). (Patient not taking: Reported on 11/13/2019) 180 capsule 0    ibuprofen (ADVIL,MOTRIN) 600 MG tablet Take 1 tablet (600 mg total) by mouth every 6 (six) hours as needed for Pain (Take with food as needed for mild-to-moderate pain). (Patient not taking: Reported on 11/13/2019) 20 tablet 0     No current facility-administered medications for this visit.          ROS  Review of Systems   Constitutional: Negative for chills, fever and unexpected weight change.   HENT: Negative for congestion, dental problem, ear pain, hearing loss, rhinorrhea, sore throat and trouble swallowing.    Eyes: Negative for discharge, redness and visual disturbance.   Respiratory: Negative for cough, chest tightness,  "shortness of breath and wheezing.    Cardiovascular: Negative for chest pain, palpitations and leg swelling.   Gastrointestinal: Negative for abdominal pain, constipation, diarrhea, nausea and vomiting.   Endocrine: Negative for polydipsia, polyphagia and polyuria.   Genitourinary: Negative for decreased urine volume, dysuria and hematuria.   Musculoskeletal: Negative for arthralgias and myalgias.   Skin: Negative for color change and rash.   Neurological: Negative for dizziness, weakness, light-headedness and headaches.   Psychiatric/Behavioral: Negative for decreased concentration, dysphoric mood, sleep disturbance and suicidal ideas.           Physical Exam  Vitals:    11/13/19 0813   BP: 110/80   Pulse: 101   Temp: 98.3 °F (36.8 °C)   SpO2: 98%   Weight: 97.6 kg (215 lb 2.7 oz)   Height: 5' 11" (1.803 m)    Body mass index is 30.01 kg/m².  Weight: 97.6 kg (215 lb 2.7 oz)   Height: 5' 11" (180.3 cm)   Physical Exam   Constitutional: He is oriented to person, place, and time. He appears well-developed and well-nourished. No distress.   HENT:   Head: Normocephalic and atraumatic.   Eyes: Pupils are equal, round, and reactive to light. Conjunctivae and EOM are normal.   Cardiovascular: Normal rate, regular rhythm and intact distal pulses.   Pulmonary/Chest: Effort normal and breath sounds normal. No respiratory distress.   Neurological: He is alert and oriented to person, place, and time.   Symmetric facial movements palate elevated symmetrically tongue midline    Skin: Skin is warm and dry.   Psychiatric: He has a normal mood and affect. His behavior is normal. Thought content normal.         Health Maintenance       Date Due Completion Date    Eye Exam 10/20/2019 10/20/2018    Hemoglobin A1c 02/09/2020 11/9/2019    Lipid Panel 08/09/2020 8/9/2019    Urine Microalbumin 08/09/2020 8/9/2019    Foot Exam 08/13/2020 8/13/2019    Low Dose Statin 10/10/2020 10/10/2019    TETANUS VACCINE 08/13/2029 8/13/2019            "

## 2019-11-19 ENCOUNTER — PATIENT OUTREACH (OUTPATIENT)
Dept: OTHER | Facility: OTHER | Age: 49
End: 2019-11-19

## 2019-11-19 NOTE — PROGRESS NOTES
Digital Medicine: Clinician Follow-Up    Brief encounter as patient was entering a meeting  He is pleased with A1C improvement. Confirms that last BG reading was 11/1/19.      The history is provided by the patient.     Follow Up  Follow-up reason(s): reading review          INTERVENTION(S)  encouragement/support    PLAN  patient verbalizes understanding and additional monitoring needed    Congratulated patient on significant improvement in A1c  Encouraged him to resume monitoring BG at home  Continue current regimen at this time and lifestyle changes. Could consider low dose SGLT-2 if SMBGs elevated. Renal function stable (eGFR > 60). History of mildly low Na in August, typically Na ~ 138.      There are no preventive care reminders to display for this patient.      Last 6 Patient Entered Readings                                       Most Recent A1c: 7.3% on 11/9/2019  (Goal: 7%)     Recent Readings 11/1/2019 10/30/2019 10/29/2019 10/28/2019 10/20/2019    Blood Glucose (mg/dL) 97 111 130 155 114             Diabetes Medications             dulaglutide (TRULICITY) 1.5 mg/0.5 mL PnIj Inject 1.5 mg into the skin every 7 days.    metFORMIN (GLUCOPHAGE-XR) 500 MG 24 hr tablet TAKE 2 TABLETS(1000 MG) BY MOUTH TWICE DAILY WITH MEALS               Screenings

## 2019-12-03 DIAGNOSIS — E11.9 TYPE 2 DIABETES MELLITUS WITHOUT COMPLICATION, WITHOUT LONG-TERM CURRENT USE OF INSULIN: Chronic | ICD-10-CM

## 2019-12-03 DIAGNOSIS — E78.5 HYPERLIPIDEMIA ASSOCIATED WITH TYPE 2 DIABETES MELLITUS: Chronic | ICD-10-CM

## 2019-12-03 DIAGNOSIS — E11.69 HYPERLIPIDEMIA ASSOCIATED WITH TYPE 2 DIABETES MELLITUS: Chronic | ICD-10-CM

## 2019-12-03 RX ORDER — ATORVASTATIN CALCIUM 10 MG/1
TABLET, FILM COATED ORAL
Qty: 90 TABLET | Refills: 0 | Status: SHIPPED | OUTPATIENT
Start: 2019-12-03 | End: 2020-03-27

## 2019-12-03 RX ORDER — METFORMIN HYDROCHLORIDE 500 MG/1
TABLET, EXTENDED RELEASE ORAL
Qty: 360 TABLET | Refills: 0 | Status: SHIPPED | OUTPATIENT
Start: 2019-12-03 | End: 2020-03-27

## 2019-12-04 NOTE — PROGRESS NOTES
"Digital Medicine: Health  Follow-Up    Encouraged more readings. Patient's A1c has improved tremendously. Pervious A1c was 10% and is currently 7.3%.     The history is provided by the patient. No  was used.     Follow Up  Follow-up reason(s): reading review and routine education      Readings are trending down due to lifestyle change.    Routine Education Topics: eating patterns and physical activity      INTERVENTION(S)  encouragement/support, denied resources and denied questions    PLAN  patient verbalizes understanding, patient amenable to changes and continue monitoring    There are no preventive care reminders to display for this patient.      Last 6 Patient Entered Readings                                          Most Recent A1c: 7.3% on 11/9/2019  (Goal: 7%)     Recent Readings 11/26/2019 11/25/2019 11/19/2019 11/1/2019 10/30/2019    Blood Glucose (mg/dL) 147 124 146 97 111             Diet Screening   Patient reports eating or drinking the following: fresh vegetables and fish He has the following dietary restrictions: diabetic    Barriers to a Healthy Diet: no barriers to healthy eating    Patient reports he recently lost 10-15 pounds. Patient reports he has been really focusing on eating healthy.       Intervention(s): portion control, calorie tracking and carb reduction    Assigning the following patient goals: reduce carbs    Physical Activity Screening   When asked if exercising, patient responded: no    He identified the following barriers to physical activity: lack of time and motivation    Patient has been focusing on dietary habits. Patient reports he is lacking motivation because he has seen improvement without exercise. Patient reports he no longer has his garage as an exercise room because it's now his sons room. Patient reports he knows what needs to be done. Patient states, "I need get off my lazy butt". Patient is planning to start 21 day fix (beach body). "     Intervention(s): goal setting     Assigning the following patient goal(s): participate in exercise weekly

## 2019-12-07 ENCOUNTER — PATIENT OUTREACH (OUTPATIENT)
Dept: ADMINISTRATIVE | Facility: HOSPITAL | Age: 49
End: 2019-12-07

## 2019-12-19 NOTE — PROGRESS NOTES
SMBGs reviewed and are variable  Health  spoke with patient 12/4/19 and discussed increasing physical activity  Continue with current regimen  Consider low dose SGLT-2 if BG does not continue to improve    Diabetes Medications             dulaglutide (TRULICITY) 1.5 mg/0.5 mL PnIj Inject 1.5 mg into the skin every 7 days.    metFORMIN (GLUCOPHAGE-XR) 500 MG 24 hr tablet TAKE 2 TABLETS(1000 MG) BY MOUTH TWICE DAILY WITH MEALS

## 2020-01-13 ENCOUNTER — PATIENT OUTREACH (OUTPATIENT)
Dept: OTHER | Facility: OTHER | Age: 50
End: 2020-01-13

## 2020-01-20 NOTE — PROGRESS NOTES
"Digital Medicine: Health  Follow-Up    Patient states, "I sometimes get some restless nights".     The history is provided by the patient. No  was used.     Follow Up  Follow-up reason(s): reading review and routine education      Routine Education Topics: eating patterns and physical activity      INTERVENTION(S)  encouragement/support, denied resources and denied questions    PLAN  patient verbalizes understanding, patient amenable to changes and continue monitoring      There are no preventive care reminders to display for this patient.      Last 6 Patient Entered Readings                                          Most Recent A1c: 7.3% on 11/9/2019  (Goal: 7%)     Recent Readings 1/17/2020 1/16/2020 1/14/2020 1/10/2020 1/3/2020    Blood Glucose (mg/dL) 117 136 159 147 128            Diet Screening   He has the following dietary restrictions: diabetic    Patient is doing portion control. Patient is eating more greens and incorporating some fruits. Patient is avoiding fried food, fast food, etc. Patient is going to use madeleine to log food intake. Patient reports he has not weighed himself in 2-3 weeks.     Intervention(s): portion control    Assigning the following patient goals: reduce portions and reduce carbs    Physical Activity Screening   When asked if exercising, patient responded: no    He identified the following barriers to physical activity: lack of time    Patient is continuing to focus on dietary habits. Patient has not started 21 day fix yet. Patient is looking forwarded to starting his exercise routine.     Intervention(s): goal setting     Assigning the following patient goal(s): increase physical activity        "

## 2020-01-21 ENCOUNTER — PATIENT OUTREACH (OUTPATIENT)
Dept: OTHER | Facility: OTHER | Age: 50
End: 2020-01-21

## 2020-01-21 NOTE — PROGRESS NOTES
Digital Medicine: Clinician Follow-Up    Patient working with health  to improve diet       The history is provided by the patient.     Follow Up  Follow-up reason(s): reading review      Adherence Barriers: patient forgets        INTERVENTION(S)  reviewed appropriate dose schedule, encouragement/support and denied questions    PLAN  patient verbalizes understanding and continue monitoring    SMBGs trending down since prior A1c and diet changes  Advised patient he can take all metformin XR tablets together once a day to improve adherence  Requested he continue checking BG at least 2-3 time per week and to vary timing AC-B/L/S  Repeat A1C scheduled 2/13/20 and PCP appointment 2/17/20      There are no preventive care reminders to display for this patient.      Last 6 Patient Entered Readings                                     Most Recent A1c: 7.3% on 11/9/2019  (Goal: 7%)     Recent Readings 1/17/2020 1/16/2020 1/14/2020 1/10/2020 1/3/2020    Blood Glucose (mg/dL) 117 136 159 147 128             Diabetes Medications             dulaglutide (TRULICITY) 1.5 mg/0.5 mL PnIj Inject 1.5 mg into the skin every 7 days.    metFORMIN (GLUCOPHAGE-XR) 500 MG 24 hr tablet TAKE 2 TABLETS(1000 MG) BY MOUTH TWICE DAILY WITH MEALS                           Medication Adherence Screening     does not miss Trulicity; misses occasional doses of metformin  He does not wonder if medications are working.  He knows purpose of medications.      Patient identified the following reasons for non-compliance: patient forgets

## 2020-02-13 ENCOUNTER — LAB VISIT (OUTPATIENT)
Dept: LAB | Facility: HOSPITAL | Age: 50
End: 2020-02-13
Attending: INTERNAL MEDICINE
Payer: COMMERCIAL

## 2020-02-13 DIAGNOSIS — E11.9 TYPE 2 DIABETES MELLITUS WITHOUT COMPLICATION, WITHOUT LONG-TERM CURRENT USE OF INSULIN: Chronic | ICD-10-CM

## 2020-02-13 PROCEDURE — 36415 COLL VENOUS BLD VENIPUNCTURE: CPT | Mod: PO

## 2020-02-13 PROCEDURE — 83036 HEMOGLOBIN GLYCOSYLATED A1C: CPT

## 2020-02-14 LAB
ESTIMATED AVG GLUCOSE: 157 MG/DL (ref 68–131)
HBA1C MFR BLD HPLC: 7.1 % (ref 4–5.6)

## 2020-02-17 ENCOUNTER — PATIENT MESSAGE (OUTPATIENT)
Dept: FAMILY MEDICINE | Facility: CLINIC | Age: 50
End: 2020-02-17

## 2020-02-17 ENCOUNTER — OFFICE VISIT (OUTPATIENT)
Dept: FAMILY MEDICINE | Facility: CLINIC | Age: 50
End: 2020-02-17
Payer: COMMERCIAL

## 2020-02-17 VITALS — BODY MASS INDEX: 30.13 KG/M2 | WEIGHT: 216 LBS

## 2020-02-17 DIAGNOSIS — E11.69 HYPERLIPIDEMIA ASSOCIATED WITH TYPE 2 DIABETES MELLITUS: ICD-10-CM

## 2020-02-17 DIAGNOSIS — N52.9 ERECTILE DYSFUNCTION, UNSPECIFIED ERECTILE DYSFUNCTION TYPE: Chronic | ICD-10-CM

## 2020-02-17 DIAGNOSIS — E66.9 OBESITY, CLASS I, BMI 30-34.9: Chronic | ICD-10-CM

## 2020-02-17 DIAGNOSIS — E78.5 HYPERLIPIDEMIA ASSOCIATED WITH TYPE 2 DIABETES MELLITUS: ICD-10-CM

## 2020-02-17 DIAGNOSIS — E11.9 TYPE 2 DIABETES MELLITUS WITHOUT COMPLICATION, WITHOUT LONG-TERM CURRENT USE OF INSULIN: Primary | Chronic | ICD-10-CM

## 2020-02-17 PROCEDURE — 3051F HG A1C>EQUAL 7.0%<8.0%: CPT | Mod: CPTII,GT,, | Performed by: INTERNAL MEDICINE

## 2020-02-17 PROCEDURE — 3008F PR BODY MASS INDEX (BMI) DOCUMENTED: ICD-10-PCS | Mod: CPTII,GT,, | Performed by: INTERNAL MEDICINE

## 2020-02-17 PROCEDURE — 99214 OFFICE O/P EST MOD 30 MIN: CPT | Mod: GT,,, | Performed by: INTERNAL MEDICINE

## 2020-02-17 PROCEDURE — 3008F BODY MASS INDEX DOCD: CPT | Mod: CPTII,GT,, | Performed by: INTERNAL MEDICINE

## 2020-02-17 PROCEDURE — 99214 PR OFFICE/OUTPT VISIT, EST, LEVL IV, 30-39 MIN: ICD-10-PCS | Mod: GT,,, | Performed by: INTERNAL MEDICINE

## 2020-02-17 PROCEDURE — 3051F PR MOST RECENT HEMOGLOBIN A1C LEVEL 7.0 - < 8.0%: ICD-10-PCS | Mod: CPTII,GT,, | Performed by: INTERNAL MEDICINE

## 2020-02-17 NOTE — ASSESSMENT & PLAN NOTE
The current medical regimen is effective;  continue present plan and medications. He is working on increasing exercise.  Recheck labs in 3 months.

## 2020-02-17 NOTE — PROGRESS NOTES
Assessment & Plan  Problem List Items Addressed This Visit        Cardiac/Vascular    Hyperlipidemia associated with type 2 diabetes mellitus (Chronic)    Current Assessment & Plan     The current medical regimen is effective;  continue present plan and medications.            Renal/    Erectile dysfunction (Chronic)    Current Assessment & Plan     Interested in Landon Chew product.  He will send me some information on this.             Endocrine    Type 2 diabetes mellitus without complication, without long-term current use of insulin - Primary (Chronic)    Current Assessment & Plan     The current medical regimen is effective;  continue present plan and medications. He is working on increasing exercise.  Recheck labs in 3 months.          Relevant Orders    Hemoglobin A1c    Obesity, Class I, BMI 30-34.9 (Chronic)    Current Assessment & Plan     The patient is asked to make an attempt to improve diet and exercise patterns to aid in medical management of this problem.                      The patient location is:  Patient Home   The chief complaint leading to consultation is: noted below  Visit type: Virtual visit with synchronous audio and video  Total time spent with patient: 16  Each patient to whom he or she provides medical services by telemedicine is:  (1) informed of the relationship between the physician and patient and the respective role of any other health care provider with respect to management of the patient; and (2) notified that he or she may decline to receive medical services by telemedicine and may withdraw from such care at any time.    Follow-up: Follow up in about 3 months (around 5/17/2020) for DM follow up.    ______________________________________________________________________    Chief Complaint  Chief Complaint   Patient presents with    Diabetes       HPI  Christophe Eugene Carroll is a 49 y.o. male with multiple medical diagnoses as listed in the medical history and problem list that  presents for DM ED obesity follow up via virtual visit.  Pt is known to me with his last appointment 11/13/2019.  A1c down to 7.1% now      Taking and tolerating medications as prescribed without perceived side effects.  No CP, SOB, palpitations, hypoglycemic symptoms.  He is doing well with diet and med adherence.  Just putting exercise back in his routine.      Erections still an issue with losing them.  Interested in Landon Chew product but the screening in take warned him about his eye drops.      He weight himself on the call with me today.  Stable.       PAST MEDICAL HISTORY:  Past Medical History:   Diagnosis Date    Diabetes mellitus type II     Hypercholesteremia        PAST SURGICAL HISTORY:  Past Surgical History:   Procedure Laterality Date    removal of epididymal cyst         SOCIAL HISTORY:  Social History     Socioeconomic History    Marital status:      Spouse name: Not on file    Number of children: Not on file    Years of education: Not on file    Highest education level: Not on file   Occupational History     Employer: [employer]   Social Needs    Financial resource strain: Not on file    Food insecurity:     Worry: Not on file     Inability: Not on file    Transportation needs:     Medical: Not on file     Non-medical: Not on file   Tobacco Use    Smoking status: Never Smoker    Smokeless tobacco: Never Used   Substance and Sexual Activity    Alcohol use: Yes     Alcohol/week: 0.0 standard drinks     Comment: occasional    Drug use: No    Sexual activity: Yes     Partners: Female     Comment:    Lifestyle    Physical activity:     Days per week: Not on file     Minutes per session: Not on file    Stress: Not on file   Relationships    Social connections:     Talks on phone: Not on file     Gets together: Not on file     Attends Episcopalian service: Not on file     Active member of club or organization: Not on file     Attends meetings of clubs or organizations:  Not on file     Relationship status: Not on file   Other Topics Concern    Not on file   Social History Narrative    Not on file       FAMILY HISTORY:  Family History   Problem Relation Age of Onset    Diabetes Mother     Cancer Mother         lung with mets to brain    Diabetes Maternal Aunt     Kidney disease Maternal Aunt     Diabetes Maternal Grandmother        ALLERGIES AND MEDICATIONS: updated and reviewed.  Review of patient's allergies indicates:   Allergen Reactions    Lisinopril Rash    Penicillins Swelling     Current Outpatient Medications   Medication Sig Dispense Refill    atorvastatin (LIPITOR) 10 MG tablet TAKE 1 TABLET(10 MG) BY MOUTH EVERY DAY 90 tablet 0    dulaglutide (TRULICITY) 1.5 mg/0.5 mL PnIj Inject 1.5 mg into the skin every 7 days. 4 Syringe 5    metFORMIN (GLUCOPHAGE-XR) 500 MG 24 hr tablet TAKE 2 TABLETS(1000 MG) BY MOUTH TWICE DAILY WITH MEALS 360 tablet 0    aspirin 81 MG Chew Take 1 tablet (81 mg total) by mouth once daily. 30 tablet 0    diclofenac sodium (VOLTAREN) 1 % Gel Apply 2 g topically 4 (four) times daily. Apply to painful area for 10 days 1 Tube 0    TRAVATAN Z 0.004 % Drop   6     No current facility-administered medications for this visit.          ROS  Review of Systems   Constitutional: Negative for chills, fever and unexpected weight change.   HENT: Negative for congestion, dental problem, ear pain, hearing loss, rhinorrhea, sore throat and trouble swallowing.    Eyes: Negative for discharge, redness and visual disturbance.   Respiratory: Negative for cough, chest tightness, shortness of breath and wheezing.    Cardiovascular: Negative for chest pain, palpitations and leg swelling.   Gastrointestinal: Negative for abdominal pain, constipation, diarrhea, nausea and vomiting.   Endocrine: Negative for polydipsia, polyphagia and polyuria.   Genitourinary: Negative for decreased urine volume, dysuria and hematuria.   Musculoskeletal: Negative for  arthralgias and myalgias.   Skin: Negative for color change and rash.   Neurological: Negative for dizziness, weakness, light-headedness and headaches.   Psychiatric/Behavioral: Negative for decreased concentration, dysphoric mood, sleep disturbance and suicidal ideas.           Physical Exam  Physical Exam   Constitutional: He is oriented to person, place, and time. He appears well-developed and well-nourished. No distress.   Eyes: Conjunctivae and EOM are normal.   Pulmonary/Chest: Effort normal.   Neurological: He is alert and oriented to person, place, and time.   Symmetric facial movements palate elevated symmetrically tongue midline          Health Maintenance       Date Due Completion Date    HIV Screening 09/28/1986 ---    Hemoglobin A1c 05/13/2020 2/13/2020    Eye Exam 08/07/2020 8/7/2019    Lipid Panel 08/09/2020 8/9/2019    Foot Exam 08/13/2020 8/13/2019    Urine Microalbumin 10/08/2020 10/8/2019    Low Dose Statin 12/03/2020 12/3/2019    TETANUS VACCINE 08/13/2029 8/13/2019

## 2020-02-26 ENCOUNTER — PATIENT MESSAGE (OUTPATIENT)
Dept: ADMINISTRATIVE | Facility: OTHER | Age: 50
End: 2020-02-26

## 2020-02-27 ENCOUNTER — PATIENT MESSAGE (OUTPATIENT)
Dept: FAMILY MEDICINE | Facility: CLINIC | Age: 50
End: 2020-02-27

## 2020-03-03 ENCOUNTER — PATIENT OUTREACH (OUTPATIENT)
Dept: OTHER | Facility: OTHER | Age: 50
End: 2020-03-03

## 2020-03-03 NOTE — PROGRESS NOTES
Digital Medicine: Health  Follow-Up    Encouraged more readings. Patient reports he is going to bring the kit to work so he can take more readings at different times.     The history is provided by the patient. No  was used.     Follow Up  Follow-up reason(s): reading review and routine education      Routine Education Topics: eating patterns, meal planning and physical activity      INTERVENTION(S)  encouragement/support, goal setting, denied resources and denied questions    PLAN  patient verbalizes understanding, patient amenable to changes and continue monitoring      There are no preventive care reminders to display for this patient.      Last 6 Patient Entered Readings                                          Most Recent A1c: 7.1% on 2/13/2020  (Goal: 7%)     Recent Readings 3/3/2020 2/27/2020 2/20/2020 2/19/2020 2/19/2020    Blood Glucose (mg/dL) 123 152 140 117 133             Diet Screening   He has the following dietary restrictions: diabetic    Patient reports he has been cooking meals at home participating in [employer] meals. Patient reports he has been eating a little later due to cooking meals at home (8:00/8:30p). Patient has cut out fast food.     Intervention(s): portion control, meal planning and calorie tracking    Assigning the following patient goals: meal plan and reduce carbs    Physical Activity Screening   When asked if exercising, patient responded: yesHis level of intensity when exercising is moderate.    He identified the following barriers to physical activity: no barriers to being active    Patient reports he started 21 day fix about 3 weeks ago. Patient reports he has been participating in the morning.     Intervention(s): goal setting and goal tracking     Assigning the following patient goal(s): 150 minutes of exercise per week    Medication Adherence Screening   He did not miss a dose this month.

## 2020-03-27 DIAGNOSIS — E11.9 TYPE 2 DIABETES MELLITUS WITHOUT COMPLICATION, WITHOUT LONG-TERM CURRENT USE OF INSULIN: Chronic | ICD-10-CM

## 2020-03-27 DIAGNOSIS — E78.5 HYPERLIPIDEMIA ASSOCIATED WITH TYPE 2 DIABETES MELLITUS: Chronic | ICD-10-CM

## 2020-03-27 DIAGNOSIS — E11.69 HYPERLIPIDEMIA ASSOCIATED WITH TYPE 2 DIABETES MELLITUS: Chronic | ICD-10-CM

## 2020-03-27 RX ORDER — ATORVASTATIN CALCIUM 10 MG/1
TABLET, FILM COATED ORAL
Qty: 90 TABLET | Refills: 0 | Status: SHIPPED | OUTPATIENT
Start: 2020-03-27 | End: 2020-09-20

## 2020-03-27 RX ORDER — METFORMIN HYDROCHLORIDE 500 MG/1
TABLET, EXTENDED RELEASE ORAL
Qty: 360 TABLET | Refills: 0 | Status: SHIPPED | OUTPATIENT
Start: 2020-03-27 | End: 2020-10-16

## 2020-04-07 ENCOUNTER — PATIENT OUTREACH (OUTPATIENT)
Dept: OTHER | Facility: OTHER | Age: 50
End: 2020-04-07

## 2020-04-07 DIAGNOSIS — E11.9 TYPE 2 DIABETES MELLITUS WITHOUT COMPLICATION, WITHOUT LONG-TERM CURRENT USE OF INSULIN: Primary | Chronic | ICD-10-CM

## 2020-04-30 ENCOUNTER — PATIENT OUTREACH (OUTPATIENT)
Dept: OTHER | Facility: OTHER | Age: 50
End: 2020-04-30

## 2020-05-11 ENCOUNTER — PATIENT MESSAGE (OUTPATIENT)
Dept: ADMINISTRATIVE | Facility: OTHER | Age: 50
End: 2020-05-11

## 2020-06-10 ENCOUNTER — OFFICE VISIT (OUTPATIENT)
Dept: FAMILY MEDICINE | Facility: CLINIC | Age: 50
End: 2020-06-10
Payer: COMMERCIAL

## 2020-06-10 VITALS
DIASTOLIC BLOOD PRESSURE: 80 MMHG | BODY MASS INDEX: 30.49 KG/M2 | HEIGHT: 71 IN | OXYGEN SATURATION: 98 % | SYSTOLIC BLOOD PRESSURE: 120 MMHG | WEIGHT: 217.81 LBS | TEMPERATURE: 97 F | HEART RATE: 98 BPM

## 2020-06-10 DIAGNOSIS — E66.9 OBESITY, CLASS I, BMI 30-34.9: Chronic | ICD-10-CM

## 2020-06-10 DIAGNOSIS — T78.40XA ALLERGIC REACTION, INITIAL ENCOUNTER: ICD-10-CM

## 2020-06-10 DIAGNOSIS — E11.9 TYPE 2 DIABETES MELLITUS WITHOUT COMPLICATION, WITHOUT LONG-TERM CURRENT USE OF INSULIN: Chronic | ICD-10-CM

## 2020-06-10 DIAGNOSIS — E78.5 HYPERLIPIDEMIA ASSOCIATED WITH TYPE 2 DIABETES MELLITUS: Chronic | ICD-10-CM

## 2020-06-10 DIAGNOSIS — L23.9 ALLERGIC DERMATITIS: Primary | ICD-10-CM

## 2020-06-10 DIAGNOSIS — E11.69 HYPERLIPIDEMIA ASSOCIATED WITH TYPE 2 DIABETES MELLITUS: Chronic | ICD-10-CM

## 2020-06-10 PROCEDURE — 3008F BODY MASS INDEX DOCD: CPT | Mod: CPTII,S$GLB,, | Performed by: NURSE PRACTITIONER

## 2020-06-10 PROCEDURE — 3074F PR MOST RECENT SYSTOLIC BLOOD PRESSURE < 130 MM HG: ICD-10-PCS | Mod: CPTII,S$GLB,, | Performed by: NURSE PRACTITIONER

## 2020-06-10 PROCEDURE — 96372 THER/PROPH/DIAG INJ SC/IM: CPT | Mod: S$GLB,,, | Performed by: NURSE PRACTITIONER

## 2020-06-10 PROCEDURE — 3051F PR MOST RECENT HEMOGLOBIN A1C LEVEL 7.0 - < 8.0%: ICD-10-PCS | Mod: CPTII,S$GLB,, | Performed by: NURSE PRACTITIONER

## 2020-06-10 PROCEDURE — 99999 PR PBB SHADOW E&M-EST. PATIENT-LVL IV: CPT | Mod: PBBFAC,,, | Performed by: NURSE PRACTITIONER

## 2020-06-10 PROCEDURE — 3008F PR BODY MASS INDEX (BMI) DOCUMENTED: ICD-10-PCS | Mod: CPTII,S$GLB,, | Performed by: NURSE PRACTITIONER

## 2020-06-10 PROCEDURE — 3079F DIAST BP 80-89 MM HG: CPT | Mod: CPTII,S$GLB,, | Performed by: NURSE PRACTITIONER

## 2020-06-10 PROCEDURE — 3079F PR MOST RECENT DIASTOLIC BLOOD PRESSURE 80-89 MM HG: ICD-10-PCS | Mod: CPTII,S$GLB,, | Performed by: NURSE PRACTITIONER

## 2020-06-10 PROCEDURE — 96372 PR INJECTION,THERAP/PROPH/DIAG2ST, IM OR SUBCUT: ICD-10-PCS | Mod: S$GLB,,, | Performed by: NURSE PRACTITIONER

## 2020-06-10 PROCEDURE — 99999 PR PBB SHADOW E&M-EST. PATIENT-LVL IV: ICD-10-PCS | Mod: PBBFAC,,, | Performed by: NURSE PRACTITIONER

## 2020-06-10 PROCEDURE — 3074F SYST BP LT 130 MM HG: CPT | Mod: CPTII,S$GLB,, | Performed by: NURSE PRACTITIONER

## 2020-06-10 PROCEDURE — 3051F HG A1C>EQUAL 7.0%<8.0%: CPT | Mod: CPTII,S$GLB,, | Performed by: NURSE PRACTITIONER

## 2020-06-10 PROCEDURE — 99214 PR OFFICE/OUTPT VISIT, EST, LEVL IV, 30-39 MIN: ICD-10-PCS | Mod: 25,S$GLB,, | Performed by: NURSE PRACTITIONER

## 2020-06-10 PROCEDURE — 99214 OFFICE O/P EST MOD 30 MIN: CPT | Mod: 25,S$GLB,, | Performed by: NURSE PRACTITIONER

## 2020-06-10 RX ORDER — DEXAMETHASONE SODIUM PHOSPHATE 4 MG/ML
8 INJECTION, SOLUTION INTRA-ARTICULAR; INTRALESIONAL; INTRAMUSCULAR; INTRAVENOUS; SOFT TISSUE
Status: COMPLETED | OUTPATIENT
Start: 2020-06-10 | End: 2020-06-10

## 2020-06-10 RX ADMIN — DEXAMETHASONE SODIUM PHOSPHATE 8 MG: 4 INJECTION, SOLUTION INTRA-ARTICULAR; INTRALESIONAL; INTRAMUSCULAR; INTRAVENOUS; SOFT TISSUE at 12:06

## 2020-06-10 NOTE — PROGRESS NOTES
History of Present Illness   Christophe Carroll is a 49 y.o. man with medical history as listed below who presents today for evaluation of rash x 3 days. He reports noticing swelling with rash to right side of nose, cheek, chin, and right upper lip with pruritus, gradually improving with Benadryl daily. He noticed this after cutting down and clearing tree branches and thought he was exposed to poison ivy or other plant irritant. He has had similar reaction when working in the yard in the past. He also reports noticing rash around injection site after his Trulicity injection 3 days prior that has since resolves- this has not occurred in the past with injections. He denies oral swelling, difficulty swallowing, or difficulty breathing. He has no additional complaints and is otherwise healthy on today's visit.    Past Medical History:   Diagnosis Date    Diabetes mellitus type II     Hypercholesteremia        Past Surgical History:   Procedure Laterality Date    removal of epididymal cyst         Social History     Socioeconomic History    Marital status:      Spouse name: Not on file    Number of children: Not on file    Years of education: Not on file    Highest education level: Not on file   Occupational History     Employer: [employer]   Social Needs    Financial resource strain: Not hard at all    Food insecurity:     Worry: Never true     Inability: Never true    Transportation needs:     Medical: No     Non-medical: No   Tobacco Use    Smoking status: Never Smoker    Smokeless tobacco: Never Used   Substance and Sexual Activity    Alcohol use: Yes     Alcohol/week: 0.0 standard drinks     Frequency: Monthly or less     Drinks per session: 1 or 2     Binge frequency: Never     Comment: occasional    Drug use: No    Sexual activity: Yes     Partners: Female     Comment:    Lifestyle    Physical activity:     Days per week: 0 days     Minutes per session: Not on file     "Stress: Not at all   Relationships    Social connections:     Talks on phone: More than three times a week     Gets together: Once a week     Attends Congregation service: Not on file     Active member of club or organization: No     Attends meetings of clubs or organizations: Never     Relationship status:    Other Topics Concern    Not on file   Social History Narrative    Not on file       Family History   Problem Relation Age of Onset    Diabetes Mother     Cancer Mother         lung with mets to brain    Diabetes Maternal Aunt     Kidney disease Maternal Aunt     Diabetes Maternal Grandmother        Review of Systems  Review of Systems   Constitutional: Negative for chills and fever.   HENT: Negative for congestion and sore throat.    Respiratory: Negative for cough, shortness of breath, wheezing and stridor.    Musculoskeletal: Negative for joint pain.   Skin: Positive for itching and rash.     A complete review of systems was otherwise negative.    Physical Exam  /80   Pulse 98   Temp 97.3 °F (36.3 °C) (Oral)   Ht 5' 11" (1.803 m)   Wt 98.8 kg (217 lb 13 oz)   SpO2 98%   BMI 30.38 kg/m²   General appearance: alert, appears stated age, cooperative and no distress  Lungs: clear to auscultation bilaterally  Heart: regular rate and rhythm, S1, S2 normal, no murmur, click, rub or gallop  Skin: dermatitis like rash with edema noted to right side of face and right upper lip  Neurologic: Grossly normal    Assessment/Plan  Allergic dermatitis  Unclear etiology as this does not appear to be plant dermatitis, possible reaction to Trulicity given reaction at injection site? Will treat with Decadron in clinic, close glucose monitoring. Continue Benadryl and Pepcid daily. Monitor for reaction with Trulicity dose this week- if continues would stop and switch to DPP-4. ER precautions discussed. RTC PRN.  -     dexamethasone injection 8 mg    Allergic reaction, initial encounter  As above.  -     " dexamethasone injection 8 mg    Type 2 diabetes mellitus without complication, without long-term current use of insulin  Due for 3 month follow-up.  Labs and follow-up scheduled.  -     Basic metabolic panel; Future; Expected date: 09/10/2020  -     Hemoglobin A1C; Future; Expected date: 09/10/2020    Hyperlipidemia associated with type 2 diabetes mellitus  The current medical regimen is effective;  continue present plan and medications.    Obesity, Class I, BMI 30-34.9  The patient is asked to make an attempt to improve diet and exercise patterns to aid in medical management of this problem.    Patient has verbalized understanding and is in agreement with plan of care.    Follow up in about 1 month (around 7/10/2020), or if symptoms worsen or fail to improve, for for diabetic follow-up.  Answers for HPI/ROS submitted by the patient on 6/10/2020   Rash  Chronicity: recurrent  Onset: in the past 7 days  Progression since onset: gradually improving  Affected locations: face, lips  Characteristics: itchiness  Exposed to: plant contact  anorexia: No  facial edema: Yes  fatigue: No  nail changes: No  rhinorrhea: No  Treatments tried: antihistamine  Improvement on treatment: significant  asthma: No  allergies: No  eczema: No  varicella: Yes

## 2020-06-12 ENCOUNTER — LAB VISIT (OUTPATIENT)
Dept: LAB | Facility: HOSPITAL | Age: 50
End: 2020-06-12
Attending: NURSE PRACTITIONER
Payer: COMMERCIAL

## 2020-06-12 DIAGNOSIS — E11.9 TYPE 2 DIABETES MELLITUS WITHOUT COMPLICATION, WITHOUT LONG-TERM CURRENT USE OF INSULIN: Chronic | ICD-10-CM

## 2020-06-12 LAB
ANION GAP SERPL CALC-SCNC: 13 MMOL/L (ref 8–16)
BUN SERPL-MCNC: 13 MG/DL (ref 6–20)
CALCIUM SERPL-MCNC: 9.6 MG/DL (ref 8.7–10.5)
CHLORIDE SERPL-SCNC: 101 MMOL/L (ref 95–110)
CO2 SERPL-SCNC: 25 MMOL/L (ref 23–29)
CREAT SERPL-MCNC: 1.3 MG/DL (ref 0.5–1.4)
EST. GFR  (AFRICAN AMERICAN): >60 ML/MIN/1.73 M^2
EST. GFR  (NON AFRICAN AMERICAN): >60 ML/MIN/1.73 M^2
GLUCOSE SERPL-MCNC: 198 MG/DL (ref 70–110)
POTASSIUM SERPL-SCNC: 4 MMOL/L (ref 3.5–5.1)
SODIUM SERPL-SCNC: 139 MMOL/L (ref 136–145)

## 2020-06-12 PROCEDURE — 80048 BASIC METABOLIC PNL TOTAL CA: CPT

## 2020-06-12 PROCEDURE — 83036 HEMOGLOBIN GLYCOSYLATED A1C: CPT

## 2020-06-12 PROCEDURE — 36415 COLL VENOUS BLD VENIPUNCTURE: CPT | Mod: PO

## 2020-06-13 LAB
ESTIMATED AVG GLUCOSE: 160 MG/DL (ref 68–131)
HBA1C MFR BLD HPLC: 7.2 % (ref 4–5.6)

## 2020-06-16 ENCOUNTER — TELEPHONE (OUTPATIENT)
Dept: FAMILY MEDICINE | Facility: CLINIC | Age: 50
End: 2020-06-16

## 2020-06-16 NOTE — TELEPHONE ENCOUNTER
Please schedule follow-up with Dr. Menchaca or myself for diabetic follow-up visit.    Thanks!  ELIZABETH Wright

## 2020-08-15 DIAGNOSIS — E11.9 TYPE 2 DIABETES MELLITUS WITHOUT COMPLICATION, WITHOUT LONG-TERM CURRENT USE OF INSULIN: Chronic | ICD-10-CM

## 2020-08-16 RX ORDER — DULAGLUTIDE 1.5 MG/.5ML
INJECTION, SOLUTION SUBCUTANEOUS
Qty: 4 ML | Refills: 3 | Status: SHIPPED | OUTPATIENT
Start: 2020-08-16 | End: 2020-12-10 | Stop reason: SDUPTHER

## 2020-08-28 NOTE — PROGRESS NOTES
[facility]: Clinician Follow-Up    Called patient for follow up. He is doing well with no complaints. Patient says that for the most part his diet has remained the same. He mostly eats fish, lean meats, vegetables. Occasionally, he'll eat fast food.     The history is provided by the patient.      Review of patient's allergies indicates:   -- Lisinopril -- Rash   -- Penicillins -- Swelling  Follow-up reason(s): routine follow up.     Hypertension    Patient readings are stable         Last 6 Patient Entered Readings                                          Most Recent A1c: 7.2% on 6/12/2020  (Goal: 7%)     Recent Readings 8/28/2020 8/14/2020 8/7/2020 8/5/2020 8/4/2020    Blood Glucose (mg/dL) 156 91 135 174 131             Depression Screening  Did not address depression screening.    Sleep Apnea Screening    Did not address sleep apnea screening.     Medication Affordability Screening  Did not address medication affordability screening.     Medication Adherence-Medication adherence was asssessed.  Patient continue taking medication as prescribed.            ASSESSMENT(S)  Patient's A1C goal is less than or equal to 7 per 2020 ADA guidelines. Patient's most recent A1C result is above goal. Lab Results     Component                Value               Date                     HGBA1C                   7.2 (H)             06/12/2020          .      Due for A1c 09/2020    Diabetes Plan  Labs ordered. Patient prefers to get A1c done on 8/31 when he goes in for PCP appointment Expected Lab Date: 8/31/2020  Continue current therapy.  Continue current diet/physical activity routine.       Addressed any questions or concerns and patient has my contact information if needed prior to next outreach. Patient verbalizes understanding.                Topic    Eye Exam     Lipid (Cholesterol) Test     Urine Protein Check            Diabetes Medications     metFORMIN (GLUCOPHAGE-XR) 500 MG XR 24hr tablet TAKE 2 TABLETS(1000  MG) BY MOUTH TWICE DAILY WITH MEALS    TRULICITY 1.5 mg/0.5 mL pen injector ADMINISTER 0.5 ML UNDER THE SKIN EVERY 7 DAYS

## 2020-09-19 DIAGNOSIS — E11.69 HYPERLIPIDEMIA ASSOCIATED WITH TYPE 2 DIABETES MELLITUS: Chronic | ICD-10-CM

## 2020-09-19 DIAGNOSIS — E78.5 HYPERLIPIDEMIA ASSOCIATED WITH TYPE 2 DIABETES MELLITUS: Chronic | ICD-10-CM

## 2020-09-20 RX ORDER — ATORVASTATIN CALCIUM 10 MG/1
TABLET, FILM COATED ORAL
Qty: 90 TABLET | Refills: 0 | Status: SHIPPED | OUTPATIENT
Start: 2020-09-20 | End: 2020-12-31 | Stop reason: SDUPTHER

## 2020-10-16 ENCOUNTER — PATIENT MESSAGE (OUTPATIENT)
Dept: FAMILY MEDICINE | Facility: CLINIC | Age: 50
End: 2020-10-16

## 2020-10-16 DIAGNOSIS — E11.9 TYPE 2 DIABETES MELLITUS WITHOUT COMPLICATION, WITHOUT LONG-TERM CURRENT USE OF INSULIN: Chronic | ICD-10-CM

## 2020-10-16 RX ORDER — METFORMIN HYDROCHLORIDE 500 MG/1
TABLET, EXTENDED RELEASE ORAL
Qty: 360 TABLET | Refills: 0 | Status: SHIPPED | OUTPATIENT
Start: 2020-10-16 | End: 2020-12-31 | Stop reason: SDUPTHER

## 2020-12-10 DIAGNOSIS — E11.9 TYPE 2 DIABETES MELLITUS WITHOUT COMPLICATION, WITHOUT LONG-TERM CURRENT USE OF INSULIN: Chronic | ICD-10-CM

## 2020-12-10 RX ORDER — DULAGLUTIDE 1.5 MG/.5ML
1.5 INJECTION, SOLUTION SUBCUTANEOUS WEEKLY
Qty: 4 ML | Refills: 3 | Status: SHIPPED | OUTPATIENT
Start: 2020-12-10 | End: 2020-12-31 | Stop reason: SDUPTHER

## 2020-12-12 ENCOUNTER — LAB VISIT (OUTPATIENT)
Dept: LAB | Facility: HOSPITAL | Age: 50
End: 2020-12-12
Attending: INTERNAL MEDICINE
Payer: COMMERCIAL

## 2020-12-12 DIAGNOSIS — E11.9 TYPE 2 DIABETES MELLITUS WITHOUT COMPLICATION, WITHOUT LONG-TERM CURRENT USE OF INSULIN: Chronic | ICD-10-CM

## 2020-12-12 LAB
ESTIMATED AVG GLUCOSE: 169 MG/DL (ref 68–131)
HBA1C MFR BLD HPLC: 7.5 % (ref 4–5.6)

## 2020-12-12 PROCEDURE — 83036 HEMOGLOBIN GLYCOSYLATED A1C: CPT

## 2020-12-12 PROCEDURE — 36415 COLL VENOUS BLD VENIPUNCTURE: CPT | Mod: PO

## 2020-12-15 ENCOUNTER — PATIENT OUTREACH (OUTPATIENT)
Dept: OTHER | Facility: OTHER | Age: 50
End: 2020-12-15

## 2020-12-31 ENCOUNTER — OFFICE VISIT (OUTPATIENT)
Dept: FAMILY MEDICINE | Facility: CLINIC | Age: 50
End: 2020-12-31
Payer: COMMERCIAL

## 2020-12-31 VITALS
HEIGHT: 71 IN | BODY MASS INDEX: 30.85 KG/M2 | HEART RATE: 101 BPM | SYSTOLIC BLOOD PRESSURE: 138 MMHG | OXYGEN SATURATION: 97 % | WEIGHT: 220.38 LBS | DIASTOLIC BLOOD PRESSURE: 80 MMHG | TEMPERATURE: 98 F

## 2020-12-31 DIAGNOSIS — E11.9 TYPE 2 DIABETES MELLITUS WITHOUT COMPLICATION, WITHOUT LONG-TERM CURRENT USE OF INSULIN: Primary | Chronic | ICD-10-CM

## 2020-12-31 DIAGNOSIS — Z23 FLU VACCINE NEED: ICD-10-CM

## 2020-12-31 DIAGNOSIS — E78.5 HYPERLIPIDEMIA ASSOCIATED WITH TYPE 2 DIABETES MELLITUS: Chronic | ICD-10-CM

## 2020-12-31 DIAGNOSIS — Z11.4 SCREENING FOR HIV (HUMAN IMMUNODEFICIENCY VIRUS): ICD-10-CM

## 2020-12-31 DIAGNOSIS — Z11.59 NEED FOR HEPATITIS C SCREENING TEST: ICD-10-CM

## 2020-12-31 DIAGNOSIS — Z00.00 ROUTINE MEDICAL EXAM: ICD-10-CM

## 2020-12-31 DIAGNOSIS — E11.69 HYPERLIPIDEMIA ASSOCIATED WITH TYPE 2 DIABETES MELLITUS: Chronic | ICD-10-CM

## 2020-12-31 LAB
LEFT EYE DM RETINOPATHY: NEGATIVE
RIGHT EYE DM RETINOPATHY: NEGATIVE

## 2020-12-31 PROCEDURE — 99999 PR PBB SHADOW E&M-EST. PATIENT-LVL III: ICD-10-PCS | Mod: PBBFAC,,, | Performed by: INTERNAL MEDICINE

## 2020-12-31 PROCEDURE — 3051F PR MOST RECENT HEMOGLOBIN A1C LEVEL 7.0 - < 8.0%: ICD-10-PCS | Mod: CPTII,S$GLB,, | Performed by: INTERNAL MEDICINE

## 2020-12-31 PROCEDURE — 1126F AMNT PAIN NOTED NONE PRSNT: CPT | Mod: S$GLB,,, | Performed by: INTERNAL MEDICINE

## 2020-12-31 PROCEDURE — 3079F DIAST BP 80-89 MM HG: CPT | Mod: CPTII,S$GLB,, | Performed by: INTERNAL MEDICINE

## 2020-12-31 PROCEDURE — 3008F PR BODY MASS INDEX (BMI) DOCUMENTED: ICD-10-PCS | Mod: CPTII,S$GLB,, | Performed by: INTERNAL MEDICINE

## 2020-12-31 PROCEDURE — 1126F PR PAIN SEVERITY QUANTIFIED, NO PAIN PRESENT: ICD-10-PCS | Mod: S$GLB,,, | Performed by: INTERNAL MEDICINE

## 2020-12-31 PROCEDURE — 99999 PR PBB SHADOW E&M-EST. PATIENT-LVL III: CPT | Mod: PBBFAC,,, | Performed by: INTERNAL MEDICINE

## 2020-12-31 PROCEDURE — 3008F BODY MASS INDEX DOCD: CPT | Mod: CPTII,S$GLB,, | Performed by: INTERNAL MEDICINE

## 2020-12-31 PROCEDURE — 90471 FLU VACCINE (QUAD) GREATER THAN OR EQUAL TO 3YO PRESERVATIVE FREE IM: ICD-10-PCS | Mod: S$GLB,,, | Performed by: INTERNAL MEDICINE

## 2020-12-31 PROCEDURE — 3079F PR MOST RECENT DIASTOLIC BLOOD PRESSURE 80-89 MM HG: ICD-10-PCS | Mod: CPTII,S$GLB,, | Performed by: INTERNAL MEDICINE

## 2020-12-31 PROCEDURE — 90471 IMMUNIZATION ADMIN: CPT | Mod: S$GLB,,, | Performed by: INTERNAL MEDICINE

## 2020-12-31 PROCEDURE — 3075F PR MOST RECENT SYSTOLIC BLOOD PRESS GE 130-139MM HG: ICD-10-PCS | Mod: CPTII,S$GLB,, | Performed by: INTERNAL MEDICINE

## 2020-12-31 PROCEDURE — 3051F HG A1C>EQUAL 7.0%<8.0%: CPT | Mod: CPTII,S$GLB,, | Performed by: INTERNAL MEDICINE

## 2020-12-31 PROCEDURE — 99214 OFFICE O/P EST MOD 30 MIN: CPT | Mod: 25,S$GLB,, | Performed by: INTERNAL MEDICINE

## 2020-12-31 PROCEDURE — 90686 IIV4 VACC NO PRSV 0.5 ML IM: CPT | Mod: S$GLB,,, | Performed by: INTERNAL MEDICINE

## 2020-12-31 PROCEDURE — 99214 PR OFFICE/OUTPT VISIT, EST, LEVL IV, 30-39 MIN: ICD-10-PCS | Mod: 25,S$GLB,, | Performed by: INTERNAL MEDICINE

## 2020-12-31 PROCEDURE — 90686 FLU VACCINE (QUAD) GREATER THAN OR EQUAL TO 3YO PRESERVATIVE FREE IM: ICD-10-PCS | Mod: S$GLB,,, | Performed by: INTERNAL MEDICINE

## 2020-12-31 PROCEDURE — 3075F SYST BP GE 130 - 139MM HG: CPT | Mod: CPTII,S$GLB,, | Performed by: INTERNAL MEDICINE

## 2020-12-31 RX ORDER — ATORVASTATIN CALCIUM 10 MG/1
10 TABLET, FILM COATED ORAL DAILY
Qty: 90 TABLET | Refills: 1 | Status: SHIPPED | OUTPATIENT
Start: 2020-12-31 | End: 2022-03-24

## 2020-12-31 RX ORDER — DULAGLUTIDE 1.5 MG/.5ML
1.5 INJECTION, SOLUTION SUBCUTANEOUS WEEKLY
Qty: 4 ML | Refills: 3 | Status: SHIPPED | OUTPATIENT
Start: 2020-12-31 | End: 2021-04-20 | Stop reason: DRUGHIGH

## 2020-12-31 RX ORDER — GLIPIZIDE 5 MG/1
5 TABLET ORAL
Qty: 90 TABLET | Refills: 1 | Status: SHIPPED | OUTPATIENT
Start: 2020-12-31 | End: 2021-04-20

## 2020-12-31 RX ORDER — METFORMIN HYDROCHLORIDE 500 MG/1
1000 TABLET, EXTENDED RELEASE ORAL 2 TIMES DAILY WITH MEALS
Qty: 360 TABLET | Refills: 1 | Status: SHIPPED | OUTPATIENT
Start: 2020-12-31 | End: 2022-06-29 | Stop reason: SDUPTHER

## 2020-12-31 NOTE — PROGRESS NOTES
Assessment & Plan  Problem List Items Addressed This Visit        Cardiac/Vascular    Hyperlipidemia associated with type 2 diabetes mellitus (Chronic)    Current Assessment & Plan     The current medical regimen is effective;  continue present plan and medications.          Relevant Medications    atorvastatin (LIPITOR) 10 MG tablet    metFORMIN (GLUCOPHAGE-XR) 500 MG ER 24hr tablet    dulaglutide (TRULICITY) 1.5 mg/0.5 mL pen injector    glipiZIDE (GLUCOTROL) 5 MG tablet    Other Relevant Orders    Lipid Panel       Endocrine    Type 2 diabetes mellitus without complication, without long-term current use of insulin - Primary (Chronic)    Current Assessment & Plan     Will add low dose glipizide in the AM.  Keep metformin and trulicity the same.  Doing great with workout routine.  He is planning to get back into meal prep as well.  Check labs in 3 months.          Relevant Medications    metFORMIN (GLUCOPHAGE-XR) 500 MG ER 24hr tablet    dulaglutide (TRULICITY) 1.5 mg/0.5 mL pen injector    glipiZIDE (GLUCOTROL) 5 MG tablet    Other Relevant Orders    Hemoglobin A1C    Comprehensive Metabolic Panel    Microalbumin/Creatinine Ratio, Urine    CBC Auto Differential      Other Visit Diagnoses     Flu vaccine need    -  vaccinate    Routine medical exam    -  Future labs only.  Not addressed today    Relevant Orders    Hemoglobin A1C    Comprehensive Metabolic Panel    CBC Auto Differential    Lipid Panel    PSA, Screening    Screening for HIV (human immunodeficiency virus)    -  Screen per routine    Relevant Orders    HIV 1/2 Ag/Ab (4th Gen)    Need for hepatitis C screening test    -  Screen per routine    Relevant Orders    Hepatitis C Antibody            Health Maintenance reviewed, has eye exam today.  Foot exam at follow up.    Follow-up: Follow up in about 3 months (around 3/31/2021) for Routine Physical.    ______________________________________________________________________    Chief Complaint  Chief  Complaint   Patient presents with    Diabetes    Follow-up       HPI  Christophe Carroll is a 50 y.o. male with multiple medical diagnoses as listed in the medical history and problem list that presents for DM follow up.  Pt is known to me with his last appointment 06/2020.  His A1c has increased again.  Now 7.5% <-- 7.2% <-- 7.1%    He was eating better but wasn't working out.  Started working out and his eating habits slacked back off.  Taking and tolerating medications as prescribed without perceived side effects.  No CP, SOB, palpitations, hypoglycemic symptoms.        PAST MEDICAL HISTORY:  Past Medical History:   Diagnosis Date    Diabetes mellitus type II     Hypercholesteremia        PAST SURGICAL HISTORY:  Past Surgical History:   Procedure Laterality Date    removal of epididymal cyst         SOCIAL HISTORY:  Social History     Socioeconomic History    Marital status:      Spouse name: Not on file    Number of children: Not on file    Years of education: Not on file    Highest education level: Not on file   Occupational History     Employer: [employer]   Social Needs    Financial resource strain: Not hard at all    Food insecurity     Worry: Never true     Inability: Never true    Transportation needs     Medical: No     Non-medical: No   Tobacco Use    Smoking status: Never Smoker    Smokeless tobacco: Never Used   Substance and Sexual Activity    Alcohol use: Yes     Alcohol/week: 0.0 standard drinks     Frequency: Monthly or less     Drinks per session: 1 or 2     Binge frequency: Never     Comment: occasional    Drug use: No    Sexual activity: Yes     Partners: Female     Comment:    Lifestyle    Physical activity     Days per week: 0 days     Minutes per session: Not on file    Stress: Not at all   Relationships    Social connections     Talks on phone: More than three times a week     Gets together: Once a week     Attends Caodaism service: Not on file      Active member of club or organization: No     Attends meetings of clubs or organizations: Never     Relationship status:    Other Topics Concern    Not on file   Social History Narrative    Not on file       FAMILY HISTORY:  Family History   Problem Relation Age of Onset    Diabetes Mother     Cancer Mother         lung with mets to brain    Diabetes Maternal Aunt     Kidney disease Maternal Aunt     Diabetes Maternal Grandmother        ALLERGIES AND MEDICATIONS: updated and reviewed.  Review of patient's allergies indicates:   Allergen Reactions    Lisinopril Rash    Penicillins Swelling     Current Outpatient Medications   Medication Sig Dispense Refill    atorvastatin (LIPITOR) 10 MG tablet Take 1 tablet (10 mg total) by mouth once daily. 90 tablet 1    dulaglutide (TRULICITY) 1.5 mg/0.5 mL pen injector Inject 1.5 mg into the skin once a week. 4 mL 3    metFORMIN (GLUCOPHAGE-XR) 500 MG ER 24hr tablet Take 2 tablets (1,000 mg total) by mouth 2 (two) times daily with meals. 360 tablet 1    TRAVATAN Z 0.004 % Drop   6    aspirin 81 MG Chew Take 1 tablet (81 mg total) by mouth once daily. 30 tablet 0    diclofenac sodium (VOLTAREN) 1 % Gel Apply 2 g topically 4 (four) times daily. Apply to painful area for 10 days 1 Tube 0    glipiZIDE (GLUCOTROL) 5 MG tablet Take 1 tablet (5 mg total) by mouth daily with breakfast. 90 tablet 1    sildenafil citrate (VIAGRA ORAL) CHEW 1 TO 2 TABLETS BY MOUTH 30 TO 45 MINUTES BEFORE SEXUAL INTERCOURSE       No current facility-administered medications for this visit.          ROS  Review of Systems   Constitutional: Negative for chills, fever and unexpected weight change.   HENT: Negative for congestion, dental problem, ear pain, hearing loss, rhinorrhea, sore throat and trouble swallowing.    Eyes: Negative for discharge, redness and visual disturbance.   Respiratory: Negative for cough, chest tightness, shortness of breath and wheezing.    Cardiovascular:  "Negative for chest pain, palpitations and leg swelling.   Gastrointestinal: Negative for abdominal pain, constipation, diarrhea, nausea and vomiting.   Endocrine: Negative for polydipsia, polyphagia and polyuria.   Genitourinary: Negative for decreased urine volume, dysuria and hematuria.   Musculoskeletal: Negative for arthralgias and myalgias.   Skin: Negative for color change and rash.   Neurological: Negative for dizziness, weakness, light-headedness and headaches.   Psychiatric/Behavioral: Negative for decreased concentration, dysphoric mood, sleep disturbance and suicidal ideas.           Physical Exam  Vitals:    12/31/20 0750   BP: 138/80   Pulse: 101   Temp: 97.9 °F (36.6 °C)   SpO2: 97%   Weight: 100 kg (220 lb 5.6 oz)   Height: 5' 11" (1.803 m)    Body mass index is 30.73 kg/m².  Weight: 100 kg (220 lb 5.6 oz)   Height: 5' 11" (180.3 cm)   Physical Exam  Constitutional:       General: He is not in acute distress.     Appearance: He is well-developed.   HENT:      Head: Normocephalic and atraumatic.   Eyes:      General: Lids are normal. No scleral icterus.     Conjunctiva/sclera: Conjunctivae normal.      Pupils: Pupils are equal, round, and reactive to light.   Neck:      Musculoskeletal: Full passive range of motion without pain and neck supple.      Thyroid: No thyromegaly.      Vascular: No carotid bruit or JVD.   Cardiovascular:      Rate and Rhythm: Normal rate and regular rhythm.      Heart sounds: Normal heart sounds. No murmur. No friction rub. No S3 or S4 sounds.    Pulmonary:      Effort: Pulmonary effort is normal.      Breath sounds: Normal breath sounds. No wheezing, rhonchi or rales.   Musculoskeletal:         General: No tenderness.      Right lower leg: No edema.      Left lower leg: No edema.   Skin:     General: Skin is warm and dry.      Findings: No rash.   Neurological:      Mental Status: He is alert and oriented to person, place, and time.   Psychiatric:         Speech: Speech " normal.         Behavior: Behavior normal.         Thought Content: Thought content normal.           Health Maintenance       Date Due Completion Date    Hepatitis C Screening 1970 ---    HIV Screening 09/28/1986 ---    Influenza Vaccine (1) 08/01/2020 10/9/2019    Eye Exam 08/07/2020 8/7/2019    Lipid Panel 08/09/2020 8/9/2019    Foot Exam 08/13/2020 8/13/2019    Urine Microalbumin 10/08/2020 10/8/2019    Hemoglobin A1c 03/12/2021 12/12/2020    Low Dose Statin 09/20/2021 9/20/2020    TETANUS VACCINE 08/13/2029 8/13/2019

## 2020-12-31 NOTE — ASSESSMENT & PLAN NOTE
Will add low dose glipizide in the AM.  Keep metformin and trulicity the same.  Doing great with workout routine.  He is planning to get back into meal prep as well.  Check labs in 3 months.

## 2021-01-06 NOTE — DISCHARGE INSTRUCTIONS
CT shows: Worsening moderate to severe degenerative changes of the C4-5 and C5-6 and C6-7 level   Chest Pain (Angina)   Associated symptoms include shortness of breath. Shortness of Breath  Associated symptoms include chest pain. Patient near continuous left-sided chest pain and shortness of breath for the past 48 hours. Ordered spontaneously and patient reports it is worsened by laying flat and with exertion. She denies radiation, or associated symptoms such as diaphoresis, nausea, vomiting. She has no known history of coronary artery disease and has not experienced pain like this before. She is pain-free in the ER. No cough, sputum production, constitutional symptoms.     Past Medical History:   Diagnosis Date    Anxiety 6/22/2020    Bipolar 1 disorder ([facility] 1/23/2018    Diabetes mellitus type 2, controlled ([facility] 6/22/2020    Hypertension 6/22/2020    Liver fibrosis 6/22/2020    Major depression 6/22/2020    Migraine 6/22/2020    Sleep apnea 1/23/2018    Suicide attempt ([facility] 6/22/2020 2013       Past Surgical History:   Procedure Laterality Date    HX APPENDECTOMY      HX CHOLECYSTECTOMY           Family History:   Problem Relation Age of Onset    Heart Attack Mother     Heart Attack Father        Social History     Socioeconomic History    Marital status: SINGLE     Spouse name: Not on file    Number of children: 0    Years of education: Not on file    Highest education level: Associate degree: academic program   Occupational History    Not on file   Social Needs    Financial resource strain: Not very hard    Food insecurity     Worry: Never true     Inability: Never true    Transportation needs     Medical: No     Non-medical: No   Tobacco Use    Smoking status: Never Smoker    Smokeless tobacco: Never Used   Substance and Sexual Activity    Alcohol use: Not Currently    Drug use: Never    Sexual activity: Not Currently   Lifestyle    Physical activity     Days per week: Not on file     Minutes per session: Not on file    Stress: Not on file   Relationships    Social connections     Talks on phone: Not on file     Gets together: Not on file     Attends Anglican service: Not on file     Active member of club or organization: Not on file     Attends meetings of clubs or organizations: Not on file     Relationship status: Not on file    Intimate partner violence     Fear of current or ex partner: Not on file     Emotionally abused: Not on file     Physically abused: Not on file     Forced sexual activity: Not on file   Other Topics Concern     Service Not Asked    Blood Transfusions Not Asked    Caffeine Concern Not Asked    Occupational Exposure Not Asked   Jillian Avelar Hazards Not Asked    Sleep Concern Not Asked    Stress Concern Not Asked    Weight Concern Not Asked    Special Diet Not Asked    Back Care Not Asked    Exercise Not Asked    Bike Helmet Not Asked   [address] Not Asked    Self-Exams Not Asked   Social History Narrative    Not on file         ALLERGIES: Adhesive tape-silicones, Septra [sulfamethoprim], and Sulfa (sulfonamide antibiotics)    Review of Systems   Constitutional: Negative. Eyes: Negative. Respiratory: Positive for shortness of breath. Cardiovascular: Positive for chest pain. Gastrointestinal: Negative. Endocrine: Negative. Genitourinary: Negative. Musculoskeletal: Negative. Allergic/Immunologic: Negative. Neurological: Negative. Hematological: Negative. Psychiatric/Behavioral: Negative. All other systems reviewed and are negative. Vitals:    01/06/21 1602   BP: (!) 184/91   Pulse: 85   Resp: 20   Temp: 98.6 °F (37 °C)   SpO2: 97%   Weight: 98.4 kg (217 lb)   Height: 4' 11\" (1.499 m)            Physical Exam  Vitals signs and nursing note reviewed. Constitutional:       General: She is not in acute distress. Appearance: She is well-developed. She is obese. She is not ill-appearing, toxic-appearing or diaphoretic. HENT:      Head: Normocephalic.       Nose: Nose normal. Mouth/Throat:      Mouth: Mucous membranes are moist.   Eyes:      Extraocular Movements: Extraocular movements intact. Pupils: Pupils are equal, round, and reactive to light. Neck:      Musculoskeletal: Normal range of motion. Cardiovascular:      Rate and Rhythm: Normal rate and regular rhythm. Heart sounds: Normal heart sounds. No murmur. No gallop. Pulmonary:      Effort: Pulmonary effort is normal. No respiratory distress. Breath sounds: No stridor. No wheezing, rhonchi or rales. Abdominal:      General: Abdomen is flat. Musculoskeletal: Normal range of motion. General: No swelling or tenderness. Skin:     General: Skin is warm and dry. Neurological:      General: No focal deficit present. Mental Status: She is alert and oriented to person, place, and time. Mental status is at baseline. Cranial Nerves: No cranial nerve deficit. Sensory: No sensory deficit. Motor: No weakness. Coordination: Coordination normal.   Psychiatric:         Mood and Affect: Mood normal.         Behavior: Behavior normal.         Thought Content: Thought content normal.          MDM     Patient has a low HEART score, given that symptoms have been nearly continuous for 48 hours, we will rule out acute coronary syndrome in the ER with cardiac biomarkers. Similarly, I do not suspect PE, Covid, or other identifiable process in the ER.     Procedures

## 2021-03-19 ENCOUNTER — PATIENT OUTREACH (OUTPATIENT)
Dept: ADMINISTRATIVE | Facility: HOSPITAL | Age: 51
End: 2021-03-19

## 2021-03-25 ENCOUNTER — PATIENT OUTREACH (OUTPATIENT)
Dept: ADMINISTRATIVE | Facility: HOSPITAL | Age: 51
End: 2021-03-25

## 2021-03-29 ENCOUNTER — LAB VISIT (OUTPATIENT)
Dept: LAB | Facility: HOSPITAL | Age: 51
End: 2021-03-29
Attending: INTERNAL MEDICINE
Payer: COMMERCIAL

## 2021-03-29 DIAGNOSIS — Z11.4 SCREENING FOR HIV (HUMAN IMMUNODEFICIENCY VIRUS): ICD-10-CM

## 2021-03-29 DIAGNOSIS — Z00.00 ROUTINE MEDICAL EXAM: ICD-10-CM

## 2021-03-29 DIAGNOSIS — Z11.59 NEED FOR HEPATITIS C SCREENING TEST: ICD-10-CM

## 2021-03-29 DIAGNOSIS — E11.9 TYPE 2 DIABETES MELLITUS WITHOUT COMPLICATION, WITHOUT LONG-TERM CURRENT USE OF INSULIN: Chronic | ICD-10-CM

## 2021-03-29 DIAGNOSIS — E78.5 HYPERLIPIDEMIA ASSOCIATED WITH TYPE 2 DIABETES MELLITUS: Chronic | ICD-10-CM

## 2021-03-29 DIAGNOSIS — E11.69 HYPERLIPIDEMIA ASSOCIATED WITH TYPE 2 DIABETES MELLITUS: Chronic | ICD-10-CM

## 2021-03-29 LAB
ALBUMIN SERPL BCP-MCNC: 4.4 G/DL (ref 3.5–5.2)
ALP SERPL-CCNC: 102 U/L (ref 55–135)
ALT SERPL W/O P-5'-P-CCNC: 33 U/L (ref 10–44)
ANION GAP SERPL CALC-SCNC: 10 MMOL/L (ref 8–16)
AST SERPL-CCNC: 24 U/L (ref 10–40)
BASOPHILS # BLD AUTO: 0.04 K/UL (ref 0–0.2)
BASOPHILS NFR BLD: 0.6 % (ref 0–1.9)
BILIRUB SERPL-MCNC: 0.4 MG/DL (ref 0.1–1)
BUN SERPL-MCNC: 18 MG/DL (ref 6–20)
CALCIUM SERPL-MCNC: 10.1 MG/DL (ref 8.7–10.5)
CHLORIDE SERPL-SCNC: 101 MMOL/L (ref 95–110)
CHOLEST SERPL-MCNC: 152 MG/DL (ref 120–199)
CHOLEST/HDLC SERPL: 5.6 {RATIO} (ref 2–5)
CO2 SERPL-SCNC: 25 MMOL/L (ref 23–29)
COMPLEXED PSA SERPL-MCNC: 0.54 NG/ML (ref 0–4)
CREAT SERPL-MCNC: 1.2 MG/DL (ref 0.5–1.4)
DIFFERENTIAL METHOD: ABNORMAL
EOSINOPHIL # BLD AUTO: 0.3 K/UL (ref 0–0.5)
EOSINOPHIL NFR BLD: 3.9 % (ref 0–8)
ERYTHROCYTE [DISTWIDTH] IN BLOOD BY AUTOMATED COUNT: 12.7 % (ref 11.5–14.5)
EST. GFR  (AFRICAN AMERICAN): >60 ML/MIN/1.73 M^2
EST. GFR  (NON AFRICAN AMERICAN): >60 ML/MIN/1.73 M^2
ESTIMATED AVG GLUCOSE: 160 MG/DL (ref 68–131)
GLUCOSE SERPL-MCNC: 163 MG/DL (ref 70–110)
HBA1C MFR BLD: 7.2 % (ref 4–5.6)
HCT VFR BLD AUTO: 47.2 % (ref 40–54)
HDLC SERPL-MCNC: 27 MG/DL (ref 40–75)
HDLC SERPL: 17.8 % (ref 20–50)
HGB BLD-MCNC: 14.9 G/DL (ref 14–18)
IMM GRANULOCYTES # BLD AUTO: 0.01 K/UL (ref 0–0.04)
IMM GRANULOCYTES NFR BLD AUTO: 0.1 % (ref 0–0.5)
LDLC SERPL CALC-MCNC: ABNORMAL MG/DL (ref 63–159)
LYMPHOCYTES # BLD AUTO: 3 K/UL (ref 1–4.8)
LYMPHOCYTES NFR BLD: 40.7 % (ref 18–48)
MCH RBC QN AUTO: 28 PG (ref 27–31)
MCHC RBC AUTO-ENTMCNC: 31.6 G/DL (ref 32–36)
MCV RBC AUTO: 89 FL (ref 82–98)
MONOCYTES # BLD AUTO: 0.7 K/UL (ref 0.3–1)
MONOCYTES NFR BLD: 9.2 % (ref 4–15)
NEUTROPHILS # BLD AUTO: 3.3 K/UL (ref 1.8–7.7)
NEUTROPHILS NFR BLD: 45.5 % (ref 38–73)
NONHDLC SERPL-MCNC: 125 MG/DL
NRBC BLD-RTO: 0 /100 WBC
PLATELET # BLD AUTO: 286 K/UL (ref 150–450)
PMV BLD AUTO: 10.1 FL (ref 9.2–12.9)
POTASSIUM SERPL-SCNC: 4.3 MMOL/L (ref 3.5–5.1)
PROT SERPL-MCNC: 7.8 G/DL (ref 6–8.4)
RBC # BLD AUTO: 5.33 M/UL (ref 4.6–6.2)
SODIUM SERPL-SCNC: 136 MMOL/L (ref 136–145)
TRIGL SERPL-MCNC: 506 MG/DL (ref 30–150)
WBC # BLD AUTO: 7.27 K/UL (ref 3.9–12.7)

## 2021-03-29 PROCEDURE — 86803 HEPATITIS C AB TEST: CPT | Performed by: INTERNAL MEDICINE

## 2021-03-29 PROCEDURE — 80053 COMPREHEN METABOLIC PANEL: CPT | Performed by: INTERNAL MEDICINE

## 2021-03-29 PROCEDURE — 86703 HIV-1/HIV-2 1 RESULT ANTBDY: CPT | Performed by: INTERNAL MEDICINE

## 2021-03-29 PROCEDURE — 84153 ASSAY OF PSA TOTAL: CPT | Performed by: INTERNAL MEDICINE

## 2021-03-29 PROCEDURE — 80061 LIPID PANEL: CPT | Performed by: INTERNAL MEDICINE

## 2021-03-29 PROCEDURE — 36415 COLL VENOUS BLD VENIPUNCTURE: CPT | Mod: PO | Performed by: INTERNAL MEDICINE

## 2021-03-29 PROCEDURE — 85025 COMPLETE CBC W/AUTO DIFF WBC: CPT | Performed by: INTERNAL MEDICINE

## 2021-03-29 PROCEDURE — 83036 HEMOGLOBIN GLYCOSYLATED A1C: CPT | Performed by: INTERNAL MEDICINE

## 2021-03-30 LAB
HCV AB SERPL QL IA: NEGATIVE
HIV 1+2 AB+HIV1 P24 AG SERPL QL IA: NEGATIVE

## 2021-04-01 ENCOUNTER — OFFICE VISIT (OUTPATIENT)
Dept: FAMILY MEDICINE | Facility: CLINIC | Age: 51
End: 2021-04-01
Payer: COMMERCIAL

## 2021-04-01 VITALS
WEIGHT: 219.13 LBS | BODY MASS INDEX: 30.68 KG/M2 | SYSTOLIC BLOOD PRESSURE: 108 MMHG | DIASTOLIC BLOOD PRESSURE: 74 MMHG | HEART RATE: 102 BPM | OXYGEN SATURATION: 97 % | HEIGHT: 71 IN | TEMPERATURE: 98 F

## 2021-04-01 DIAGNOSIS — E11.69 HYPERLIPIDEMIA ASSOCIATED WITH TYPE 2 DIABETES MELLITUS: ICD-10-CM

## 2021-04-01 DIAGNOSIS — E78.5 HYPERLIPIDEMIA ASSOCIATED WITH TYPE 2 DIABETES MELLITUS: ICD-10-CM

## 2021-04-01 DIAGNOSIS — E11.9 TYPE 2 DIABETES MELLITUS WITHOUT COMPLICATION, WITHOUT LONG-TERM CURRENT USE OF INSULIN: ICD-10-CM

## 2021-04-01 DIAGNOSIS — Z00.00 ROUTINE MEDICAL EXAM: Primary | ICD-10-CM

## 2021-04-01 DIAGNOSIS — E66.9 OBESITY, CLASS I, BMI 30-34.9: ICD-10-CM

## 2021-04-01 PROCEDURE — 99999 PR PBB SHADOW E&M-EST. PATIENT-LVL III: ICD-10-PCS | Mod: PBBFAC,,, | Performed by: INTERNAL MEDICINE

## 2021-04-01 PROCEDURE — 3008F BODY MASS INDEX DOCD: CPT | Mod: CPTII,S$GLB,, | Performed by: INTERNAL MEDICINE

## 2021-04-01 PROCEDURE — 1126F AMNT PAIN NOTED NONE PRSNT: CPT | Mod: S$GLB,,, | Performed by: INTERNAL MEDICINE

## 2021-04-01 PROCEDURE — 3051F HG A1C>EQUAL 7.0%<8.0%: CPT | Mod: CPTII,S$GLB,, | Performed by: INTERNAL MEDICINE

## 2021-04-01 PROCEDURE — 3078F DIAST BP <80 MM HG: CPT | Mod: CPTII,S$GLB,, | Performed by: INTERNAL MEDICINE

## 2021-04-01 PROCEDURE — 3074F PR MOST RECENT SYSTOLIC BLOOD PRESSURE < 130 MM HG: ICD-10-PCS | Mod: CPTII,S$GLB,, | Performed by: INTERNAL MEDICINE

## 2021-04-01 PROCEDURE — 3051F PR MOST RECENT HEMOGLOBIN A1C LEVEL 7.0 - < 8.0%: ICD-10-PCS | Mod: CPTII,S$GLB,, | Performed by: INTERNAL MEDICINE

## 2021-04-01 PROCEDURE — 3008F PR BODY MASS INDEX (BMI) DOCUMENTED: ICD-10-PCS | Mod: CPTII,S$GLB,, | Performed by: INTERNAL MEDICINE

## 2021-04-01 PROCEDURE — 99999 PR PBB SHADOW E&M-EST. PATIENT-LVL III: CPT | Mod: PBBFAC,,, | Performed by: INTERNAL MEDICINE

## 2021-04-01 PROCEDURE — 1126F PR PAIN SEVERITY QUANTIFIED, NO PAIN PRESENT: ICD-10-PCS | Mod: S$GLB,,, | Performed by: INTERNAL MEDICINE

## 2021-04-01 PROCEDURE — 3078F PR MOST RECENT DIASTOLIC BLOOD PRESSURE < 80 MM HG: ICD-10-PCS | Mod: CPTII,S$GLB,, | Performed by: INTERNAL MEDICINE

## 2021-04-01 PROCEDURE — 3074F SYST BP LT 130 MM HG: CPT | Mod: CPTII,S$GLB,, | Performed by: INTERNAL MEDICINE

## 2021-04-01 PROCEDURE — 99396 PREV VISIT EST AGE 40-64: CPT | Mod: S$GLB,,, | Performed by: INTERNAL MEDICINE

## 2021-04-01 PROCEDURE — 99396 PR PREVENTIVE VISIT,EST,40-64: ICD-10-PCS | Mod: S$GLB,,, | Performed by: INTERNAL MEDICINE

## 2021-04-21 LAB
LEFT EYE DM RETINOPATHY: NEGATIVE
RIGHT EYE DM RETINOPATHY: NEGATIVE

## 2021-04-23 ENCOUNTER — PATIENT OUTREACH (OUTPATIENT)
Dept: ADMINISTRATIVE | Facility: HOSPITAL | Age: 51
End: 2021-04-23

## 2021-05-19 ENCOUNTER — PATIENT MESSAGE (OUTPATIENT)
Dept: FAMILY MEDICINE | Facility: CLINIC | Age: 51
End: 2021-05-19

## 2021-05-20 ENCOUNTER — PATIENT MESSAGE (OUTPATIENT)
Dept: FAMILY MEDICINE | Facility: CLINIC | Age: 51
End: 2021-05-20

## 2021-07-01 ENCOUNTER — LAB VISIT (OUTPATIENT)
Dept: LAB | Facility: HOSPITAL | Age: 51
End: 2021-07-01
Attending: INTERNAL MEDICINE
Payer: COMMERCIAL

## 2021-07-01 DIAGNOSIS — E11.9 TYPE 2 DIABETES MELLITUS WITHOUT COMPLICATION, WITHOUT LONG-TERM CURRENT USE OF INSULIN: ICD-10-CM

## 2021-07-01 LAB
ALBUMIN SERPL BCP-MCNC: 4.5 G/DL (ref 3.5–5.2)
ALP SERPL-CCNC: 84 U/L (ref 55–135)
ALT SERPL W/O P-5'-P-CCNC: 22 U/L (ref 10–44)
ANION GAP SERPL CALC-SCNC: 7 MMOL/L (ref 8–16)
AST SERPL-CCNC: 20 U/L (ref 10–40)
BILIRUB SERPL-MCNC: 0.6 MG/DL (ref 0.1–1)
BUN SERPL-MCNC: 9 MG/DL (ref 6–20)
CALCIUM SERPL-MCNC: 9.8 MG/DL (ref 8.7–10.5)
CHLORIDE SERPL-SCNC: 102 MMOL/L (ref 95–110)
CHOLEST SERPL-MCNC: 120 MG/DL (ref 120–199)
CHOLEST/HDLC SERPL: 3.8 {RATIO} (ref 2–5)
CO2 SERPL-SCNC: 29 MMOL/L (ref 23–29)
CREAT SERPL-MCNC: 1.1 MG/DL (ref 0.5–1.4)
EST. GFR  (AFRICAN AMERICAN): >60 ML/MIN/1.73 M^2
EST. GFR  (NON AFRICAN AMERICAN): >60 ML/MIN/1.73 M^2
ESTIMATED AVG GLUCOSE: 154 MG/DL (ref 68–131)
GLUCOSE SERPL-MCNC: 148 MG/DL (ref 70–110)
HBA1C MFR BLD: 7 % (ref 4–5.6)
HDLC SERPL-MCNC: 32 MG/DL (ref 40–75)
HDLC SERPL: 26.7 % (ref 20–50)
LDLC SERPL CALC-MCNC: 60.4 MG/DL (ref 63–159)
NONHDLC SERPL-MCNC: 88 MG/DL
POTASSIUM SERPL-SCNC: 4.9 MMOL/L (ref 3.5–5.1)
PROT SERPL-MCNC: 7.7 G/DL (ref 6–8.4)
SODIUM SERPL-SCNC: 138 MMOL/L (ref 136–145)
TRIGL SERPL-MCNC: 138 MG/DL (ref 30–150)

## 2021-07-01 PROCEDURE — 83036 HEMOGLOBIN GLYCOSYLATED A1C: CPT | Performed by: INTERNAL MEDICINE

## 2021-07-01 PROCEDURE — 80053 COMPREHEN METABOLIC PANEL: CPT | Performed by: INTERNAL MEDICINE

## 2021-07-01 PROCEDURE — 80061 LIPID PANEL: CPT | Performed by: INTERNAL MEDICINE

## 2021-07-01 PROCEDURE — 36415 COLL VENOUS BLD VENIPUNCTURE: CPT | Mod: PO | Performed by: INTERNAL MEDICINE

## 2021-10-12 NOTE — PROGRESS NOTES
Patient tolerated injection well.  Instructed to remain in lobby for 15 minutes and to report any adverse reactions right away.  Verbalized understanding.     Yes

## 2021-11-15 ENCOUNTER — PATIENT MESSAGE (OUTPATIENT)
Dept: FAMILY MEDICINE | Facility: CLINIC | Age: 51
End: 2021-11-15
Payer: COMMERCIAL

## 2021-12-24 ENCOUNTER — HOSPITAL ENCOUNTER (EMERGENCY)
Facility: HOSPITAL | Age: 51
Discharge: HOME | End: 2021-12-24
Attending: EMERGENCY MEDICINE
Payer: COMMERCIAL

## 2021-12-24 VITALS
HEART RATE: 89 BPM | HEIGHT: 71 IN | OXYGEN SATURATION: 99 % | RESPIRATION RATE: 18 BRPM | TEMPERATURE: 98 F | BODY MASS INDEX: 30.8 KG/M2 | DIASTOLIC BLOOD PRESSURE: 78 MMHG | WEIGHT: 220 LBS | SYSTOLIC BLOOD PRESSURE: 178 MMHG

## 2021-12-24 DIAGNOSIS — Z20.822 COVID-19 VIRUS NOT DETECTED: ICD-10-CM

## 2021-12-24 DIAGNOSIS — Z20.822 EXPOSURE TO COVID-19 VIRUS: Primary | ICD-10-CM

## 2021-12-24 LAB
CTP QC/QA: YES
SARS-COV-2 RDRP RESP QL NAA+PROBE: NEGATIVE

## 2021-12-24 PROCEDURE — U0002 COVID-19 LAB TEST NON-CDC: HCPCS | Performed by: EMERGENCY MEDICINE

## 2021-12-24 PROCEDURE — 99282 EMERGENCY DEPT VISIT SF MDM: CPT

## 2021-12-24 NOTE — ED PROVIDER NOTES
Encounter Date: 12/24/2021       History     Chief Complaint   Patient presents with    COVID-19 Concerns     Covid exposure, no symptoms     This is a 51-year-old male who presents to the ED with request for COVID-19 testing.  He reports recent exposure.  Patient states he has been vaccinated.  He has no symptoms or complaints at this time.    The history is provided by the patient.     Review of patient's allergies indicates:   Allergen Reactions    Lisinopril Rash    Penicillins Swelling     Past Medical History:   Diagnosis Date    Diabetes mellitus type II     Hypercholesteremia      Past Surgical History:   Procedure Laterality Date    removal of epididymal cyst       Family History   Problem Relation Age of Onset    Diabetes Mother     Cancer Mother         lung with mets to brain    Diabetes Maternal Aunt     Kidney disease Maternal Aunt     Diabetes Maternal Grandmother      Social History     Tobacco Use    Smoking status: Never Smoker    Smokeless tobacco: Never Used   Substance Use Topics    Alcohol use: Yes     Alcohol/week: 0.0 standard drinks     Comment: occasional    Drug use: No     Review of Systems   Constitutional: Negative for chills and fever.   HENT: Negative for congestion and sore throat.    Respiratory: Negative for cough and shortness of breath.    Cardiovascular: Negative for chest pain.   Gastrointestinal: Negative for abdominal pain, diarrhea, nausea and vomiting.   Genitourinary: Negative for dysuria.   Musculoskeletal: Negative for back pain.   Skin: Negative for rash.   Neurological: Negative for weakness and headaches.   Hematological: Does not bruise/bleed easily.       Physical Exam     Initial Vitals [12/24/21 1210]   BP Pulse Resp Temp SpO2   119/77 95 18 98.3 °F (36.8 °C) 98 %      MAP       --         Physical Exam    Constitutional: Vital signs are normal. He appears well-developed and well-nourished.  Non-toxic appearance.   Eyes: EOM are normal.   Neck: Neck  supple.    Full passive range of motion without pain.     Cardiovascular: Normal rate.   Pulmonary/Chest: Effort normal. No tachypnea.   Abdominal: Normal appearance. There is no CVA tenderness.   Musculoskeletal:      Cervical back: Full passive range of motion without pain and neck supple. No rigidity.     Neurological: He is alert and oriented to person, place, and time. GCS eye subscore is 4. GCS verbal subscore is 5. GCS motor subscore is 6.   Skin: Skin is warm, dry and intact. No rash noted.   Psychiatric: He has a normal mood and affect.         ED Course   Procedures  Labs Reviewed   SARS-COV-2 RDRP GENE    Narrative:     This test utilizes isothermal nucleic acid amplification   technology to detect the SARS-CoV-2 RdRp nucleic acid segment.   The analytical sensitivity (limit of detection) is 125 genome   equivalents/mL.   A POSITIVE result implies infection with the SARS-CoV-2 virus;   the patient is presumed to be contagious.     A NEGATIVE result means that SARS-CoV-2 nucleic acids are not   present above the limit of detection. A NEGATIVE result should be   treated as presumptive. It does not rule out the possibility of   COVID-19 and should not be the sole basis for treatment decisions.   If COVID-19 is strongly suspected based on clinical and exposure   history, re-testing using an alternate molecular assay should be   considered.   This test is only for use under the Food and Drug   Administration s Emergency Use Authorization (EUA).   Commercial kits are provided by [employer].   Performance characteristics of the EUA have been independently   verified by [facility] Department of   Pathology and Laboratory Medicine.   _________________________________________________________________   The authorized Fact Sheet for Healthcare Providers and the authorized Fact   Sheet for Patients of the ID NOW COVID-19 are available on the FDA   website:      https://www.fda.gov/media/707439/download  https://www.fda.gov/media/325665/download              Imaging Results    None          Medications - No data to display  Medical Decision Making:   ED Management:  COVID-19 negative.  Results given to patient.  Discharge with return precautions.                      Clinical Impression:   Final diagnoses:  [Z20.822] Exposure to COVID-19 virus (Primary)  [Z20.822] COVID-19 virus not detected          ED Disposition Condition    Discharge Stable        ED Prescriptions     None        Follow-up Information     Follow up With Specialties Details Why Contact Info    [facility] Emergency Dept Emergency Medicine  As needed [address]  [facility] [id number] [phone]           Martita Kimbrough NP  12/24/21 4963

## 2021-12-24 NOTE — DISCHARGE INSTRUCTIONS
Please return to the ED for any new or worsening symptoms: chest pain, shortness of breath, loss of consciousness or any other concerns. Please follow up with primary care within in the week. You may also call [phone] for the [facility] same day appointment line.

## 2021-12-28 ENCOUNTER — LAB VISIT (OUTPATIENT)
Dept: PRIMARY CARE CLINIC | Facility: OTHER | Age: 51
End: 2021-12-28
Attending: INTERNAL MEDICINE
Payer: COMMERCIAL

## 2021-12-28 DIAGNOSIS — Z20.822 EXPOSURE TO COVID-19 VIRUS: Primary | ICD-10-CM

## 2021-12-28 PROCEDURE — U0003 INFECTIOUS AGENT DETECTION BY NUCLEIC ACID (DNA OR RNA); SEVERE ACUTE RESPIRATORY SYNDROME CORONAVIRUS 2 (SARS-COV-2) (CORONAVIRUS DISEASE [COVID-19]), AMPLIFIED PROBE TECHNIQUE, MAKING USE OF HIGH THROUGHPUT TECHNOLOGIES AS DESCRIBED BY CMS-2020-01-R: HCPCS | Performed by: INTERNAL MEDICINE

## 2021-12-29 DIAGNOSIS — U07.1 COVID-19: Primary | ICD-10-CM

## 2021-12-29 DIAGNOSIS — U07.1 COVID-19 VIRUS DETECTED: ICD-10-CM

## 2021-12-29 LAB
SARS-COV-2 RNA RESP QL NAA+PROBE: DETECTED
SARS-COV-2- CYCLE NUMBER: 27

## 2021-12-30 ENCOUNTER — PATIENT MESSAGE (OUTPATIENT)
Dept: ADMINISTRATIVE | Facility: HOSPITAL | Age: 51
End: 2021-12-30
Payer: COMMERCIAL

## 2021-12-30 ENCOUNTER — INFUSION (OUTPATIENT)
Dept: INFECTIOUS DISEASES | Facility: HOSPITAL | Age: 51
End: 2021-12-30
Attending: EMERGENCY MEDICINE
Payer: COMMERCIAL

## 2021-12-30 VITALS
HEIGHT: 71 IN | WEIGHT: 220 LBS | RESPIRATION RATE: 16 BRPM | OXYGEN SATURATION: 96 % | TEMPERATURE: 99 F | SYSTOLIC BLOOD PRESSURE: 129 MMHG | DIASTOLIC BLOOD PRESSURE: 78 MMHG | HEART RATE: 96 BPM | BODY MASS INDEX: 30.8 KG/M2

## 2021-12-30 DIAGNOSIS — U07.1 COVID-19: Primary | ICD-10-CM

## 2021-12-30 PROCEDURE — 25000003 PHARM REV CODE 250: Performed by: INTERNAL MEDICINE

## 2021-12-30 PROCEDURE — 63600175 PHARM REV CODE 636 W HCPCS: Performed by: INTERNAL MEDICINE

## 2021-12-30 PROCEDURE — M0243 CASIRIVI AND IMDEVI INFUSION: HCPCS | Performed by: INTERNAL MEDICINE

## 2021-12-30 RX ORDER — ONDANSETRON 4 MG/1
4 TABLET, ORALLY DISINTEGRATING ORAL ONCE AS NEEDED
Status: DISCONTINUED | OUTPATIENT
Start: 2021-12-30 | End: 2023-06-05

## 2021-12-30 RX ORDER — EPINEPHRINE 0.3 MG/.3ML
0.3 INJECTION SUBCUTANEOUS
Status: ACTIVE | OUTPATIENT
Start: 2021-12-30

## 2021-12-30 RX ORDER — DIPHENHYDRAMINE HYDROCHLORIDE 50 MG/ML
25 INJECTION INTRAMUSCULAR; INTRAVENOUS ONCE AS NEEDED
Status: DISCONTINUED | OUTPATIENT
Start: 2021-12-30 | End: 2023-06-05

## 2021-12-30 RX ORDER — ALBUTEROL SULFATE 90 UG/1
2 AEROSOL, METERED RESPIRATORY (INHALATION)
Status: DISCONTINUED | OUTPATIENT
Start: 2021-12-30 | End: 2023-06-05

## 2021-12-30 RX ORDER — ACETAMINOPHEN 325 MG/1
650 TABLET ORAL ONCE AS NEEDED
Status: DISCONTINUED | OUTPATIENT
Start: 2021-12-30 | End: 2023-06-05

## 2021-12-30 RX ORDER — SODIUM CHLORIDE 0.9 % (FLUSH) 0.9 %
10 SYRINGE (ML) INJECTION
Status: DISCONTINUED | OUTPATIENT
Start: 2021-12-30 | End: 2023-06-05

## 2021-12-30 RX ADMIN — CASIRIVIMAB AND IMDEVIMAB 600 MG: 600; 600 INJECTION, SOLUTION, CONCENTRATE INTRAVENOUS at 09:12

## 2022-02-04 ENCOUNTER — LAB VISIT (OUTPATIENT)
Dept: LAB | Facility: HOSPITAL | Age: 52
End: 2022-02-04
Payer: COMMERCIAL

## 2022-02-04 DIAGNOSIS — E11.9 TYPE 2 DIABETES MELLITUS WITHOUT COMPLICATION, WITHOUT LONG-TERM CURRENT USE OF INSULIN: ICD-10-CM

## 2022-02-04 LAB
ESTIMATED AVG GLUCOSE: 177 MG/DL (ref 68–131)
HBA1C MFR BLD: 7.8 % (ref 4–5.6)

## 2022-02-04 PROCEDURE — 36415 COLL VENOUS BLD VENIPUNCTURE: CPT | Mod: PO | Performed by: INTERNAL MEDICINE

## 2022-02-04 PROCEDURE — 83036 HEMOGLOBIN GLYCOSYLATED A1C: CPT | Performed by: INTERNAL MEDICINE

## 2022-02-08 NOTE — PROGRESS NOTES
We're still seeing some creeping up on the A1c.  How are things going?  Should we try increasing the trulicity to the full dose (4.5mg)?  Any concerns for side effects form the meds?    Sincerely,  Moris Menchaca  [email or web address]

## 2022-02-10 ENCOUNTER — PATIENT MESSAGE (OUTPATIENT)
Dept: FAMILY MEDICINE | Facility: CLINIC | Age: 52
End: 2022-02-10
Payer: COMMERCIAL

## 2022-03-21 ENCOUNTER — PATIENT MESSAGE (OUTPATIENT)
Dept: ADMINISTRATIVE | Facility: HOSPITAL | Age: 52
End: 2022-03-21
Payer: COMMERCIAL

## 2022-03-24 DIAGNOSIS — E78.5 HYPERLIPIDEMIA ASSOCIATED WITH TYPE 2 DIABETES MELLITUS: Chronic | ICD-10-CM

## 2022-03-24 DIAGNOSIS — E11.69 HYPERLIPIDEMIA ASSOCIATED WITH TYPE 2 DIABETES MELLITUS: Chronic | ICD-10-CM

## 2022-03-24 RX ORDER — ATORVASTATIN CALCIUM 10 MG/1
TABLET, FILM COATED ORAL
Qty: 90 TABLET | Refills: 0 | Status: SHIPPED | OUTPATIENT
Start: 2022-03-24 | End: 2022-06-27

## 2022-03-24 NOTE — TELEPHONE ENCOUNTER
Care Due:                  Date            Visit Type   Department     Provider  --------------------------------------------------------------------------------                                EP -         [facility] FAM[facility]                              PRIMARY      MED/ INTERNAL  Last Visit: 04-      CARE ([facility])   MED/ PEDS      Moris Menchaca  Next Visit: None Scheduled  None         None Found                                                            Last  Test          Frequency    Reason                     Performed    Due Date  --------------------------------------------------------------------------------    Office Visit  12 months..  TRULICITY, atorvastatin,   04- 03-                             metFORMIN................    Powered by [employer]. Reference number: [id number].   3/24/2022 8:46:37 AM CDT

## 2022-03-24 NOTE — TELEPHONE ENCOUNTER
This Rx Request does not qualify for assessment with the [facility]   Please review protocol details and the Care Due Message for extra clinical information    Reasons Rx Request may be deferred:  Patient has been seen in the ED/Hospital since the last PCP visit    Note composed:12:41 PM 03/24/2022

## 2022-04-01 DIAGNOSIS — Z12.11 SCREENING FOR COLON CANCER: ICD-10-CM

## 2022-04-17 ENCOUNTER — HOSPITAL ENCOUNTER (EMERGENCY)
Facility: HOSPITAL | Age: 52
Discharge: HOME | End: 2022-04-17
Attending: EMERGENCY MEDICINE
Payer: COMMERCIAL

## 2022-04-17 VITALS
RESPIRATION RATE: 20 BRPM | WEIGHT: 220 LBS | SYSTOLIC BLOOD PRESSURE: 123 MMHG | BODY MASS INDEX: 30.8 KG/M2 | TEMPERATURE: 98 F | OXYGEN SATURATION: 97 % | HEIGHT: 71 IN | DIASTOLIC BLOOD PRESSURE: 72 MMHG | HEART RATE: 99 BPM

## 2022-04-17 DIAGNOSIS — R20.0 ARM NUMBNESS LEFT: ICD-10-CM

## 2022-04-17 DIAGNOSIS — M54.12 CERVICAL RADICULOPATHY: Primary | ICD-10-CM

## 2022-04-17 LAB — POCT GLUCOSE: 187 MG/DL (ref 70–110)

## 2022-04-17 PROCEDURE — 96372 THER/PROPH/DIAG INJ SC/IM: CPT | Performed by: EMERGENCY MEDICINE

## 2022-04-17 PROCEDURE — 82962 GLUCOSE BLOOD TEST: CPT | Mod: ER

## 2022-04-17 PROCEDURE — 99284 EMERGENCY DEPT VISIT MOD MDM: CPT | Mod: 25,ER

## 2022-04-17 PROCEDURE — 63600175 PHARM REV CODE 636 W HCPCS: Mod: ER | Performed by: EMERGENCY MEDICINE

## 2022-04-17 RX ORDER — METHYLPREDNISOLONE SOD SUCC 125 MG
125 VIAL (EA) INJECTION
Status: COMPLETED | OUTPATIENT
Start: 2022-04-17 | End: 2022-04-17

## 2022-04-17 RX ORDER — PREDNISONE 20 MG/1
40 TABLET ORAL DAILY
Qty: 10 TABLET | Refills: 0 | Status: SHIPPED | OUTPATIENT
Start: 2022-04-17 | End: 2022-04-22

## 2022-04-17 RX ORDER — METHOCARBAMOL 500 MG/1
500 TABLET, FILM COATED ORAL 2 TIMES DAILY PRN
Qty: 15 TABLET | Refills: 0 | Status: SHIPPED | OUTPATIENT
Start: 2022-04-17 | End: 2022-04-22

## 2022-04-17 RX ADMIN — METHYLPREDNISOLONE SODIUM SUCCINATE 125 MG: 125 INJECTION, POWDER, FOR SOLUTION INTRAMUSCULAR; INTRAVENOUS at 02:04

## 2022-04-17 NOTE — ED PROVIDER NOTES
Encounter Date: 4/17/2022       History     Chief Complaint   Patient presents with    Numbness     Pt c/o intermittant numbness in both arms from Tricep to hand for past few weeks started having numbness in L side of head 2-3 days ago all of which is worse at night     This patient has had several episodes in the past and diagnosed with a pinched nerve.  He presents with 3 days of similar symptomatology of a burning discomfort going down his neck into his arm in the triceps area.  He also had 1 episode on the right-hand side but has had more of the episodes on his left-hand side.  Denies any weakness anywhere and has no other complaints at the present time.    The history is provided by the patient.     Review of patient's allergies indicates:   Allergen Reactions    Lisinopril Rash    Penicillins Swelling     Past Medical History:   Diagnosis Date    Diabetes mellitus type II     Hypercholesteremia      Past Surgical History:   Procedure Laterality Date    removal of epididymal cyst       Family History   Problem Relation Age of Onset    Diabetes Mother     Cancer Mother         lung with mets to brain    Diabetes Maternal Aunt     Kidney disease Maternal Aunt     Diabetes Maternal Grandmother      Social History     Tobacco Use    Smoking status: Never Smoker    Smokeless tobacco: Never Used   Substance Use Topics    Alcohol use: Yes     Alcohol/week: 0.0 standard drinks     Comment: occasional    Drug use: No     Review of Systems   Constitutional: Negative.    HENT: Negative.    Eyes: Negative.    Respiratory: Negative.    Cardiovascular: Negative.    Gastrointestinal: Negative.    Endocrine: Negative.    Genitourinary: Negative.    Musculoskeletal: Negative.    Skin: Negative.    Allergic/Immunologic: Negative.    Neurological: Positive for numbness.   Hematological: Negative.    Psychiatric/Behavioral: Negative.    All other systems reviewed and are negative.      Physical Exam     Initial  Vitals [04/17/22 0132]   BP Pulse Resp Temp SpO2   123/72 99 20 98 °F (36.7 °C) 97 %      MAP       --         Physical Exam    Nursing note and vitals reviewed.  Constitutional: Vital signs are normal. He appears well-developed. He is active and cooperative.   HENT:   Head: Normocephalic and atraumatic.   Eyes: Conjunctivae, EOM and lids are normal. Pupils are equal, round, and reactive to light.   Neck: Trachea normal and phonation normal. Neck supple. No thyroid mass present. No Brudzinski's sign and no Kernig's sign noted. Carotid bruit is not present.   Normal range of motion.   Full passive range of motion without pain.     Cardiovascular: Normal rate, regular rhythm, S1 normal, S2 normal, normal heart sounds, intact distal pulses and normal pulses.   Pulmonary/Chest: Effort normal and breath sounds normal.   Abdominal: Abdomen is soft and flat. Bowel sounds are normal. There is no abdominal tenderness.   Musculoskeletal:         General: Normal range of motion.        Arms:       Cervical back: Full passive range of motion without pain, normal range of motion and neck supple. No edema, erythema or rigidity. No muscular tenderness. Normal range of motion.     Lymphadenopathy:     He has no axillary adenopathy.   Neurological: He is alert and oriented to person, place, and time. He has normal strength. No cranial nerve deficit or sensory deficit. GCS eye subscore is 4. GCS verbal subscore is 5. GCS motor subscore is 6.   Skin: Skin is warm, dry and intact.   Psychiatric: He has a normal mood and affect. His speech is normal and behavior is normal. Judgment and thought content normal. Cognition and memory are normal.         ED Course   Procedures  Labs Reviewed   POCT GLUCOSE - Abnormal; Notable for the following components:       Result Value    POCT Glucose 187 (*)     All other components within normal limits          Imaging Results          X-Ray Cervical Spine AP And Lateral (Final result)  Result time  04/17/22 02:24:37    Final result by Lewis Reilly MD (04/17/22 02:24:37)                 Impression:      No definite radiographic evidence of acute displaced fracture or traumatic subluxation of the cervical spine.  Moderate multilevel degenerative changes.      Electronically signed by: Lewis Reilly MD  Date:    04/17/2022  Time:    02:24             Narrative:    EXAMINATION:  XR CERVICAL SPINE AP LATERAL    CLINICAL HISTORY:  Anesthesia of skin    TECHNIQUE:  AP, lateral and open mouth views of the cervical spine were performed.    COMPARISON:  10/08/2019    FINDINGS:  There is mild straightening of the normal cervical lordosis.  Cervical vertebral body heights appear adequately maintained.  No definite acute displaced fracture identified.  There is moderate multilevel degenerative discogenic change at the levels of C4-C5 through C7-T1.                                 Medications   methylPREDNISolone sodium succinate injection 125 mg (125 mg Intramuscular Given 4/17/22 0241)                          Clinical Impression:   Final diagnoses:  [R20.0] Arm numbness left  [M54.12] Cervical radiculopathy (Primary)          ED Disposition Condition    Discharge Stable        ED Prescriptions     Medication Sig Dispense Start Date End Date Auth. Provider    predniSONE (DELTASONE) 20 MG tablet Take 2 tablets (40 mg total) by mouth once daily. for 5 days 10 tablet 4/17/2022 4/22/2022 Rush Chan MD    methocarbamoL (ROBAXIN) 500 MG Tab Take 1 tablet (500 mg total) by mouth 2 (two) times daily as needed. 15 tablet 4/17/2022 4/22/2022 Rush Chan MD        Follow-up Information     Follow up With Specialties Details Why Contact Info    Neurosurgeon  Schedule an appointment as soon as possible for a visit in 1 day  [phone]           Rush Chan MD  04/17/22 7981       Rush Chan MD  04/17/22 8913

## 2022-04-18 ENCOUNTER — TELEPHONE (OUTPATIENT)
Dept: NEUROSURGERY | Facility: CLINIC | Age: 52
End: 2022-04-18
Payer: COMMERCIAL

## 2022-04-18 ENCOUNTER — PATIENT MESSAGE (OUTPATIENT)
Dept: OTHER | Facility: OTHER | Age: 52
End: 2022-04-18
Payer: COMMERCIAL

## 2022-04-18 NOTE — TELEPHONE ENCOUNTER
----- Message from Stephanie Riley sent at 4/18/2022  9:29 AM CDT -----  Regarding: Pt called to speak to the nurse regarding his 11 am ER follow up appt today and pt would like a call back this morning asap  Patient Advice:    Pt called to speak to the nurse regarding his 11 am ER follow up appt today and pt would like a call back this morning asap. Pt states that he took an xray in the ER and needs to know if he needs to have an MRI done prior to seeing the doctor.    Pt can be reached at [phone]

## 2022-04-18 NOTE — TELEPHONE ENCOUNTER
Scheduled patient an appointment with Estrella BEDOLLA for 5/2/2022 @ 11:30 am patient verbally understood.

## 2022-04-28 ENCOUNTER — PATIENT MESSAGE (OUTPATIENT)
Dept: ADMINISTRATIVE | Facility: HOSPITAL | Age: 52
End: 2022-04-28
Payer: COMMERCIAL

## 2022-04-28 ENCOUNTER — PATIENT OUTREACH (OUTPATIENT)
Dept: ADMINISTRATIVE | Facility: HOSPITAL | Age: 52
End: 2022-04-28
Payer: COMMERCIAL

## 2022-04-28 RX ORDER — INFLUENZA A VIRUS A/VICTORIA/2570/2019 IVR-215 (H1N1) ANTIGEN (FORMALDEHYDE INACTIVATED), INFLUENZA A VIRUS A/TASMANIA/503/2020 IVR-221 (H3N2) ANTIGEN (FORMALDEHYDE INACTIVATED), INFLUENZA B VIRUS B/PHUKET/3073/2013 ANTIGEN (FORMALDEHYDE INACTIVATED), AND INFLUENZA B VIRUS B/WASHINGTON/02/2019 ANTIGEN (FORMALDEHYDE INACTIVATED) 15; 15; 15; 15 UG/.5ML; UG/.5ML; UG/.5ML; UG/.5ML
INJECTION, SUSPENSION INTRAMUSCULAR
COMMUNITY
Start: 2021-11-14

## 2022-04-28 RX ORDER — LATANOPROST 50 UG/ML
SOLUTION/ DROPS OPHTHALMIC
COMMUNITY
Start: 2022-04-27

## 2022-04-28 NOTE — PROGRESS NOTES
[facility]  Report ( [facility] ) - portal message sent.    Diabetic eye exam - A request was sent today for patient's record.

## 2022-04-28 NOTE — LETTER
AUTHORIZATION FOR RELEASE OF   CONFIDENTIAL INFORMATION    Dear Dr. Ruddy Davis,    We are seeing Christophe Carroll, date of birth 1970, in the clinic at [facility] FAMILY MED/ INTERNAL MED/ PEDS. Moris Menchaca MD is the patient's PCP. Christophe Carroll has an outstanding lab/procedure at the time we reviewed his chart. In order to help keep his health information updated, he has authorized us to request the following medical record(s):        (  )  MAMMOGRAM                                      (  )  COLONOSCOPY      (  )  PAP SMEAR                                          (  )  OUTSIDE LAB RESULTS     (  )  DEXA SCAN                                          ( x )  EYE EXAM(diabetic) [id number]            (  )  FOOT EXAM                                          (  )  ENTIRE RECORD     (  )  OUTSIDE IMMUNIZATIONS                 (  )  _______________         Please fax records to [facility], Oliver B Mollere, MD,  [phone].     If you have any questions, please contact Citlaly García LPN [facility] at   [phone].                  Patient Name: Christophe Carroll  : 1970  Patient Phone #: [phone]

## 2022-05-02 ENCOUNTER — OFFICE VISIT (OUTPATIENT)
Dept: NEUROSURGERY | Facility: CLINIC | Age: 52
End: 2022-05-02
Payer: COMMERCIAL

## 2022-05-02 VITALS
WEIGHT: 216.69 LBS | DIASTOLIC BLOOD PRESSURE: 77 MMHG | SYSTOLIC BLOOD PRESSURE: 111 MMHG | BODY MASS INDEX: 30.34 KG/M2 | HEART RATE: 105 BPM | HEIGHT: 71 IN

## 2022-05-02 DIAGNOSIS — M48.02 SPINAL STENOSIS, CERVICAL REGION: ICD-10-CM

## 2022-05-02 DIAGNOSIS — M54.12 CERVICAL RADICULOPATHY: Primary | ICD-10-CM

## 2022-05-02 PROCEDURE — 3078F PR MOST RECENT DIASTOLIC BLOOD PRESSURE < 80 MM HG: ICD-10-PCS | Mod: CPTII,S$GLB,,

## 2022-05-02 PROCEDURE — 1159F MED LIST DOCD IN RCRD: CPT | Mod: CPTII,S$GLB,,

## 2022-05-02 PROCEDURE — 1159F PR MEDICATION LIST DOCUMENTED IN MEDICAL RECORD: ICD-10-PCS | Mod: CPTII,S$GLB,,

## 2022-05-02 PROCEDURE — 99205 OFFICE O/P NEW HI 60 MIN: CPT | Mod: S$GLB,,,

## 2022-05-02 PROCEDURE — 3008F BODY MASS INDEX DOCD: CPT | Mod: CPTII,S$GLB,,

## 2022-05-02 PROCEDURE — 3051F PR MOST RECENT HEMOGLOBIN A1C LEVEL 7.0 - < 8.0%: ICD-10-PCS | Mod: CPTII,S$GLB,,

## 2022-05-02 PROCEDURE — 99999 PR PBB SHADOW E&M-EST. PATIENT-LVL IV: CPT | Mod: PBBFAC,,,

## 2022-05-02 PROCEDURE — 3074F PR MOST RECENT SYSTOLIC BLOOD PRESSURE < 130 MM HG: ICD-10-PCS | Mod: CPTII,S$GLB,,

## 2022-05-02 PROCEDURE — 3008F PR BODY MASS INDEX (BMI) DOCUMENTED: ICD-10-PCS | Mod: CPTII,S$GLB,,

## 2022-05-02 PROCEDURE — 99205 PR OFFICE/OUTPT VISIT, NEW, LEVL V, 60-74 MIN: ICD-10-PCS | Mod: S$GLB,,,

## 2022-05-02 PROCEDURE — 99999 PR PBB SHADOW E&M-EST. PATIENT-LVL IV: ICD-10-PCS | Mod: PBBFAC,,,

## 2022-05-02 PROCEDURE — 3078F DIAST BP <80 MM HG: CPT | Mod: CPTII,S$GLB,,

## 2022-05-02 PROCEDURE — 3074F SYST BP LT 130 MM HG: CPT | Mod: CPTII,S$GLB,,

## 2022-05-02 PROCEDURE — 3051F HG A1C>EQUAL 7.0%<8.0%: CPT | Mod: CPTII,S$GLB,,

## 2022-05-02 RX ORDER — METHOCARBAMOL 500 MG/1
500 TABLET, FILM COATED ORAL 4 TIMES DAILY
Qty: 60 TABLET | Refills: 0 | Status: SHIPPED | OUTPATIENT
Start: 2022-05-02 | End: 2023-06-05

## 2022-05-02 RX ORDER — PREDNISONE 1 MG/1
1 TABLET ORAL DAILY
COMMUNITY
End: 2022-08-22 | Stop reason: ALTCHOICE

## 2022-05-02 RX ORDER — METHOCARBAMOL 500 MG/1
TABLET, FILM COATED ORAL 4 TIMES DAILY
COMMUNITY
End: 2022-05-02 | Stop reason: SDUPTHER

## 2022-05-02 RX ORDER — GABAPENTIN 100 MG/1
100 CAPSULE ORAL 2 TIMES DAILY
Qty: 60 CAPSULE | Refills: 11 | Status: SHIPPED | OUTPATIENT
Start: 2022-05-02 | End: 2023-06-05

## 2022-05-02 NOTE — PROGRESS NOTES
History of Present Illness:  Christophe Carroll is a 51 y.o. male who presents with a primary complaint of left sided neck pain that radiates down his arm. The pain is sharp, aching, sometimes burning and radiates from his neck to his left tricep. He states he first noticed the pain 5 years ago and saw a PMR physician who recommended a procedure. He cannot recall the procedure and states he never went through with it. The pain resolved on its own and returned around October/November of 2021. The pain started off sporadically and is present almost daily now. He also noticed associated intermittent numbness/tingling in his pinky, ring and middle fingers as well as weakness with tricep exercises while working out. He presented to the ED on 4/17 for the pain where he obtained an X-ray and was prescribed Robaxin and Prednisone. He states the medications gave him some relief. He also states that working out has helped with his pain and the pain intensifies whenever he stops exercising for a while. He denies trauma to his neck or left extremity, right upper extremity symptoms, back pain, lower extremity symptoms, troubles walking, dropping objects/difficulties buttoning his shirts or writing, bowel/bladder dysfunction.    Review of patient's allergies indicates:   Allergen Reactions    Lisinopril Rash    Penicillins Swelling       Current Outpatient Medications:     aspirin 81 MG Chew, Take 1 tablet (81 mg total) by mouth once daily., Disp: 30 tablet, Rfl: 0    atorvastatin (LIPITOR) 10 MG tablet, TAKE 1 TABLET(10 MG) BY MOUTH EVERY DAY, Disp: 90 tablet, Rfl: 0    FLUZONE QUAD [id number], PF, 60 mcg (15 mcg x 4)/0.5 mL Syrg, , Disp: , Rfl:     latanoprost 0.005 % ophthalmic solution, , Disp: , Rfl:     metFORMIN (GLUCOPHAGE-XR) 500 MG ER 24hr tablet, Take 2 tablets (1,000 mg total) by mouth 2 (two) times daily with meals., Disp: 360 tablet, Rfl: 1    sildenafil citrate (VIAGRA ORAL), CHEW 1 TO 2 TABLETS BY MOUTH  30 TO 45 MINUTES BEFORE SEXUAL INTERCOURSE, Disp: , Rfl:     TRAVATAN Z 0.004 % Drop, , Disp: , Rfl: 6    TRULICITY 3 mg/0.5 mL pen injector, INJECT 3 MG( 0.5 ML) UNDER THE SKIN EVERY 7 DAYS( DISCONTINUE 1.5 MG PRESCRIPTION), Disp: 12 pen, Rfl: 0    diclofenac sodium (VOLTAREN) 1 % Gel, Apply 2 g topically 4 (four) times daily. Apply to painful area for 10 days (Patient not taking: Reported on 5/2/2022), Disp: 1 Tube, Rfl: 0    gabapentin (NEURONTIN) 100 MG capsule, Take 1 capsule (100 mg total) by mouth 2 (two) times daily., Disp: 60 capsule, Rfl: 11    methocarbamoL (ROBAXIN) 500 MG Tab, Take 1 tablet (500 mg total) by mouth 4 (four) times daily., Disp: 60 tablet, Rfl: 0    predniSONE (DELTASONE) 1 MG tablet, Take 1 mg by mouth once daily., Disp: , Rfl:     sars-cov-2, covid-19, (MODERNA COVID-19) 100 mcg/0.5 ml injection, , Disp: , Rfl:     Current Facility-Administered Medications:     acetaminophen tablet 650 mg, 650 mg, Oral, Once PRN, Vargas Berger MD    albuterol inhaler 2 puff, 2 puff, Inhalation, Q20 Min PRN, Vargas Berger MD    diphenhydrAMINE injection 25 mg, 25 mg, Intravenous, Once PRN, Vargas Berger MD    EPINEPHrine (EPIPEN) 0.3 mg/0.3 mL pen injection 0.3 mg, 0.3 mg, Intramuscular, PRN, Vargas Berger MD    methylPREDNISolone sodium succinate injection 40 mg, 40 mg, Intravenous, Once PRN, Vargas Berger MD    ondansetron disintegrating tablet 4 mg, 4 mg, Oral, Once PRN, Vargas Berger MD    sodium chloride 0.9% 500 mL flush bag, , Intravenous, PRN, Vargas Berger MD    sodium chloride 0.9% flush 10 mL, 10 mL, Intravenous, PRN, Vargas Berger MD     Past Medical History:   Diagnosis Date    Diabetes mellitus type II     Hypercholesteremia      Past Surgical History:   Procedure Laterality Date    removal of epididymal cyst       Family History   Problem Relation Age of Onset    Diabetes Mother     Cancer Mother         lung with mets to brain     Diabetes Maternal Aunt     Kidney disease Maternal Aunt     Diabetes Maternal Grandmother      Social History     Tobacco Use    Smoking status: Never Smoker    Smokeless tobacco: Never Used   Substance Use Topics    Alcohol use: Yes     Alcohol/week: 0.0 standard drinks     Comment: occasional    Drug use: No       Review of Systems:  Constitutional: No fever or chills. No fatigue.  Eyes: No visual changes.  ENT: No nasal congestion or sore throat. No trouble swallowing.  Respiratory: No cough or shortness of breath.  Cardiovascular: No chest pain or palpitations.  Gastrointestinal: No nausea or vomiting, tolerating diet.  Genitourinary: No hematuria or dysuria.  Skin: No rash or pruritis.  Hematologic/Lymphatic: No easy bruising or lymphadenopathy.  Musculoskeletal: No arthralgias or myalgias.  Neurological: No seizures or tremors. No dizziness.  Behavioral/Psych: No auditory or visual hallucinations. No SI/HI.  Endocrine: No heat or cold intolerance.    OBJECTIVE:     Vital Signs (Most Recent):  Pulse: 105 (05/02/22 1116)  BP: 111/77 (05/02/22 1116)    Physical Exam:  General: Well developed, well nourished, no distress.  Head: Normocephalic, atraumatic.  Neurologic: Alert and oriented. Thought content appropriate  GCS: Motor: 6/Verbal: 5/Eyes: 4 GCS Total: 15  Mental Status: Awake, Alert, Oriented x 4.  Language: No aphasia.  Speech: No dysarthria.  Cranial nerves: Face symmetric, tongue midline, CN II-XII grossly intact.   Eyes: Pupils equal, round, reactive to light with accommodation, EOMI.   Pulmonary: Normal respirations, not labored, no accessory muscles used.  Abdomen: Soft, non-distended, not tender to palpation.  Sensory: Decreased sensation to light touch in left upper extremity in C5 dermatomal pattern.  Motor Strength: Moves all extremities spontaneously with good tone. No abnormal movements seen.     Strength  Deltoids Triceps Biceps Wrist Extension Wrist Flexion Hand    Upper: R 5/5 5/5  5/5 5/5 5/5 5/5    L 5/5 4+/5 5/5 5/5 5/5 5/5     Iliopsoas Quadriceps Knee  Flexion Tibialis  anterior Gastro- cnemius EHL   Lower: R 5/5 5/5 5/5 5/5 5/5 5/5    L 5/5 5/5 5/5 5/5 5/5 5/5     DTRs: 2 + and symmetric triceps, biceps, brachioradialis, patellar, & achilles.  Gómez: Absent.  Clonus: Absent.  Vascular: No LE edema or skin changes.  Skin: Warm, dry and intact, no rashes.    Gait: Normal.    Tandem Gait: No difficulty.  Cervical ROM: Full.    Laboratory:  I have reviewed all pertinent lab results within the past 24 hours.    Diagnostic Results:  Cervical spine X-ray from 4/17/22 showing multilevel degenerative changes at levels C4-5 and C7-T1. No acute fractures or abnormalities. Mild loss of cervical lordosis.   Previous CT of cervical spine from 2019 reviewed. Shows multilevel degenerative changes and disc space narrowing at C4-7, worse from X-ray on 10/28/2014.  MRI of cervical spine from 2014 shows multilevel uncovertebral spurring causing foraminal stenosis, severe at C5-6 on the right and C6-7 on the left.     ASSESSMENT/PLAN:     After review of prior imaging and clinical exam, patient's symptoms appear radicular in nature. Patient would benefit from PT/OT referral, given exercise relieved his pain. Will continue patient's Robaxin 500 mg QID and start Gabapentin 100 mg BID for pain. MRI of cervical spine necessary to determine source of patient's symptoms. Will follow up with me upon completion of MRI to discuss findings.     All symptoms and signs that require emergent or urgent treatment were reviewed. The plan was discussed with the patient. All of the the patient's questions and concerns were answered and he voiced understanding. Please feel free to call with any further questions.     Estrella Bird PA-C  Neurosurgery   [facility]

## 2022-05-03 ENCOUNTER — CLINICAL SUPPORT (OUTPATIENT)
Dept: REHABILITATION | Facility: HOSPITAL | Age: 52
End: 2022-05-03
Payer: COMMERCIAL

## 2022-05-03 DIAGNOSIS — M54.12 CERVICAL RADICULOPATHY: ICD-10-CM

## 2022-05-03 DIAGNOSIS — R29.3 POSTURE IMBALANCE: Primary | ICD-10-CM

## 2022-05-03 DIAGNOSIS — M62.81 DECREASED MUSCLE STRENGTH: ICD-10-CM

## 2022-05-03 DIAGNOSIS — R29.898 DECREASED ROM OF NECK: ICD-10-CM

## 2022-05-03 DIAGNOSIS — M48.02 SPINAL STENOSIS, CERVICAL REGION: ICD-10-CM

## 2022-05-03 PROCEDURE — 97161 PT EVAL LOW COMPLEX 20 MIN: CPT | Mod: PN

## 2022-05-03 PROCEDURE — 97110 THERAPEUTIC EXERCISES: CPT | Mod: PN

## 2022-05-03 NOTE — PLAN OF CARE
Physical Therapy Initial Evaluation     Name: Christophe Carroll  Clinic Number: [id number]    Therapy Diagnosis:   Encounter Diagnoses   Name Primary?    Spinal stenosis, cervical region     Cervical radiculopathy     Posture imbalance Yes    Decreased muscle strength     Decreased ROM of neck      Physician: Estrella Bird PA-C    Physician Orders: PT Eval and Treat   Medical Diagnosis from Referral:   M48.02 (ICD-10-CM) - Spinal stenosis, cervical region   M54.12 (ICD-10-CM) - Cervical radiculopathy       Evaluation Date: 5/3/2022  Authorization Period Expiration: 05/02/2023  Plan of Care Expiration: 8/3/22  Visit # / Visits authorized: 1/ 1    Time In: 1204  Time Out: 1253  Total Billable Time: 49 minutes    Precautions: Standard, Diabetes and Hypercholesteremia    Subjective     Medical History:   Past Medical History:   Diagnosis Date    Diabetes mellitus type II     Hypercholesteremia        Surgical History:   Christophe Carroll  has a past surgical history that includes removal of epididymal cyst.    Medications:   Christophe has a current medication list which includes the following prescription(s): aspirin, atorvastatin, diclofenac sodium, fluzone quad [id number] (pf), gabapentin, latanoprost, metformin, methocarbamol, prednisone, sars-cov-2 (covid-19), sildenafil citrate, travatan z, and trulicity, and the following Facility-Administered Medications: acetaminophen, albuterol, diphenhydramine, epinephrine, methylprednisolone sodium succinate, ondansetron, sodium chloride 0.9% 500 mL flush bag, and sodium chloride 0.9%.    Allergies:   Review of patient's allergies indicates:   Allergen Reactions    Lisinopril Rash    Penicillins Swelling        Date of onset: 6 months  History of current condition - Christophe reports: He started to have some problems with his neck in 2014 and it seemed like a pinched nerve and went away. Then he did not have anymore  problems with it. However, now it started to act up again around October. He gets some numbness that goes down his arm but he would ignore it. He would get some pain and if he is not supporting his arms then he gets pain and numbness feeling in the neck and to the back of the tricep. He started to get a burning sensation one night and knew he needed to get it looked at. He reports that he has C4-6 with spurs on the vertebrae. He is getting an MRI on Monday and then they will let him know how severe it is. He did have the x-ray and from that they think he has the pinched nerve. They are waiting for the MRI to see what the best course of action is. He has noticed that if he works out more then the does not feel it as much. Therefore, they want him to try physical therapy to see if that will help. He stopped working out around December and that is when he started to notice the pain more.  He gets the numbness in his pinky, ring finger and sometimes the middle finger. He has started to get some numbness in the pinking, ring finger and middle finger as well but it is not as severe the left. He reports that he is RHD.     Imaging, from MD note: Cervical spine X-ray from 4/17/22 showing multilevel degenerative changes at levels C4-5 and C7-T1. No acute fractures or abnormalities. Mild loss of cervical lordosis.   Previous CT of cervical spine from 2019 reviewed. Shows multilevel degenerative changes and disc space narrowing at C4-7, worse from X-ray on 10/28/2014.  MRI of cervical spine from 2014 shows multilevel uncovertebral spurring causing foraminal stenosis, severe at C5-6 on the right and C6-7 on the left.     Prior Therapy: PT for his low back  Social History:  lives with their spouse  Occupation:  IT; sitting at a desk  Prior Level of Function: independent  DME owned/used: none  Current Level of Function:  Independent with pain or N/T, increase in weakness of LUE    Pain:  Current 2/10, worst 8/10, best 2/10  "  Location: left neck   Description: Burning, Tingling and Sharp  Aggravating Factors: when his arm is not supported, driving  Easing Factors: moving his neck and stretching    Pts goals: "I want to resolve the issue and be able to maintain on my own."    Objective       Posture Alignment: slouched posture; rounded shoulders    CERVICAL SPINE AROM:   Flexion: 70 pinching sensation   Extension: 40 relief from looking down   Left Sidebend: 55 with cavitation  turning to left   Right Sidebend: 55   Left Rotation: 65   Right Rotation: 65 pain in right shoulder     SEGMENTAL MOBILITY: Hypomobility with cervical spine    UPPER EXTREMITY STRENGTH:   Left Right   Shoulder Flexion 5/5 5/5   Shoulder Abduction 4+/5 5/5     Shoulder Internal Rotation 5/5 5/5   Shoulder External Rotation 4+/5 5/5   Elbow Flexion 5/5 5/5   Elbow Extension 4+/5 4+/5   Wrist Flexion 4+/5 4+/5   Wrist Extension 4+/5 4+/5    70,70,70 90,80,90     Ulnar UnTT:  +  Median UNTT: +    + Bakody's sign    Dermatomes: Sensation: Light Touch: Intact  Palpation: hypertonicity of bilateral cervical paraspinals and [place] UT    Special Tests:   Left Right   Compression + +   Distraction + centralization + centralization   Spurling + +     Pt/family was provided educational information, including: role of PT, goals for PT, scheduling - pt verbalized understanding. Discussed insurance limitations with pt.     CMS Impairment/Limitation/Restriction for FOTO Neck Survey    Therapist reviewed FOTO scores for Christophe Carroll on 5/3/2022.   FOTO documents entered into [facility] - see Media section.    Limitation Score: not administered by staff        TREATMENT     Treatment Time In: 1234  Treatment Time Out: 1253  Total Treatment time separate from Evaluation: 19 minutes    Christophe received therapeutic exercises to develop ROM, flexibility and posture for 19 minutes including:    Supine chin tucks 3"x20  scap retractions 20x  UT str 30"x2 ea  LS str 30"x2 ea  Ulnar " nerve glide 10x  Median nerve glide 10x    NV: UBE,  Postural series on 1/2 roll, seated thoracic extension over 1/2 roll, pec stretch    Home Exercises and Patient Education Provided    Education provided:   - HEP  - POC  - lumbar support for proper posture at work    Written Home Exercises Provided: yes.  Exercises were reviewed and Christophe was able to demonstrate them prior to the end of the session.  Christophe demonstrated good  understanding of the education provided.     See EMR under Patient Instructions for exercises provided 5/3/2022.    Assessment     Christophe is a 51 y.o. male referred to outpatient Physical Therapy with a medical diagnosis of Spinal stenosis, cervical region and cervical radiculopathy. with signs and symptoms including: increased left neck and LUE pain, decreased cervical ROM, decreased UE Strength, soft tissue dysfunction, postural imbalance,impaired joint mobility, and decreased tolerance to functional activities. He presented with signs and symptoms consistent with medical diagnosis. He had increase in pain with compression and spurling's as well as with ULTT indicating positive radicular symptoms. Pt had relief with Bakody's sign indicating involvements of nerve roots c4-6. He also displayed centralization with distraction indicating that he may benefit from mechanical traction at future visits. At this time he is not limited with any of his daily activities but has noticed decrease in LUE as compared to right.   Pt with good motivation to perform physical activity and responds well to cueing.      Pt prognosis is Good.   Pt will benefit from skilled outpatient Physical Therapy to address the deficits stated above and in the chart below, provide pt/family education, and to maximize pt's level of independence.     Plan of care discussed with patient: Yes  Pt's spiritual, cultural and educational needs considered and patient is agreeable to the plan of care and goals as stated below:      Anticipated Barriers for therapy: chronicity of condition    Medical Necessity is demonstrated by the following  History  Co-morbidities and personal factors that may impact the plan of care Co-morbidities:   Diabetes mellitus type II   Hypercholesteremia       Personal Factors:   social background  lifestyle     moderate   Examination  Body Structures and Functions, activity limitations and participation restrictions that may impact the plan of care Body Regions:   head  neck  upper extremities    Body Systems:    gross symmetry  ROM  strength  motor control  motor learning    Participation Restrictions:   Difficulty lifting with LUE    Activity limitations:   Learning and applying knowledge  no deficits    General Tasks and Commands  no deficits    Communication  no deficits    Mobility  lifting and carrying objects  driving (bike, car, motorcycle)    Self care  no deficits    Domestic Life  no deficits    Interactions/Relationships  No deficits    Life Areas  No deficits    Community and Social Life  No deficits         moderate   Clinical Presentation stable and uncomplicated moderate   Decision Making/ Complexity Score: low     Pt's spiritual, cultural and educational needs considered and pt agreeable to plan of care and goals as stated below:       Short Term GOALS: 5 weeks. Pt agrees with goals set.  1. Patient demonstrates independence with HEP.   2. Patient demonstrates independence with Postural Awareness.   3. Patient demonstrates independence with body mechanics.   4. Patient will report pain of </=6/10 at worst, on 0-10 pain scale, with all activity  5. Patient demonstrates increased cervical extension by 5 to 10 to improve tolerance to functional activities pain free.       Long Term GOALS: 10 weeks. Pt agrees with goals set.  1. Patient demonstrates all cervical ROM to WFL without pain to improve tolerance to functional activities pain free.   2. Patient demonstrates increased strength BUE's to  5/5 or greater to improve tolerance to functional activities pain free.   3. Patient demonstrates ability to sit for 30 minutes or greater without increase in pain to allow him to complete work tasks without pain  4. Patient will report pain of </=3/10 at worst, on 0-10 pain scale, with all activity  5. Patient demonstrates ability to drive for 1 hour or more without increase in pain to allow him to improve tolerance to functional tasks pain free      PLAN       Plan of care Certification: 5/3/2022 to 8/3/22.    Outpatient Physical Therapy 2 times weekly for 10 weeks to include the following interventions: Cervical/Lumbar Traction, Manual Therapy, Moist Heat/ Ice, Neuromuscular Re-ed, Therapeutic Activities, Therapeutic Exercise and FDN.  Pt may be seen by PTA as part of the rehabilitation team.     Mira Schwarz, PT,DPT  5/3/2022    I have seen the patient, reviewed the therapist's plan of care, and I agree with the plan of care.      I certify the need for these services furnished under this plan of treatment and while under my care.     ___________________ ________ Physician/Referring Practitioner            ___________________________ Date of Signature

## 2022-05-03 NOTE — PROGRESS NOTES
"See Plan of Care for complete Physical Therapy Evaluation.                                                     Physical Therapy Initial Evaluation     Name: Christophe Carroll  Clinic Number: [id number]    Therapy Diagnosis:   Encounter Diagnoses   Name Primary?    Spinal stenosis, cervical region     Cervical radiculopathy     Posture imbalance Yes    Decreased muscle strength     Decreased ROM of neck      Physician: Estrella Bird PA-C    Physician Orders: PT Eval and Treat   Medical Diagnosis from Referral:   M48.02 (ICD-10-CM) - Spinal stenosis, cervical region   M54.12 (ICD-10-CM) - Cervical radiculopathy       Evaluation Date: 5/3/2022  Authorization Period Expiration: 05/02/2023  Plan of Care Expiration: 8/3/22  Visit # / Visits authorized: 1/ 1    Time In: 1204  Time Out: 1253  Total Billable Time: 49 minutes    Precautions: Standard, Diabetes and Hypercholesteremia      TREATMENT     Treatment Time In: 1234  Treatment Time Out: 1253  Total Treatment time separate from Evaluation: 19 minutes    Christophe received therapeutic exercises to develop ROM, flexibility and posture for 19 minutes including:    Supine chin tucks 3"x20  scap retractions 20x  UT str 30"x2 ea  LS str 30"x2 ea  Ulnar nerve glide 10x  Median nerve glide 10x    NV: UBE,  Postural series on 1/2 roll, seated thoracic extension over 1/2 roll, pec stretch    Short Term GOALS: 5 weeks. Pt agrees with goals set.  1. Patient demonstrates independence with HEP.   2. Patient demonstrates independence with Postural Awareness.   3. Patient demonstrates independence with body mechanics.   4. Patient will report pain of </=6/10 at worst, on 0-10 pain scale, with all activity  5. Patient demonstrates increased cervical extension by 5 to 10 to improve tolerance to functional activities pain free.       Long Term GOALS: 10 weeks. Pt agrees with goals set.  1. Patient demonstrates all cervical ROM to WFL without pain to improve tolerance to functional " activities pain free.   2. Patient demonstrates increased strength BUE's to 5/5 or greater to improve tolerance to functional activities pain free.   3. Patient demonstrates ability to sit for 30 minutes or greater without increase in pain to allow him to complete work tasks without pain  4. Patient will report pain of </=3/10 at worst, on 0-10 pain scale, with all activity  5. Patient demonstrates ability to drive for 1 hour or more without increase in pain to allow him to improve tolerance to functional tasks pain free      PLAN       Plan of care Certification: 5/3/2022 to 8/3/22.    Outpatient Physical Therapy 2 times weekly for 10 weeks to include the following interventions: Cervical/Lumbar Traction, Manual Therapy, Moist Heat/ Ice, Neuromuscular Re-ed, Therapeutic Activities, Therapeutic Exercise and FDN.  Pt may be seen by PTA as part of the rehabilitation team.     Mira Schwarz, PT,DPT  5/3/2022

## 2022-05-11 ENCOUNTER — TELEPHONE (OUTPATIENT)
Dept: NEUROSURGERY | Facility: CLINIC | Age: 52
End: 2022-05-11
Payer: COMMERCIAL

## 2022-05-19 ENCOUNTER — DOCUMENTATION ONLY (OUTPATIENT)
Dept: REHABILITATION | Facility: HOSPITAL | Age: 52
End: 2022-05-19
Payer: COMMERCIAL

## 2022-05-19 ENCOUNTER — TELEPHONE (OUTPATIENT)
Dept: NEUROSURGERY | Facility: CLINIC | Age: 52
End: 2022-05-19
Payer: COMMERCIAL

## 2022-05-19 NOTE — PROGRESS NOTES
Pt did not attend 1st follow up visit today. Called pt and he states he was running late in court and forgot to call and cancel. Confirmed NV on 5/26/22 at 4:00

## 2022-05-26 ENCOUNTER — CLINICAL SUPPORT (OUTPATIENT)
Dept: REHABILITATION | Facility: HOSPITAL | Age: 52
End: 2022-05-26
Payer: COMMERCIAL

## 2022-05-26 DIAGNOSIS — M62.81 DECREASED MUSCLE STRENGTH: Primary | ICD-10-CM

## 2022-05-26 DIAGNOSIS — R29.898 DECREASED ROM OF NECK: ICD-10-CM

## 2022-05-26 PROCEDURE — 97140 MANUAL THERAPY 1/> REGIONS: CPT | Mod: PN,CQ

## 2022-05-26 PROCEDURE — 97110 THERAPEUTIC EXERCISES: CPT | Mod: PN,CQ

## 2022-05-26 NOTE — PROGRESS NOTES
"[facility]   Physical Therapy Treatment Note     Name: Christophe Carroll  Clinic Number: [id number]    Therapy Diagnosis: No diagnosis found.  Physician: Estrella Bird PA-C    Visit Date: 5/26/2022    Physician: Estrella Bird PA-C     Physician Orders: PT Eval and Treat   Medical Diagnosis from Referral:   M48.02 (ICD-10-CM) - Spinal stenosis, cervical region   M54.12 (ICD-10-CM) - Cervical radiculopathy         Evaluation Date: 5/3/2022  PN Due: 6/3/2022  Authorization Period Expiration: 05/02/2023  Plan of Care Expiration: 8/3/22  Visit # / Visits authorized: 1/ 20 (+eval)      PTA Visit #: 1/5     Time In: 405  Time Out: 500  Total Billable Time: 55 minutes    SUBJECTIVE     Pt reports: Pt states the numbness/tingling seems to be a little better and he has noticed decreased pain since starting the exercises at home.   He was compliant with home exercise program.  Response to previous treatment: initial evaluation  Functional change: none    Pain: 2/10  Location: left neck       OBJECTIVE     Objective Measures updated at progress report unless specified.     Treatment     Christophe received therapeutic exercises to develop ROM, flexibility and posture for 45 minutes including:     +UBE 3/3  +rows GTB (2 green cords) 3x10  +shoulder extension GTB ( 2 green cords) 3x10  +seated thoracic extension over 1/2 roll 15x5"  UT str 30"x2 ea  LS str 30"x2 ea    +1/2 foam:   Snow angels 20x   Wand flexion 4# 2x10   Serratus punch 4# 2x10    Horizontal abduction  2x10   pec stretch 2'    Supine chin tucks 3"x20    scap retractions 20x  Ulnar nerve glide 10x  Median nerve glide 10x              manual therapy techniques: Soft tissue Mobilization were applied to the: neck for 10 minutes, including:  STM L UT/LS  Cervical distractions                  Patient Education and Home Exercises     Home Exercises Provided and Patient Education Provided     Education provided:   -     Written Home " Exercises Provided: Patient instructed to cont prior HEP. Exercises were reviewed and Christophe was able to demonstrate them prior to the end of the session.  Christophe demonstrated good  understanding of the education provided. See EMR under Patient Instructions for exercises provided during therapy sessions    ASSESSMENT     Pt arrived to therapy with minimal pain symptoms today. He verbalizes HEP compliance with positive symptom response. Pt had good tolerance to all additional exercises introduced today and completed without further exacerbation of symptoms. He also had good tolerance to manual techniques performed this date and denies pain at end of session today. Will benefit from exercise progressions next visit as tolerated.     Christophe Is progressing well towards his goals.   Pt prognosis is Good.     Pt will continue to benefit from skilled outpatient physical therapy to address the deficits listed in the problem list box on initial evaluation, provide pt/family education and to maximize pt's level of independence in the home and community environment.     Pt's spiritual, cultural and educational needs considered and pt agreeable to plan of care and goals.     Anticipated barriers to physical therapy: chronicity of condition       Goals: Short Term GOALS: 5 weeks. Pt agrees with goals set.  1. Patient demonstrates independence with HEP.   2. Patient demonstrates independence with Postural Awareness.   3. Patient demonstrates independence with body mechanics.   4. Patient will report pain of </=6/10 at worst, on 0-10 pain scale, with all activity  5. Patient demonstrates increased cervical extension by 5 to 10 to improve tolerance to functional activities pain free.         Long Term GOALS: 10 weeks. Pt agrees with goals set.  1. Patient demonstrates all cervical ROM to WFL without pain to improve tolerance to functional activities pain free.   2. Patient demonstrates increased strength BUE's to 5/5 or greater to  improve tolerance to functional activities pain free.   3. Patient demonstrates ability to sit for 30 minutes or greater without increase in pain to allow him to complete work tasks without pain  4. Patient will report pain of </=3/10 at worst, on 0-10 pain scale, with all activity  5. Patient demonstrates ability to drive for 1 hour or more without increase in pain to allow him to improve tolerance to functional tasks pain free      PLAN     Plan of care Certification: 5/3/2022 to 8/3/22.     Outpatient Physical Therapy 2 times weekly for 10 weeks to include the following interventions: Cervical/Lumbar Traction, Manual Therapy, Moist Heat/ Ice, Neuromuscular Re-ed, Therapeutic Activities, Therapeutic Exercise and FDN.  Pt may be seen by PTA as part of the rehabilitation team.     Cont PT POC, progress as tolerated    Rad Lee PTA

## 2022-06-01 ENCOUNTER — CLINICAL SUPPORT (OUTPATIENT)
Dept: REHABILITATION | Facility: HOSPITAL | Age: 52
End: 2022-06-01
Payer: COMMERCIAL

## 2022-06-01 ENCOUNTER — PATIENT MESSAGE (OUTPATIENT)
Dept: ADMINISTRATIVE | Facility: HOSPITAL | Age: 52
End: 2022-06-01
Payer: COMMERCIAL

## 2022-06-01 DIAGNOSIS — R29.898 DECREASED ROM OF NECK: ICD-10-CM

## 2022-06-01 DIAGNOSIS — M62.81 DECREASED MUSCLE STRENGTH: Primary | ICD-10-CM

## 2022-06-01 PROCEDURE — 97110 THERAPEUTIC EXERCISES: CPT | Mod: PN

## 2022-06-01 PROCEDURE — 97140 MANUAL THERAPY 1/> REGIONS: CPT | Mod: PN

## 2022-06-01 NOTE — PROGRESS NOTES
"[facility]   Physical Therapy Treatment Note     Name: Christophe Carroll  Clinic Number: [id number]    Therapy Diagnosis:   Encounter Diagnoses   Name Primary?    Decreased muscle strength Yes    Decreased ROM of neck      Physician: Estrella Bird PA-C    Visit Date: 6/1/2022    Physician: Estrella Bird PA-C     Physician Orders: PT Eval and Treat   Medical Diagnosis from Referral:   M48.02 (ICD-10-CM) - Spinal stenosis, cervical region   M54.12 (ICD-10-CM) - Cervical radiculopathy         Evaluation Date: 5/3/2022  PN Due: 7/1/2022  Authorization Period Expiration: 05/02/2023  Plan of Care Expiration: 8/3/22  Visit # / Visits authorized: 2/ 20 (+eval)      PTA Visit #: 0/5     Time In: 1542  Time Out: 1630  Total Billable Time: 48 minutes    SUBJECTIVE     Pt reports: He has been doing really well. He has been having the greatest difficulty at night and he believes that it may be how he sleeps. His pain in the last week has been at its worst has been 4/10.  He was compliant with home exercise program.  Response to previous treatment: good without soreness  Functional change: none    Pain: 0/10  Location: left neck     OBJECTIVE     Objective Measures updated at progress report unless specified.     6/1/22:    CERVICAL SPINE AROM:   Flexion: 70 pinching sensation   Extension: 60 relief from looking down   Left Sidebend: 60 with cavitation  turning to left   Right Sidebend: 60   Left Rotation: 65   Right Rotation: 65 pain in right shoulder        Treatment       Christophe received therapeutic exercises to develop ROM, flexibility and posture for 38 minutes including:     UBE 3/3  rows GTB (2 green cords) 3x10  shoulder extension GTB ( 2 green cords) 3x10  seated thoracic extension over 1/2 roll 15x5"  UT str 30"x2 ea  LS str 30"x2 ea  Supine chin tucks 3"x20      1/2 foam:   Snow angels 20x   Wand flexion 4# +3x10   Serratus punch 4# +3x10    Horizontal abduction +GTB 2x10   pec " stretch 2'    scap retractions 20x  Ulnar nerve glide 10x  Median nerve glide 10x      manual therapy techniques: Soft tissue Mobilization were applied to the: neck for 10 minutes, including:  STM L UT/LS  Cervical distractions  sideglides        Patient Education and Home Exercises     Home Exercises Provided and Patient Education Provided     Education provided:   -     Written Home Exercises Provided: Patient instructed to cont prior HEP. Exercises were reviewed and Christophe was able to demonstrate them prior to the end of the session.  Christophe demonstrated good  understanding of the education provided. See EMR under Patient Instructions for exercises provided during therapy sessions    ASSESSMENT     Pt was reassessed today and has met 3/5 STG with improvements with HEP, postural awareness, and cervical extension. He continues to have some pain/discomfort when he sleeps in a certain position or maintains a seated position for prolonged periods of time. At this time he continues to progress well and would benefit from skilled PT to address deficits.     Pt tolerated today's treatment session well. He may benefit from increase in resistance with rows and shoulder extension at NV. He continues to present with some cervical tightness that improved with manual. PT to consider adding more postural strengthening at NV.     Christophe Is progressing well towards his goals.   Pt prognosis is Good.     Pt will continue to benefit from skilled outpatient physical therapy to address the deficits listed in the problem list box on initial evaluation, provide pt/family education and to maximize pt's level of independence in the home and community environment.     Pt's spiritual, cultural and educational needs considered and pt agreeable to plan of care and goals.     Anticipated barriers to physical therapy: chronicity of condition       Goals: Short Term GOALS: 5 weeks. Pt agrees with goals set. progressing  1. Patient demonstrates  independence with HEP. met  2. Patient demonstrates independence with Postural Awareness. met  3. Patient demonstrates independence with body mechanics.   4. Patient will report pain of </=6/10 at worst, on 0-10 pain scale, with all activity  5. Patient demonstrates increased cervical extension by 5 to 10 to improve tolerance to functional activities pain free.  met        Long Term GOALS: 10 weeks. Pt agrees with goals set. progressing  1. Patient demonstrates all cervical ROM to WFL without pain to improve tolerance to functional activities pain free.   2. Patient demonstrates increased strength BUE's to 5/5 or greater to improve tolerance to functional activities pain free.   3. Patient demonstrates ability to sit for 30 minutes or greater without increase in pain to allow him to complete work tasks without pain  4. Patient will report pain of </=3/10 at worst, on 0-10 pain scale, with all activity  5. Patient demonstrates ability to drive for 1 hour or more without increase in pain to allow him to improve tolerance to functional tasks pain free      PLAN     Plan of care Certification: 5/3/2022 to 8/3/22.     Outpatient Physical Therapy 2 times weekly for 10 weeks to include the following interventions: Cervical/Lumbar Traction, Manual Therapy, Moist Heat/ Ice, Neuromuscular Re-ed, Therapeutic Activities, Therapeutic Exercise and FDN.  Pt may be seen by PTA as part of the rehabilitation team.     Cont PT POC, progress as tolerated    Mira Schwarz, PT,DPT  06/01/2022

## 2022-06-09 ENCOUNTER — LAB VISIT (OUTPATIENT)
Dept: LAB | Facility: HOSPITAL | Age: 52
End: 2022-06-09
Attending: INTERNAL MEDICINE
Payer: COMMERCIAL

## 2022-06-09 ENCOUNTER — CLINICAL SUPPORT (OUTPATIENT)
Dept: REHABILITATION | Facility: HOSPITAL | Age: 52
End: 2022-06-09
Payer: COMMERCIAL

## 2022-06-09 DIAGNOSIS — E11.9 TYPE 2 DIABETES MELLITUS WITHOUT COMPLICATION, WITHOUT LONG-TERM CURRENT USE OF INSULIN: ICD-10-CM

## 2022-06-09 DIAGNOSIS — R29.898 DECREASED ROM OF NECK: ICD-10-CM

## 2022-06-09 DIAGNOSIS — M62.81 DECREASED MUSCLE STRENGTH: Primary | ICD-10-CM

## 2022-06-09 PROCEDURE — 36415 COLL VENOUS BLD VENIPUNCTURE: CPT | Mod: PN | Performed by: INTERNAL MEDICINE

## 2022-06-09 PROCEDURE — 97110 THERAPEUTIC EXERCISES: CPT | Mod: PN,CQ

## 2022-06-09 PROCEDURE — 83036 HEMOGLOBIN GLYCOSYLATED A1C: CPT | Performed by: INTERNAL MEDICINE

## 2022-06-09 NOTE — PROGRESS NOTES
"[facility]   Physical Therapy Treatment Note     Name: Christophe Carroll  Clinic Number: [id number]    Therapy Diagnosis:   Encounter Diagnoses   Name Primary?    Decreased muscle strength Yes    Decreased ROM of neck      Physician: Estrella Bird PA-C    Visit Date: 6/9/2022    Physician: Estrella Bird PA-C     Physician Orders: PT Eval and Treat   Medical Diagnosis from Referral:   M48.02 (ICD-10-CM) - Spinal stenosis, cervical region   M54.12 (ICD-10-CM) - Cervical radiculopathy         Evaluation Date: 5/3/2022  PN Due: 7/1/2022  Authorization Period Expiration: 05/02/2023  Plan of Care Expiration: 8/3/22  Visit # / Visits authorized: 3/ 20 (+eval)      PTA Visit #: 1/5     Time In: 215  Time Out: 255  Total Billable Time: 40 minutes    SUBJECTIVE     Pt reports: Pt states he has been doing great and is working out consistently at home. Pt states he is not longer having pain or N/T down the arm. States occasionally he will have some discomfort in L side neck or slight pain down L upper arm but it does not go past his elbow and it does not last long.   He was compliant with home exercise program.  Response to previous treatment: good, decreased symptoms.   Functional change: none    Pain: 0/10  Location: left neck     OBJECTIVE     Objective Measures updated at progress report unless specified.       Treatment       Christophe received therapeutic exercises to develop ROM, flexibility and posture for 40 minutes including:     UBE 3/3 +lvl 5  +freemotion rows 10# 3x10  +freemotion shoulder extension 10# 3x10  +lat pulldown on squat rack 40# 3x10  +standing D2 flexion RTB 2x10 each   +rhomboid stretch in squat rack 3x30s    +Y's on incline bench 3# 3x10  +T's on incline bench 5# 3x10    rows GTB (2 green cords) 3x10  shoulder extension GTB ( 2 green cords) 3x10  seated thoracic extension over 1/2 roll 15x5"  UT str 30"x2 ea  LS str 30"x2 ea  Supine chin tucks 3"x20        1/2 " foam:   Snow angels 20x   Wand flexion 5# 3x10   Serratus punch 4# 3x10    Horizontal abduction GTB 2x10   pec stretch 2'          manual therapy techniques: Soft tissue Mobilization were applied to the: neck for 00 minutes, including:  STM L UT/LS  Cervical distractions  sideglides        Patient Education and Home Exercises     Home Exercises Provided and Patient Education Provided     Education provided:   -     Written Home Exercises Provided: Patient instructed to cont prior HEP. Exercises were reviewed and Christophe was able to demonstrate them prior to the end of the session.  Christophe demonstrated good  understanding of the education provided. See EMR under Patient Instructions for exercises provided during therapy sessions    ASSESSMENT     Pt arrived to therapy without complaints of pain symptoms therefore progressed strengthening program accordingly. Pt admits increased challenge with progressions and required some postural cues but completed without exacerbation of symptoms. Discussed POC with primary PT and pt and agreed to continue independent strengthening at this time and return to therapy if he is unable to manage independently. PT will place pt's therapy on hold at this time.     Christophe Is progressing well towards his goals.   Pt prognosis is Good.     Pt will continue to benefit from skilled outpatient physical therapy to address the deficits listed in the problem list box on initial evaluation, provide pt/family education and to maximize pt's level of independence in the home and community environment.     Pt's spiritual, cultural and educational needs considered and pt agreeable to plan of care and goals.     Anticipated barriers to physical therapy: chronicity of condition       Goals: Short Term GOALS: 5 weeks. Pt agrees with goals set. progressing  1. Patient demonstrates independence with HEP. met  2. Patient demonstrates independence with Postural Awareness. met  3. Patient demonstrates  independence with body mechanics. met  4. Patient will report pain of </=6/10 at worst, on 0-10 pain scale, with all activity met  5. Patient demonstrates increased cervical extension by 5 to 10 to improve tolerance to functional activities pain free.  met        Long Term GOALS: 10 weeks. Pt agrees with goals set. progressing  1. Patient demonstrates all cervical ROM to WFL without pain to improve tolerance to functional activities pain free.   2. Patient demonstrates increased strength BUE's to 5/5 or greater to improve tolerance to functional activities pain free.   3. Patient demonstrates ability to sit for 30 minutes or greater without increase in pain to allow him to complete work tasks without pain  4. Patient will report pain of </=3/10 at worst, on 0-10 pain scale, with all activity  5. Patient demonstrates ability to drive for 1 hour or more without increase in pain to allow him to improve tolerance to functional tasks pain free      PLAN     Plan of care Certification: 5/3/2022 to 8/3/22.    Cont independent strengthening and return to therapy if unable to manage symptoms    Rad Lee, CAMILLA Lynn, PT, DPT  06/09/2022

## 2022-06-10 LAB
ESTIMATED AVG GLUCOSE: 174 MG/DL (ref 68–131)
HBA1C MFR BLD: 7.7 % (ref 4–5.6)

## 2022-06-13 ENCOUNTER — PATIENT MESSAGE (OUTPATIENT)
Dept: ADMINISTRATIVE | Facility: HOSPITAL | Age: 52
End: 2022-06-13
Payer: COMMERCIAL

## 2022-06-13 ENCOUNTER — PATIENT OUTREACH (OUTPATIENT)
Dept: ADMINISTRATIVE | Facility: HOSPITAL | Age: 52
End: 2022-06-13
Payer: COMMERCIAL

## 2022-06-13 RX ORDER — COVID-19 MOLECULAR TEST ASSAY
KIT MISCELLANEOUS
COMMUNITY
Start: 2022-05-25

## 2022-06-13 NOTE — PROGRESS NOTES
Overdue Colorectal Screening - portal message sent.    Overdue Diabetic eye exam - portal message sent.

## 2022-06-23 ENCOUNTER — HOSPITAL ENCOUNTER (OUTPATIENT)
Dept: RADIOLOGY | Facility: HOSPITAL | Age: 52
Discharge: HOME | End: 2022-06-23
Payer: COMMERCIAL

## 2022-06-23 ENCOUNTER — OFFICE VISIT (OUTPATIENT)
Dept: NEUROSURGERY | Facility: CLINIC | Age: 52
End: 2022-06-23
Payer: COMMERCIAL

## 2022-06-23 DIAGNOSIS — M54.12 CERVICAL RADICULOPATHY: Primary | ICD-10-CM

## 2022-06-23 DIAGNOSIS — M48.02 SPINAL STENOSIS, CERVICAL REGION: ICD-10-CM

## 2022-06-23 DIAGNOSIS — M54.12 CERVICAL RADICULOPATHY: ICD-10-CM

## 2022-06-23 PROCEDURE — 1160F RVW MEDS BY RX/DR IN RCRD: CPT | Mod: CPTII,S$GLB,,

## 2022-06-23 PROCEDURE — 3051F PR MOST RECENT HEMOGLOBIN A1C LEVEL 7.0 - < 8.0%: ICD-10-PCS | Mod: CPTII,S$GLB,,

## 2022-06-23 PROCEDURE — 1160F PR REVIEW ALL MEDS BY PRESCRIBER/CLIN PHARMACIST DOCUMENTED: ICD-10-PCS | Mod: CPTII,S$GLB,,

## 2022-06-23 PROCEDURE — 72141 MRI CERVICAL SPINE WITHOUT CONTRAST: ICD-10-PCS | Mod: 26,,, | Performed by: RADIOLOGY

## 2022-06-23 PROCEDURE — 3060F POS MICROALBUMINURIA REV: CPT | Mod: CPTII,S$GLB,,

## 2022-06-23 PROCEDURE — 3051F HG A1C>EQUAL 7.0%<8.0%: CPT | Mod: CPTII,S$GLB,,

## 2022-06-23 PROCEDURE — 72141 MRI NECK SPINE W/O DYE: CPT | Mod: TC

## 2022-06-23 PROCEDURE — 3066F NEPHROPATHY DOC TX: CPT | Mod: CPTII,S$GLB,,

## 2022-06-23 PROCEDURE — 99215 OFFICE O/P EST HI 40 MIN: CPT | Mod: S$GLB,,,

## 2022-06-23 PROCEDURE — 99999 PR PBB SHADOW E&M-EST. PATIENT-LVL III: ICD-10-PCS | Mod: PBBFAC,,,

## 2022-06-23 PROCEDURE — 1159F MED LIST DOCD IN RCRD: CPT | Mod: CPTII,S$GLB,,

## 2022-06-23 PROCEDURE — 99999 PR PBB SHADOW E&M-EST. PATIENT-LVL III: CPT | Mod: PBBFAC,,,

## 2022-06-23 PROCEDURE — 1159F PR MEDICATION LIST DOCUMENTED IN MEDICAL RECORD: ICD-10-PCS | Mod: CPTII,S$GLB,,

## 2022-06-23 PROCEDURE — 3060F PR POS MICROALBUMINURIA RESULT DOCUMENTED/REVIEW: ICD-10-PCS | Mod: CPTII,S$GLB,,

## 2022-06-23 PROCEDURE — 99215 PR OFFICE/OUTPT VISIT, EST, LEVL V, 40-54 MIN: ICD-10-PCS | Mod: S$GLB,,,

## 2022-06-23 PROCEDURE — 72141 MRI NECK SPINE W/O DYE: CPT | Mod: 26,,, | Performed by: RADIOLOGY

## 2022-06-23 PROCEDURE — 3066F PR DOCUMENTATION OF TREATMENT FOR NEPHROPATHY: ICD-10-PCS | Mod: CPTII,S$GLB,,

## 2022-06-23 NOTE — PROGRESS NOTES
History of Present Illness:  5/02/22: Christophe Carroll is a 51 y.o. male who presents with a primary complaint of left sided neck pain that radiates down his arm. The pain is sharp, aching, sometimes burning and radiates from his neck to his left tricep. He states he first noticed the pain 5 years ago and saw a PMR physician who recommended a procedure. He cannot recall the procedure and states he never went through with it. The pain resolved on its own and returned around October/November of 2021. The pain started off sporadically and is present almost daily now. He also noticed associated intermittent numbness/tingling in his pinky, ring and middle fingers as well as weakness with tricep exercises while working out. He presented to the ED on 4/17 for the pain where he obtained an X-ray and was prescribed Robaxin and Prednisone. He states the medications gave him some relief. He also states that working out has helped with his pain and the pain intensifies whenever he stops exercising for a while. He denies trauma to his neck or left extremity, right upper extremity symptoms, back pain, lower extremity symptoms, troubles walking, dropping objects/difficulties buttoning his shirts or writing, bowel/bladder dysfunction.    Interval history 6/23/22: Patient presents to f/u after completion of MRI Cervical spine imaging. The patient states that since starting PT/OT, as well as the gabapentin, his left cervical radicular pain has improved from an 8/10 almost daily to 2/10, with most of his days 0/10 pain. He still denotes mild weakness when performing tricep exercises and intermittent numbness down his left lateral shoulder and to his pinky. He denies right sided symptoms, lower extremity symptoms, difficulties walking, bowel/bladder dysfunction.       Review of patient's allergies indicates:   Allergen Reactions    Lisinopril Rash    Penicillins Swelling       Current Outpatient Medications:     atorvastatin  (LIPITOR) 10 MG tablet, TAKE 1 TABLET(10 MG) BY MOUTH EVERY DAY, Disp: 90 tablet, Rfl: 0    gabapentin (NEURONTIN) 100 MG capsule, Take 1 capsule (100 mg total) by mouth 2 (two) times daily., Disp: 60 capsule, Rfl: 11    latanoprost 0.005 % ophthalmic solution, , Disp: , Rfl:     metFORMIN (GLUCOPHAGE-XR) 500 MG ER 24hr tablet, Take 2 tablets (1,000 mg total) by mouth 2 (two) times daily with meals., Disp: 360 tablet, Rfl: 1    TRULICITY 3 mg/0.5 mL pen injector, INJECT 3 MG( 0.5 ML) UNDER THE SKIN EVERY 7 DAYS( DISCONTINUE 1.5 MG PRESCRIPTION), Disp: 12 pen, Rfl: 0    aspirin 81 MG Chew, Take 1 tablet (81 mg total) by mouth once daily., Disp: 30 tablet, Rfl: 0    diclofenac sodium (VOLTAREN) 1 % Gel, Apply 2 g topically 4 (four) times daily. Apply to painful area for 10 days (Patient not taking: Reported on 5/2/2022), Disp: 1 Tube, Rfl: 0    FLUZONE QUAD 1688-2234, PF, 60 mcg (15 mcg x 4)/0.5 mL Syrg, , Disp: , Rfl:     ID NOW COVID-19 TEST KIT Kit, TEST AS DIRECTED TODAY, Disp: , Rfl:     methocarbamoL (ROBAXIN) 500 MG Tab, Take 1 tablet (500 mg total) by mouth 4 (four) times daily. (Patient not taking: Reported on 6/23/2022), Disp: 60 tablet, Rfl: 0    predniSONE (DELTASONE) 1 MG tablet, Take 1 mg by mouth once daily., Disp: , Rfl:     sars-cov-2, covid-19, (MODERNA COVID-19) 100 mcg/0.5 ml injection, , Disp: , Rfl:     sildenafil citrate (VIAGRA ORAL), CHEW 1 TO 2 TABLETS BY MOUTH 30 TO 45 MINUTES BEFORE SEXUAL INTERCOURSE, Disp: , Rfl:     TRAVATAN Z 0.004 % Drop, , Disp: , Rfl: 6    Current Facility-Administered Medications:     acetaminophen tablet 650 mg, 650 mg, Oral, Once PRN, Vargas Berger MD    albuterol inhaler 2 puff, 2 puff, Inhalation, Q20 Min PRN, Vargas Berger MD    diphenhydrAMINE injection 25 mg, 25 mg, Intravenous, Once PRN, Vargas Berger MD    EPINEPHrine (EPIPEN) 0.3 mg/0.3 mL pen injection 0.3 mg, 0.3 mg, Intramuscular, PRN, Vargas Berger MD     methylPREDNISolone sodium succinate injection 40 mg, 40 mg, Intravenous, Once PRN, Vargas Berger MD    ondansetron disintegrating tablet 4 mg, 4 mg, Oral, Once PRN, Vargas Berger MD    sodium chloride 0.9% 500 mL flush bag, , Intravenous, PRN, Vargas Berger MD    sodium chloride 0.9% flush 10 mL, 10 mL, Intravenous, PRN, Vargas Berger MD     Past Medical History:   Diagnosis Date    Diabetes mellitus type II     Hypercholesteremia      Past Surgical History:   Procedure Laterality Date    removal of epididymal cyst       Family History   Problem Relation Age of Onset    Diabetes Mother     Cancer Mother         lung with mets to brain    Diabetes Maternal Aunt     Kidney disease Maternal Aunt     Diabetes Maternal Grandmother      Social History     Tobacco Use    Smoking status: Never Smoker    Smokeless tobacco: Never Used   Substance Use Topics    Alcohol use: Yes     Alcohol/week: 0.0 standard drinks     Comment: occasional    Drug use: No       Review of Systems:  Constitutional: No fever or chills. No fatigue.  Eyes: No visual changes.  ENT: No nasal congestion or sore throat. No trouble swallowing.  Respiratory: No cough or shortness of breath.  Cardiovascular: No chest pain or palpitations.  Gastrointestinal: No nausea or vomiting, tolerating diet.  Genitourinary: No hematuria or dysuria.  Skin: No rash or pruritis.  Hematologic/Lymphatic: No easy bruising or lymphadenopathy.  Musculoskeletal: No arthralgias, myalgias or weakness.  Neurological: No seizures or tremors. No dizziness.  Behavioral/Psych: No auditory or visual hallucinations. No SI/HI.  Endocrine: No heat or cold intolerance.    OBJECTIVE:     Vital Signs (Most Recent):       Physical Exam:  General: Well developed, well nourished, no distress.  Head: Normocephalic, atraumatic.  Neurologic: Alert and oriented. Thought content appropriate  GCS: Motor: 6/Verbal: 5/Eyes: 4 GCS Total: 15  Mental Status: Awake,  Alert, Oriented x 4.  Language: No aphasia.  Speech: No dysarthria.  Cranial nerves: Face symmetric, tongue midline, CN II-XII grossly intact.   Eyes: Pupils equal, round, reactive to light with accommodation, EOMI. Unable to appreciate optic disc. Red reflex intact bilaterally.   Pulmonary: Normal respirations, not labored, no accessory muscles used.  Abdomen: Soft, non-distended, not tender to palpation.  Sensory: Intact to light touch throughout.  Motor Strength: Moves all extremities spontaneously with good tone.  Full strength upper and lower extremities. No abnormal movements seen.     Strength  Deltoids Triceps Biceps Wrist Extension Wrist Flexion Hand    Upper: R 5/5 5/5 5/5 5/5 5/5 5/5    L 5/5 5/5 5/5 5/5 5/5 5/5     Iliopsoas Quadriceps Knee  Flexion Tibialis  anterior Gastro- cnemius EHL   Lower: R 5/5 5/5 5/5 5/5 5/5 5/5    L 5/5 5/5 5/5 5/5 5/5 5/5     Gómez: Absent.  Clonus: Absent.  Vascular: Pulses 2+ and symmetric radial and dorsalis pedis. No LE edema or skin changes.  Skin: Warm, dry and intact, no rashes.    Gait: Normal.    Tandem Gait: No difficulty.  Cervical ROM: Full.     Laboratory:  I have reviewed all pertinent lab results within the past 24 hours.    Diagnostic Results:  MRI Cervical Spine w/o contrast 6/23/22 showing multilevel degenerative changes with severe neural foraminal narrowing at C4/5 and C6/7 on the left and at C5/6 on the righ due to posterior disc osteophyte complexes and uncovertberal arthropathy.     ASSESSMENT/PLAN:     Patient's radicular symptoms likely due to neural foraminal narrowing at C4/5, C6/7 on the left. Given significant improvement in symptoms with PT/OT and gabapentin, patient would like to continue this regimen at this time. Advised patient to call our office if he notices any progression of symptoms or new neurological deficits. Follow up as needed.     All symptoms and signs that require emergent or urgent treatment were reviewed. The plan was  discussed with the patient. All of the the patient's questions and concerns were answered and he voiced understanding. Please feel free to call with any further questions.     Estrella Bird PA-C  Neurosurgery   [facility]

## 2022-06-26 DIAGNOSIS — E11.69 HYPERLIPIDEMIA ASSOCIATED WITH TYPE 2 DIABETES MELLITUS: Chronic | ICD-10-CM

## 2022-06-26 DIAGNOSIS — E78.5 HYPERLIPIDEMIA ASSOCIATED WITH TYPE 2 DIABETES MELLITUS: Chronic | ICD-10-CM

## 2022-06-27 DIAGNOSIS — E11.9 TYPE 2 DIABETES MELLITUS WITHOUT COMPLICATION, WITHOUT LONG-TERM CURRENT USE OF INSULIN: Chronic | ICD-10-CM

## 2022-06-27 RX ORDER — ATORVASTATIN CALCIUM 10 MG/1
TABLET, FILM COATED ORAL
Qty: 90 TABLET | Refills: 0 | Status: SHIPPED | OUTPATIENT
Start: 2022-06-27 | End: 2022-09-20

## 2022-06-27 NOTE — TELEPHONE ENCOUNTER
Care Due:                  Date            Visit Type   Department     Provider  --------------------------------------------------------------------------------                                EP -         [facility]                              PRIMARY      MED/ INTERNAL  Last Visit: 04-      CARE ([facility])   MED/ PEDS      Moris Menchaca  Next Visit: None Scheduled  None         None Found                                                            Last  Test          Frequency    Reason                     Performed    Due Date  --------------------------------------------------------------------------------    Office Visit  12 months..  TRULICITY, atorvastatin,   04- 03-                             metFORMIN................    CMP.........  12 months..  atorvastatin, metFORMIN..  07- 06-    Lipid Panel.  12 months..  atorvastatin.............  07- 06-    [employer] Embedded Care Gaps. Reference number: [id number]. 6/26/2022   10:08:25 PM CDT

## 2022-06-27 NOTE — TELEPHONE ENCOUNTER
Refill approved.  Due for routine physical in Sept with labs prior.  Please sched with me or Aaliyah    Thank you,  Nathanael

## 2022-06-27 NOTE — TELEPHONE ENCOUNTER
Refill Routing Note   Medication(s) are not appropriate for processing by [facility] for the following reason(s):      - Patient has not been seen in over 15 months by PCP  - Required laboratory values are outdated  - Patient has been seen in the ED/Hospital since the last PCP visit    [facility] action(s):  Defer          Medication reconciliation completed: No     Appointments  past 12m or future 3m with PCP    Date Provider   Last Visit   4/1/2021 Moris Menchaca MD   Next Visit   Visit date not found Moris Menchaca MD   ED visits in past 90 days: 1        Note composed:8:01 AM 06/27/2022

## 2022-06-28 RX ORDER — DULAGLUTIDE 3 MG/.5ML
INJECTION, SOLUTION SUBCUTANEOUS
OUTPATIENT
Start: 2022-06-28

## 2022-06-28 RX ORDER — METFORMIN HYDROCHLORIDE 500 MG/1
TABLET, EXTENDED RELEASE ORAL
Qty: 360 TABLET | Refills: 1 | OUTPATIENT
Start: 2022-06-28

## 2022-06-28 NOTE — TELEPHONE ENCOUNTER
No new care gaps identified.  [facility] Embedded Care Gaps. Reference number: [id number]. 6/28/2022   11:51:33 AM RACHELLT

## 2022-06-28 NOTE — TELEPHONE ENCOUNTER
Refill Routing Note   Medication(s) are not appropriate for processing by [facility] for the following reason(s):      - Patient has not been seen in over 15 months by PCP  - Required laboratory values are outdated    [facility] action(s):  Defer Medication-related problems identified:   Requires appointment  Requires labs        Medication reconciliation completed: No     Appointments  past 12m or future 3m with PCP    Date Provider   Last Visit   4/1/2021 Moris Menchaca MD   Next Visit   Visit date not found Moris Menchaca MD   ED visits in past 90 days: 1        Note composed:12:04 PM 06/28/2022

## 2022-06-29 DIAGNOSIS — E11.9 TYPE 2 DIABETES MELLITUS WITHOUT COMPLICATION, WITHOUT LONG-TERM CURRENT USE OF INSULIN: Chronic | ICD-10-CM

## 2022-06-29 RX ORDER — METFORMIN HYDROCHLORIDE 500 MG/1
1000 TABLET, EXTENDED RELEASE ORAL 2 TIMES DAILY WITH MEALS
Qty: 360 TABLET | Refills: 0 | Status: SHIPPED | OUTPATIENT
Start: 2022-06-29 | End: 2022-11-09

## 2022-06-29 NOTE — TELEPHONE ENCOUNTER
----- Message from Barbie Garay sent at 6/29/2022 12:53 PM CDT -----  Type: RX Refill Request    Who Called:  self     Have you contacted your pharmacy: yes    Refill or New Rx: refill    RX Name and Strength:metFORMIN (GLUCOPHAGE-XR) 500 MG ER 24hr tablet  TRULICITY 3 mg/0.5 mL pen injector    Preferred Pharmacy with phone number: [id number]  [place], [place] - [address]   Phone:  [phone]  Fax:  [phone]    Local or Mail Order: Local    Would the patient rather a call back or a response via [facility]? call    Best Call Back Number: .[phone] (home)             
No new care gaps identified.  [facility] Embedded Care Gaps. Reference number: [id number]. 6/29/2022   1:03:32 PM CDT  
negative...

## 2022-08-22 ENCOUNTER — OFFICE VISIT (OUTPATIENT)
Dept: FAMILY MEDICINE | Facility: CLINIC | Age: 52
End: 2022-08-22
Payer: COMMERCIAL

## 2022-08-22 VITALS
WEIGHT: 222 LBS | DIASTOLIC BLOOD PRESSURE: 64 MMHG | BODY MASS INDEX: 31.08 KG/M2 | HEIGHT: 71 IN | TEMPERATURE: 98 F | HEART RATE: 87 BPM | OXYGEN SATURATION: 96 % | SYSTOLIC BLOOD PRESSURE: 118 MMHG

## 2022-08-22 DIAGNOSIS — R22.0 FACIAL SWELLING: ICD-10-CM

## 2022-08-22 DIAGNOSIS — Z23 NEED FOR PNEUMOCOCCAL VACCINE: Primary | ICD-10-CM

## 2022-08-22 DIAGNOSIS — L23.7 ALLERGIC CONTACT DERMATITIS DUE TO URUSHIOL FROM EASTERN POISON IVY: ICD-10-CM

## 2022-08-22 PROCEDURE — 3051F PR MOST RECENT HEMOGLOBIN A1C LEVEL 7.0 - < 8.0%: ICD-10-PCS | Mod: CPTII,S$GLB,, | Performed by: STUDENT IN AN ORGANIZED HEALTH CARE EDUCATION/TRAINING PROGRAM

## 2022-08-22 PROCEDURE — 3051F HG A1C>EQUAL 7.0%<8.0%: CPT | Mod: CPTII,S$GLB,, | Performed by: STUDENT IN AN ORGANIZED HEALTH CARE EDUCATION/TRAINING PROGRAM

## 2022-08-22 PROCEDURE — 99999 PR PBB SHADOW E&M-EST. PATIENT-LVL III: ICD-10-PCS | Mod: PBBFAC,,, | Performed by: STUDENT IN AN ORGANIZED HEALTH CARE EDUCATION/TRAINING PROGRAM

## 2022-08-22 PROCEDURE — 96372 PR INJECTION,THERAP/PROPH/DIAG2ST, IM OR SUBCUT: ICD-10-PCS | Mod: S$GLB,,, | Performed by: STUDENT IN AN ORGANIZED HEALTH CARE EDUCATION/TRAINING PROGRAM

## 2022-08-22 PROCEDURE — 3060F POS MICROALBUMINURIA REV: CPT | Mod: CPTII,S$GLB,, | Performed by: STUDENT IN AN ORGANIZED HEALTH CARE EDUCATION/TRAINING PROGRAM

## 2022-08-22 PROCEDURE — 3066F NEPHROPATHY DOC TX: CPT | Mod: CPTII,S$GLB,, | Performed by: STUDENT IN AN ORGANIZED HEALTH CARE EDUCATION/TRAINING PROGRAM

## 2022-08-22 PROCEDURE — 3066F PR DOCUMENTATION OF TREATMENT FOR NEPHROPATHY: ICD-10-PCS | Mod: CPTII,S$GLB,, | Performed by: STUDENT IN AN ORGANIZED HEALTH CARE EDUCATION/TRAINING PROGRAM

## 2022-08-22 PROCEDURE — 96372 THER/PROPH/DIAG INJ SC/IM: CPT | Mod: S$GLB,,, | Performed by: STUDENT IN AN ORGANIZED HEALTH CARE EDUCATION/TRAINING PROGRAM

## 2022-08-22 PROCEDURE — 90471 IMMUNIZATION ADMIN: CPT | Mod: 59,S$GLB,, | Performed by: STUDENT IN AN ORGANIZED HEALTH CARE EDUCATION/TRAINING PROGRAM

## 2022-08-22 PROCEDURE — 99999 PR PBB SHADOW E&M-EST. PATIENT-LVL III: CPT | Mod: PBBFAC,,, | Performed by: STUDENT IN AN ORGANIZED HEALTH CARE EDUCATION/TRAINING PROGRAM

## 2022-08-22 PROCEDURE — 90677 PCV20 VACCINE IM: CPT | Mod: S$GLB,,, | Performed by: STUDENT IN AN ORGANIZED HEALTH CARE EDUCATION/TRAINING PROGRAM

## 2022-08-22 PROCEDURE — 3060F PR POS MICROALBUMINURIA RESULT DOCUMENTED/REVIEW: ICD-10-PCS | Mod: CPTII,S$GLB,, | Performed by: STUDENT IN AN ORGANIZED HEALTH CARE EDUCATION/TRAINING PROGRAM

## 2022-08-22 PROCEDURE — 90677 PNEUMOCOCCAL CONJUGATE VACCINE 20-VALENT: ICD-10-PCS | Mod: S$GLB,,, | Performed by: STUDENT IN AN ORGANIZED HEALTH CARE EDUCATION/TRAINING PROGRAM

## 2022-08-22 PROCEDURE — 99213 PR OFFICE/OUTPT VISIT, EST, LEVL III, 20-29 MIN: ICD-10-PCS | Mod: 25,S$GLB,, | Performed by: STUDENT IN AN ORGANIZED HEALTH CARE EDUCATION/TRAINING PROGRAM

## 2022-08-22 PROCEDURE — 90471 PNEUMOCOCCAL CONJUGATE VACCINE 20-VALENT: ICD-10-PCS | Mod: 59,S$GLB,, | Performed by: STUDENT IN AN ORGANIZED HEALTH CARE EDUCATION/TRAINING PROGRAM

## 2022-08-22 PROCEDURE — 3008F PR BODY MASS INDEX (BMI) DOCUMENTED: ICD-10-PCS | Mod: CPTII,S$GLB,, | Performed by: STUDENT IN AN ORGANIZED HEALTH CARE EDUCATION/TRAINING PROGRAM

## 2022-08-22 PROCEDURE — 3008F BODY MASS INDEX DOCD: CPT | Mod: CPTII,S$GLB,, | Performed by: STUDENT IN AN ORGANIZED HEALTH CARE EDUCATION/TRAINING PROGRAM

## 2022-08-22 PROCEDURE — 99213 OFFICE O/P EST LOW 20 MIN: CPT | Mod: 25,S$GLB,, | Performed by: STUDENT IN AN ORGANIZED HEALTH CARE EDUCATION/TRAINING PROGRAM

## 2022-08-22 RX ORDER — ZOSTER VACCINE RECOMBINANT, ADJUVANTED 50 MCG/0.5
KIT INTRAMUSCULAR
COMMUNITY
Start: 2022-05-01

## 2022-08-22 RX ORDER — TRIAMCINOLONE ACETONIDE 5 MG/G
CREAM TOPICAL 2 TIMES DAILY
Qty: 15 G | Refills: 1 | Status: SHIPPED | OUTPATIENT
Start: 2022-08-22 | End: 2023-06-05

## 2022-08-22 RX ORDER — PREDNISONE 20 MG/1
40 TABLET ORAL DAILY
Qty: 14 TABLET | Refills: 0 | Status: SHIPPED | OUTPATIENT
Start: 2022-08-22 | End: 2022-08-29

## 2022-08-22 NOTE — PROGRESS NOTES
08/22/2022    Christophe [place]  [id number]    Chief Complaint   Patient presents with    Poison Ivy       HPI    2 days ago was cutting down tree and thinks he was touching poison oak. Has taken multiple doses of benadryl but it hasn't made difference    Negative 10 point ROS outside of HPI    Social History     Socioeconomic History    Marital status:    Occupational History     Employer: [employer]   Tobacco Use    Smoking status: Never Smoker    Smokeless tobacco: Never Used   Substance and Sexual Activity    Alcohol use: Yes     Alcohol/week: 0.0 standard drinks     Comment: occasional    Drug use: No    Sexual activity: Yes     Partners: Female     Comment:            Current Outpatient Medications:     atorvastatin (LIPITOR) 10 MG tablet, TAKE 1 TABLET(10 MG) BY MOUTH EVERY DAY, Disp: 90 tablet, Rfl: 0    dulaglutide (TRULICITY) 3 mg/0.5 mL pen injector, Inject 3 mg into the skin every 7 days., Disp: 12 pen, Rfl: 0    FLUZONE QUAD 0976-0027, PF, 60 mcg (15 mcg x 4)/0.5 mL Syrg, , Disp: , Rfl:     gabapentin (NEURONTIN) 100 MG capsule, Take 1 capsule (100 mg total) by mouth 2 (two) times daily., Disp: 60 capsule, Rfl: 11    ID NOW COVID-19 TEST KIT Kit, TEST AS DIRECTED TODAY, Disp: , Rfl:     latanoprost 0.005 % ophthalmic solution, , Disp: , Rfl:     metFORMIN (GLUCOPHAGE-XR) 500 MG ER 24hr tablet, Take 2 tablets (1,000 mg total) by mouth 2 (two) times daily with meals., Disp: 360 tablet, Rfl: 0    predniSONE (DELTASONE) 1 MG tablet, Take 1 mg by mouth once daily., Disp: , Rfl:     sars-cov-2, covid-19, (MODERNA COVID-19) 100 mcg/0.5 ml injection, , Disp: , Rfl:     SHINGRIX, PF, 50 mcg/0.5 mL injection, , Disp: , Rfl:     sildenafil citrate (VIAGRA ORAL), CHEW 1 TO 2 TABLETS BY MOUTH 30 TO 45 MINUTES BEFORE SEXUAL INTERCOURSE, Disp: , Rfl:     TRAVATAN Z 0.004 % Drop, , Disp: , Rfl: 6    aspirin 81 MG Chew, Take 1 tablet (81 mg total) by mouth once daily., Disp: 30  tablet, Rfl: 0    diclofenac sodium (VOLTAREN) 1 % Gel, Apply 2 g topically 4 (four) times daily. Apply to painful area for 10 days (Patient not taking: Reported on 5/2/2022), Disp: 1 Tube, Rfl: 0    methocarbamoL (ROBAXIN) 500 MG Tab, Take 1 tablet (500 mg total) by mouth 4 (four) times daily. (Patient not taking: No sig reported), Disp: 60 tablet, Rfl: 0    Current Facility-Administered Medications:     acetaminophen tablet 650 mg, 650 mg, Oral, Once PRN, Vargas Berger MD    albuterol inhaler 2 puff, 2 puff, Inhalation, Q20 Min PRN, Vargas Berger MD    diphenhydrAMINE injection 25 mg, 25 mg, Intravenous, Once PRN, Vargas Berger MD    EPINEPHrine (EPIPEN) 0.3 mg/0.3 mL pen injection 0.3 mg, 0.3 mg, Intramuscular, PRN, Vargas Berger MD    methylPREDNISolone sodium succinate injection 40 mg, 40 mg, Intravenous, Once PRN, Vargas Berger MD    ondansetron disintegrating tablet 4 mg, 4 mg, Oral, Once PRN, Vargas Berger MD    sodium chloride 0.9% 500 mL flush bag, , Intravenous, PRN, Vargas Berger MD    sodium chloride 0.9% flush 10 mL, 10 mL, Intravenous, PRN, Vargas Berger MD      Physical Exam  Vitals:    08/22/22 0954   BP: 118/64   Pulse: 87   Temp: 98 °F (36.7 °C)       Physical Exam  HENT:      Head:        Mouth/Throat:      Pharynx: Oropharynx is clear.         Highlighted area with moderate swelling.   Neck and chest with E\erythematous, vesicular eruption     1. Need for pneumococcal vaccine  - (In Office Administered) Pneumococcal Conjugate Vaccine (20 Valent) (IM)    2. Allergic contact dermatitis due to urushiol from [place] poison ivy  - methylPREDNISolone sod suc(PF) injection 125 mg  - predniSONE (DELTASONE) 20 MG tablet; Take 2 tablets (40 mg total) by mouth once daily. for 7 days  Dispense: 14 tablet; Refill: 0  - triamcinolone acetonide 0.5% (KENALOG) 0.5 % Crea; Apply topically 2 (two) times daily.  Dispense: 15 g; Refill: 1    3. Facial swelling  -  methylPREDNISolone sod suc(PF) injection 125 mg      RTC if no improvement.    Yasmine Waters MD  Family Medicine

## 2022-08-22 NOTE — PROGRESS NOTES
Patient given [id number] vaccine via injection. Also given solu-med 125mg via injectio to L Gluteal site. 0 complaints of, tolerated well. VIS given for pneumonia & advised to wait in lobby 15 mins for monitoring. Verbalized understanding.

## 2022-08-28 ENCOUNTER — PATIENT MESSAGE (OUTPATIENT)
Dept: FAMILY MEDICINE | Facility: CLINIC | Age: 52
End: 2022-08-28
Payer: COMMERCIAL

## 2022-08-29 RX ORDER — PREDNISONE 20 MG/1
20 TABLET ORAL DAILY
Status: CANCELLED | OUTPATIENT
Start: 2022-08-29

## 2022-08-29 NOTE — TELEPHONE ENCOUNTER
Spoke with patient and he stated that his lip and mouth is not swelling anymore, however, his hands and neck is worse. He is out of the pills that you prescribe and the cream is low. Please refill both medications

## 2022-09-20 DIAGNOSIS — E78.5 HYPERLIPIDEMIA ASSOCIATED WITH TYPE 2 DIABETES MELLITUS: Chronic | ICD-10-CM

## 2022-09-20 DIAGNOSIS — E11.69 HYPERLIPIDEMIA ASSOCIATED WITH TYPE 2 DIABETES MELLITUS: Chronic | ICD-10-CM

## 2022-09-20 RX ORDER — ATORVASTATIN CALCIUM 10 MG/1
TABLET, FILM COATED ORAL
Qty: 90 TABLET | Refills: 0 | Status: SHIPPED | OUTPATIENT
Start: 2022-09-20 | End: 2023-01-22

## 2022-09-20 NOTE — TELEPHONE ENCOUNTER
Care Due:                  Date            Visit Type   Department     Provider  --------------------------------------------------------------------------------                                [facility]                              PRIMARY      MED/ INTERNAL  Last Visit: 04-      CARE ([facility])   MED/ PEDS      Moris Menchaca  Next Visit: None Scheduled  None         None Found                                                            Last  Test          Frequency    Reason                     Performed    Due Date  --------------------------------------------------------------------------------    Office Visit  12 months..  atorvastatin,              04- 03-                             dulaglutide, metFORMIN...    CMP.........  12 months..  atorvastatin, metFORMIN..  07- 06-    HBA1C.......  6 months...  dulaglutide, metFORMIN...  06-   12-    Lipid Panel.  12 months..  atorvastatin.............  07- 06-    [facility] Embedded Care Gaps. Reference number: [id number]. 9/20/2022   11:33:53 AM CDT

## 2022-09-20 NOTE — TELEPHONE ENCOUNTER
Refill approved.  Due for fulls et of labs prior to any additional refills  Please sched labs and virtual follow up with me or Aaliyah.     Thank you,  Nathanael

## 2022-09-20 NOTE — TELEPHONE ENCOUNTER
Refill Routing Note   Medication(s) are not appropriate for processing by [facility] for the following reason(s):      - Patient has not been seen in over 15 months by PCP  - Required laboratory values are outdated    [facility] action(s):  Defer Medication-related problems identified:   Requires labs  Requires appointment        Medication reconciliation completed: No     Appointments  past 12m or future 3m with PCP    Date Provider   Last Visit   4/1/2021 Moris Menchaca MD   Next Visit   Visit date not found Moris Menchaca MD   ED visits in past 90 days: 0        Note composed:12:44 PM 09/20/2022

## 2022-10-03 ENCOUNTER — PATIENT MESSAGE (OUTPATIENT)
Dept: ADMINISTRATIVE | Facility: HOSPITAL | Age: 52
End: 2022-10-03
Payer: COMMERCIAL

## 2022-11-02 ENCOUNTER — LAB VISIT (OUTPATIENT)
Dept: LAB | Facility: HOSPITAL | Age: 52
End: 2022-11-02
Attending: INTERNAL MEDICINE
Payer: COMMERCIAL

## 2022-11-02 DIAGNOSIS — E11.9 TYPE 2 DIABETES MELLITUS WITHOUT COMPLICATION, WITHOUT LONG-TERM CURRENT USE OF INSULIN: ICD-10-CM

## 2022-11-02 LAB
ALBUMIN/CREAT UR: 3.8 UG/MG (ref 0–30)
CREAT UR-MCNC: 287 MG/DL (ref 23–375)
ESTIMATED AVG GLUCOSE: 194 MG/DL (ref 68–131)
HBA1C MFR BLD: 8.4 % (ref 4–5.6)
MICROALBUMIN UR DL<=1MG/L-MCNC: 11 UG/ML

## 2022-11-02 PROCEDURE — 36415 COLL VENOUS BLD VENIPUNCTURE: CPT | Mod: PO | Performed by: INTERNAL MEDICINE

## 2022-11-02 PROCEDURE — 82043 UR ALBUMIN QUANTITATIVE: CPT | Performed by: INTERNAL MEDICINE

## 2022-11-02 PROCEDURE — 83036 HEMOGLOBIN GLYCOSYLATED A1C: CPT | Performed by: INTERNAL MEDICINE

## 2022-11-02 PROCEDURE — 82570 ASSAY OF URINE CREATININE: CPT | Performed by: INTERNAL MEDICINE

## 2022-11-25 ENCOUNTER — OFFICE VISIT (OUTPATIENT)
Dept: FAMILY MEDICINE | Facility: CLINIC | Age: 52
End: 2022-11-25
Payer: COMMERCIAL

## 2022-11-25 VITALS
DIASTOLIC BLOOD PRESSURE: 68 MMHG | HEART RATE: 87 BPM | OXYGEN SATURATION: 97 % | BODY MASS INDEX: 30.66 KG/M2 | WEIGHT: 219 LBS | SYSTOLIC BLOOD PRESSURE: 92 MMHG | HEIGHT: 71 IN | TEMPERATURE: 98 F

## 2022-11-25 DIAGNOSIS — Z23 NEEDS FLU SHOT: Primary | ICD-10-CM

## 2022-11-25 DIAGNOSIS — M54.50 ACUTE RIGHT-SIDED LOW BACK PAIN WITHOUT SCIATICA: ICD-10-CM

## 2022-11-25 DIAGNOSIS — E11.65 TYPE 2 DIABETES MELLITUS WITH HYPERGLYCEMIA, WITHOUT LONG-TERM CURRENT USE OF INSULIN: ICD-10-CM

## 2022-11-25 PROCEDURE — 3074F PR MOST RECENT SYSTOLIC BLOOD PRESSURE < 130 MM HG: ICD-10-PCS | Mod: CPTII,S$GLB,, | Performed by: STUDENT IN AN ORGANIZED HEALTH CARE EDUCATION/TRAINING PROGRAM

## 2022-11-25 PROCEDURE — 99999 PR PBB SHADOW E&M-EST. PATIENT-LVL V: CPT | Mod: PBBFAC,,, | Performed by: STUDENT IN AN ORGANIZED HEALTH CARE EDUCATION/TRAINING PROGRAM

## 2022-11-25 PROCEDURE — 99214 PR OFFICE/OUTPT VISIT, EST, LEVL IV, 30-39 MIN: ICD-10-PCS | Mod: 25,S$GLB,, | Performed by: STUDENT IN AN ORGANIZED HEALTH CARE EDUCATION/TRAINING PROGRAM

## 2022-11-25 PROCEDURE — 90686 IIV4 VACC NO PRSV 0.5 ML IM: CPT | Mod: S$GLB,,, | Performed by: STUDENT IN AN ORGANIZED HEALTH CARE EDUCATION/TRAINING PROGRAM

## 2022-11-25 PROCEDURE — 1159F PR MEDICATION LIST DOCUMENTED IN MEDICAL RECORD: ICD-10-PCS | Mod: CPTII,S$GLB,, | Performed by: STUDENT IN AN ORGANIZED HEALTH CARE EDUCATION/TRAINING PROGRAM

## 2022-11-25 PROCEDURE — 3078F PR MOST RECENT DIASTOLIC BLOOD PRESSURE < 80 MM HG: ICD-10-PCS | Mod: CPTII,S$GLB,, | Performed by: STUDENT IN AN ORGANIZED HEALTH CARE EDUCATION/TRAINING PROGRAM

## 2022-11-25 PROCEDURE — 90686 FLU VACCINE (QUAD) GREATER THAN OR EQUAL TO 3YO PRESERVATIVE FREE IM: ICD-10-PCS | Mod: S$GLB,,, | Performed by: STUDENT IN AN ORGANIZED HEALTH CARE EDUCATION/TRAINING PROGRAM

## 2022-11-25 PROCEDURE — 3008F BODY MASS INDEX DOCD: CPT | Mod: CPTII,S$GLB,, | Performed by: STUDENT IN AN ORGANIZED HEALTH CARE EDUCATION/TRAINING PROGRAM

## 2022-11-25 PROCEDURE — 90471 IMMUNIZATION ADMIN: CPT | Mod: S$GLB,,, | Performed by: STUDENT IN AN ORGANIZED HEALTH CARE EDUCATION/TRAINING PROGRAM

## 2022-11-25 PROCEDURE — 3008F PR BODY MASS INDEX (BMI) DOCUMENTED: ICD-10-PCS | Mod: CPTII,S$GLB,, | Performed by: STUDENT IN AN ORGANIZED HEALTH CARE EDUCATION/TRAINING PROGRAM

## 2022-11-25 PROCEDURE — 99999 PR PBB SHADOW E&M-EST. PATIENT-LVL V: ICD-10-PCS | Mod: PBBFAC,,, | Performed by: STUDENT IN AN ORGANIZED HEALTH CARE EDUCATION/TRAINING PROGRAM

## 2022-11-25 PROCEDURE — 90471 FLU VACCINE (QUAD) GREATER THAN OR EQUAL TO 3YO PRESERVATIVE FREE IM: ICD-10-PCS | Mod: S$GLB,,, | Performed by: STUDENT IN AN ORGANIZED HEALTH CARE EDUCATION/TRAINING PROGRAM

## 2022-11-25 PROCEDURE — 3061F NEG MICROALBUMINURIA REV: CPT | Mod: CPTII,S$GLB,, | Performed by: STUDENT IN AN ORGANIZED HEALTH CARE EDUCATION/TRAINING PROGRAM

## 2022-11-25 PROCEDURE — 3078F DIAST BP <80 MM HG: CPT | Mod: CPTII,S$GLB,, | Performed by: STUDENT IN AN ORGANIZED HEALTH CARE EDUCATION/TRAINING PROGRAM

## 2022-11-25 PROCEDURE — 1159F MED LIST DOCD IN RCRD: CPT | Mod: CPTII,S$GLB,, | Performed by: STUDENT IN AN ORGANIZED HEALTH CARE EDUCATION/TRAINING PROGRAM

## 2022-11-25 PROCEDURE — 3074F SYST BP LT 130 MM HG: CPT | Mod: CPTII,S$GLB,, | Performed by: STUDENT IN AN ORGANIZED HEALTH CARE EDUCATION/TRAINING PROGRAM

## 2022-11-25 PROCEDURE — 99214 OFFICE O/P EST MOD 30 MIN: CPT | Mod: 25,S$GLB,, | Performed by: STUDENT IN AN ORGANIZED HEALTH CARE EDUCATION/TRAINING PROGRAM

## 2022-11-25 PROCEDURE — 3066F PR DOCUMENTATION OF TREATMENT FOR NEPHROPATHY: ICD-10-PCS | Mod: CPTII,S$GLB,, | Performed by: STUDENT IN AN ORGANIZED HEALTH CARE EDUCATION/TRAINING PROGRAM

## 2022-11-25 PROCEDURE — 3052F HG A1C>EQUAL 8.0%<EQUAL 9.0%: CPT | Mod: CPTII,S$GLB,, | Performed by: STUDENT IN AN ORGANIZED HEALTH CARE EDUCATION/TRAINING PROGRAM

## 2022-11-25 PROCEDURE — 3066F NEPHROPATHY DOC TX: CPT | Mod: CPTII,S$GLB,, | Performed by: STUDENT IN AN ORGANIZED HEALTH CARE EDUCATION/TRAINING PROGRAM

## 2022-11-25 PROCEDURE — 3061F PR NEG MICROALBUMINURIA RESULT DOCUMENTED/REVIEW: ICD-10-PCS | Mod: CPTII,S$GLB,, | Performed by: STUDENT IN AN ORGANIZED HEALTH CARE EDUCATION/TRAINING PROGRAM

## 2022-11-25 PROCEDURE — 3052F PR MOST RECENT HEMOGLOBIN A1C LEVEL 8.0 - < 9.0%: ICD-10-PCS | Mod: CPTII,S$GLB,, | Performed by: STUDENT IN AN ORGANIZED HEALTH CARE EDUCATION/TRAINING PROGRAM

## 2022-11-25 NOTE — PROGRESS NOTES
"Chief Complaint  Chief Complaint   Patient presents with    Back Pain     Right side lower back pain        HPI  Christophe Carroll is a 52 y.o. male with multiple medical diagnoses as listed in the medical history and problem list that presents for R side lower back pain.  Their last appointment with primary care was 8/22/2022.      Mr. Carroll first noticed the pain two weeks ago. The sharp concentrated pain ranges from 1/10 to 7-8/10 in severity and does not radiate anywhere. Mr. Carroll feels that his back is "tight" and that sleeping on his side and prolonged sitting make the pain worse. Additionally, the Pt feels that he has not been exercising or stretching as much as he has in the past. Mr. Carroll has an eight year old mattress and often slouches forward while at work. The pain is slightly relieved by stretching the R hamstring. Mr. Carroll has not tried any ibuprofen, tylenol, heat, or ice. He has not had any fevers, weight loss, or personal history of cancer. However, he has recently taken corticosteroids for a recent poison ivy infection.    In regards to his type 2 diabetes, patient was disappointed in the increasing hgba1c and attributes it to steriod use in Aug/Sept and not being as strict with diet and exercise. However he is still participating in digital medicine and his 7 day avg blood sugar in 136. He has restarted 5x week exercise and will go back to his previous diet.     Labs and healthcare gaps were discussed as well. Mr. Carroll would like a Flu shot, colonoscopy, foot/ eye exam, and has already taken two shingles shots.      PAST MEDICAL HISTORY:  Past Medical History:   Diagnosis Date    Diabetes mellitus type II     Hypercholesteremia        PAST SURGICAL HISTORY:  Past Surgical History:   Procedure Laterality Date    removal of epididymal cyst         SOCIAL HISTORY:  Social History     Socioeconomic History    Marital status:    Occupational History     Employer: [employer]   Tobacco Use    Smoking status: Never    Smokeless tobacco: Never   Substance and Sexual Activity    Alcohol use: Yes     Comment: On occasion...not even monthly. Mostly social gatherings.    Drug use: No    Sexual activity: Yes     Partners: Female     Comment:        FAMILY HISTORY:  Family History   Problem Relation Age of Onset    Diabetes Mother     Cancer Mother         lung with mets to brain    Diabetes Maternal Aunt     Kidney disease Maternal Aunt     Diabetes Maternal Grandmother        ALLERGIES AND MEDICATIONS: updated and reviewed.  Review of patient's allergies indicates:   Allergen Reactions    Lisinopril Rash    Penicillins Swelling     Current Outpatient Medications   Medication Sig Dispense Refill    atorvastatin (LIPITOR) 10 MG tablet TAKE 1 TABLET(10 MG) BY MOUTH EVERY DAY 90 tablet 0    dulaglutide (TRULICITY) 3 mg/0.5 mL pen injector Inject 3 mg into the skin every 7 days. 4 pen 1    latanoprost 0.005 % ophthalmic solution       metFORMIN (GLUCOPHAGE-XR) 500 MG ER 24hr tablet TAKE 2 TABLETS(1000 MG) BY MOUTH TWICE DAILY WITH MEALS 360 tablet 0    sildenafil citrate (VIAGRA ORAL) CHEW 1 TO 2 TABLETS BY MOUTH 30 TO 45 MINUTES BEFORE SEXUAL INTERCOURSE      TRAVATAN Z 0.004 % Drop   6    aspirin 81 MG Chew Take 1 tablet (81 mg total) by mouth once daily. 30 tablet 0    diclofenac sodium (VOLTAREN) 1 % Gel Apply 2 g topically 4 (four) times daily. Apply to painful area for 10 days (Patient not taking: Reported on 5/2/2022) 1 Tube 0    FLUZONE QUAD 9273-6067, PF, 60 mcg (15 mcg x 4)/0.5 mL Syrg       gabapentin (NEURONTIN) 100 MG capsule Take 1 capsule (100 mg total) by mouth 2 (two) times daily. (Patient not taking: Reported on 11/25/2022) 60 capsule 11    ID NOW COVID-19 TEST KIT Kit TEST AS DIRECTED TODAY      methocarbamoL (ROBAXIN) 500 MG Tab Take 1 tablet (500 mg total) by mouth 4 (four) times daily. (Patient not taking: Reported on 6/23/2022) 60 tablet 0    sars-cov-2,  "covid-19, (MODERNA COVID-19) 100 mcg/0.5 ml injection       SHINGRIX, PF, 50 mcg/0.5 mL injection       triamcinolone acetonide 0.5% (KENALOG) 0.5 % Crea Apply topically 2 (two) times daily. (Patient not taking: Reported on 11/25/2022) 15 g 1     Current Facility-Administered Medications   Medication Dose Route Frequency Provider Last Rate Last Admin    acetaminophen tablet 650 mg  650 mg Oral Once PRN Vargas Berger MD        albuterol inhaler 2 puff  2 puff Inhalation Q20 Min PRN Vargas Berger MD        diphenhydrAMINE injection 25 mg  25 mg Intravenous Once PRN Vargas Berger MD        EPINEPHrine (EPIPEN) 0.3 mg/0.3 mL pen injection 0.3 mg  0.3 mg Intramuscular PRN Vargas Berger MD        methylPREDNISolone sodium succinate injection 40 mg  40 mg Intravenous Once PRN Vargas Berger MD        ondansetron disintegrating tablet 4 mg  4 mg Oral Once PRN Vargas Berger MD        sodium chloride 0.9% 500 mL flush bag   Intravenous PRN Vargas Berger MD        sodium chloride 0.9% flush 10 mL  10 mL Intravenous PRN Vargas Berger MD             ROS  Review of Systems   Constitutional:  Negative for chills, fever and unexpected weight change.   Respiratory: Negative.     Musculoskeletal:  Positive for back pain. Negative for gait problem.        R sided lower back pain. Lateral to L2   Neurological:  Negative for weakness.         Physical Exam  Vitals:    11/25/22 0758   BP: 92/68   BP Location: Left arm   Patient Position: Sitting   BP Method: Large (Automatic)   Pulse: 87   Temp: 97.9 °F (36.6 °C)   TempSrc: Oral   SpO2: 97%   Weight: 99.3 kg (219 lb 0.4 oz)   Height: 5' 11" (1.803 m)    Body mass index is 30.55 kg/m².  Weight: 99.3 kg (219 lb 0.4 oz)   Height: 5' 11" (180.3 cm)   Physical Exam  Cardiovascular:      Rate and Rhythm: Normal rate.   Pulmonary:      Effort: Pulmonary effort is normal.   Musculoskeletal:         General: No deformity. Normal range of motion.      " Comments: No tenderness with palpation on R lower back. Back is symmetrical with no deformity.   Neurological:      Mental Status: He is alert.      Motor: No weakness.      Gait: Gait normal.       Health Maintenance         Date Due Completion Date    Colorectal Cancer Screening Never done ---    Foot Exam 08/13/2020 8/13/2019    Eye Exam 04/21/2022 4/21/2021    Shingles Vaccine (2 of 2) 06/26/2022 5/1/2022    COVID-19 Vaccine (5 - Booster for Moderna series) 06/26/2022 5/1/2022    Lipid Panel 07/01/2022 7/1/2021    Influenza Vaccine (1) 09/01/2022 11/14/2021    Hemoglobin A1c 02/02/2023 11/2/2022    Diabetes Urine Screening 11/02/2023 11/2/2022    Low Dose Statin 11/25/2023 11/25/2022    TETANUS VACCINE 08/13/2029 8/13/2019              Assessment and Plan:    Christophe Carroll is a 52 y.o. male with multiple medical diagnoses as listed in the medical history and problem list that presents for R side lower back pain.    R Lower Back Pain  - Pain in most likely musculoskeletal in nature.  - Recommended duncan stretches that were placed into Pt's MyChart  - Take ibuprofen as needed. Try heat therapy.  - Exercise again. Pt has not been exercising as much as he used to.  - Improve workplace ergonomics. Get up often and raise height of computer.  - Place pillow between knees when sleeping on side and if possible get a new mattress  - If symptoms do not improve after stretching everyday advised physical therapy.    Type 2 diabetes: uncontrolled 8.4 from 7.7  - Elevated level Hemoglobin A1c most likely due to corticosteroid use.  - Continue with medications and healthy lifestyle choices.  - Retake hemoglobin A1c in 3 months  -continue digital medicine     Health Maintenance  - Pt received influenza vaccine today  - Colonoscopy order and foot/ eye exam scheduled    Anaid Leach  MS4, [employer] [facility]    I hereby acknowledge that I am relying upon documentation authored by a medical student working under my supervision and  further I hereby attest that I have verified the student documentation or findings by personally performing the physical exam and medical decision making activities of the Evaluation and Management service to be billed.    Yasmine Waters MD

## 2022-11-25 NOTE — PROGRESS NOTES
Patient tolerated Influenza injection well. VIS given. Instructed patient to wait in lobby for 15 minutes.

## 2023-02-02 ENCOUNTER — OFFICE VISIT (OUTPATIENT)
Dept: UROLOGY | Facility: CLINIC | Age: 53
End: 2023-02-02
Payer: COMMERCIAL

## 2023-02-02 VITALS
HEART RATE: 98 BPM | BODY MASS INDEX: 31.25 KG/M2 | HEIGHT: 71 IN | WEIGHT: 223.19 LBS | DIASTOLIC BLOOD PRESSURE: 89 MMHG | SYSTOLIC BLOOD PRESSURE: 132 MMHG

## 2023-02-02 DIAGNOSIS — N48.89 PENILE PAIN: Primary | ICD-10-CM

## 2023-02-02 DIAGNOSIS — N52.8 OTHER MALE ERECTILE DYSFUNCTION: ICD-10-CM

## 2023-02-02 DIAGNOSIS — N50.812 TESTICULAR PAIN, LEFT: ICD-10-CM

## 2023-02-02 PROCEDURE — 3008F BODY MASS INDEX DOCD: CPT | Mod: CPTII,S$GLB,, | Performed by: UROLOGY

## 2023-02-02 PROCEDURE — 99999 PR PBB SHADOW E&M-EST. PATIENT-LVL IV: CPT | Mod: PBBFAC,,, | Performed by: UROLOGY

## 2023-02-02 PROCEDURE — 99204 OFFICE O/P NEW MOD 45 MIN: CPT | Mod: S$GLB,,, | Performed by: UROLOGY

## 2023-02-02 PROCEDURE — 3075F PR MOST RECENT SYSTOLIC BLOOD PRESS GE 130-139MM HG: ICD-10-PCS | Mod: CPTII,S$GLB,, | Performed by: UROLOGY

## 2023-02-02 PROCEDURE — 3079F PR MOST RECENT DIASTOLIC BLOOD PRESSURE 80-89 MM HG: ICD-10-PCS | Mod: CPTII,S$GLB,, | Performed by: UROLOGY

## 2023-02-02 PROCEDURE — 3008F PR BODY MASS INDEX (BMI) DOCUMENTED: ICD-10-PCS | Mod: CPTII,S$GLB,, | Performed by: UROLOGY

## 2023-02-02 PROCEDURE — 99204 PR OFFICE/OUTPT VISIT, NEW, LEVL IV, 45-59 MIN: ICD-10-PCS | Mod: S$GLB,,, | Performed by: UROLOGY

## 2023-02-02 PROCEDURE — 3079F DIAST BP 80-89 MM HG: CPT | Mod: CPTII,S$GLB,, | Performed by: UROLOGY

## 2023-02-02 PROCEDURE — 3075F SYST BP GE 130 - 139MM HG: CPT | Mod: CPTII,S$GLB,, | Performed by: UROLOGY

## 2023-02-02 PROCEDURE — 99999 PR PBB SHADOW E&M-EST. PATIENT-LVL IV: ICD-10-PCS | Mod: PBBFAC,,, | Performed by: UROLOGY

## 2023-02-02 PROCEDURE — 1159F PR MEDICATION LIST DOCUMENTED IN MEDICAL RECORD: ICD-10-PCS | Mod: CPTII,S$GLB,, | Performed by: UROLOGY

## 2023-02-02 PROCEDURE — 1159F MED LIST DOCD IN RCRD: CPT | Mod: CPTII,S$GLB,, | Performed by: UROLOGY

## 2023-02-02 RX ORDER — IBUPROFEN 800 MG/1
800 TABLET ORAL 3 TIMES DAILY PRN
Qty: 90 TABLET | Refills: 0 | Status: SHIPPED | OUTPATIENT
Start: 2023-02-02 | End: 2023-03-04

## 2023-02-02 RX ORDER — TADALAFIL 20 MG/1
20 TABLET ORAL DAILY PRN
Qty: 30 TABLET | Refills: 11 | Status: SHIPPED | OUTPATIENT
Start: 2023-02-02 | End: 2023-02-10 | Stop reason: SDUPTHER

## 2023-02-02 NOTE — PROGRESS NOTES
[facility]rology   Clinic Note    SUBJECTIVE:     Chief Complaint   Patient presents with    Testicle Pain       Referral from: Self, Aaareferral.    History of Present Illness:  Christophe Carroll is a 52 y.o. male who presents to clinic for left orchalgia.    Patient presents with complaints of left testicular pain for several weeks.  Describes pain as dull and achy but occasionally sharp.  The pain waxes and wanes in intensity and is intermittent.  He does report that he scrotal surgery 24 years ago having a cyst on his testicle removed.  Denies any high-risk sexual activity.  Denies any dysuria or significantly bothersome urinary complaints.  He also reports difficulty with erections.  He is on reliably able to get an erection and when he does get when he is under reliably able to maintain it.  He is interested in medication for this.  Also reports that sometimes the pain in the testicle radiates to the penis.    Patient endorses no additional complaints at this time.    Past Medical History:   Diagnosis Date    Diabetes mellitus type II     Hypercholesteremia        Past Surgical History:   Procedure Laterality Date    removal of epididymal cyst         Family History   Problem Relation Age of Onset    Diabetes Mother     Cancer Mother         lung with mets to brain    Diabetes Maternal Aunt     Kidney disease Maternal Aunt     Diabetes Maternal Grandmother        Social History     Tobacco Use    Smoking status: Never    Smokeless tobacco: Never   Substance Use Topics    Alcohol use: Yes     Comment: On occasion...not even monthly. Mostly social gatherings.    Drug use: No       Current Outpatient Medications on File Prior to Visit   Medication Sig Dispense Refill    atorvastatin (LIPITOR) 10 MG tablet TAKE 1 TABLET(10 MG) BY MOUTH EVERY DAY 90 tablet 0    latanoprost 0.005 % ophthalmic solution       metFORMIN (GLUCOPHAGE-XR) 500 MG ER 24hr tablet TAKE 2 TABLETS(1000 MG) BY MOUTH TWICE DAILY WITH MEALS  360 tablet 0    TRAVATAN Z 0.004 % Drop   6    aspirin 81 MG Chew Take 1 tablet (81 mg total) by mouth once daily. 30 tablet 0    diclofenac sodium (VOLTAREN) 1 % Gel Apply 2 g topically 4 (four) times daily. Apply to painful area for 10 days (Patient not taking: Reported on 5/2/2022) 1 Tube 0    dulaglutide (TRULICITY) 3 mg/0.5 mL pen injector ADMINISTER 3MG UNDER THE SKIN EVERY 7 DAYS (Patient not taking: Reported on 2/2/2023) 12 pen 0    FLUZONE QUAD 3628-5805, PF, 60 mcg (15 mcg x 4)/0.5 mL Syrg       gabapentin (NEURONTIN) 100 MG capsule Take 1 capsule (100 mg total) by mouth 2 (two) times daily. (Patient not taking: Reported on 11/25/2022) 60 capsule 11    ID NOW COVID-19 TEST KIT Kit TEST AS DIRECTED TODAY      methocarbamoL (ROBAXIN) 500 MG Tab Take 1 tablet (500 mg total) by mouth 4 (four) times daily. (Patient not taking: Reported on 6/23/2022) 60 tablet 0    sars-cov-2, covid-19, (MODERNA COVID-19) 100 mcg/0.5 ml injection       SHINGRIX, PF, 50 mcg/0.5 mL injection       sildenafil citrate (VIAGRA ORAL) CHEW 1 TO 2 TABLETS BY MOUTH 30 TO 45 MINUTES BEFORE SEXUAL INTERCOURSE      triamcinolone acetonide 0.5% (KENALOG) 0.5 % Crea Apply topically 2 (two) times daily. (Patient not taking: Reported on 11/25/2022) 15 g 1     Current Facility-Administered Medications on File Prior to Visit   Medication Dose Route Frequency Provider Last Rate Last Admin    acetaminophen tablet 650 mg  650 mg Oral Once PRN Vargas Berger MD        albuterol inhaler 2 puff  2 puff Inhalation Q20 Min PRN Vargas Berger MD        diphenhydrAMINE injection 25 mg  25 mg Intravenous Once PRN Vargas Berger MD        EPINEPHrine (EPIPEN) 0.3 mg/0.3 mL pen injection 0.3 mg  0.3 mg Intramuscular PRN Vargas Berger MD        methylPREDNISolone sodium succinate injection 40 mg  40 mg Intravenous Once PRN Vargas Berger MD        ondansetron disintegrating tablet 4 mg  4 mg Oral Once PRN Vargas Berger MD         "sodium chloride 0.9% 500 mL flush bag   Intravenous PRN Vargas Berger MD        sodium chloride 0.9% flush 10 mL  10 mL Intravenous PRN Vargas Berger MD           Review of patient's allergies indicates:   Allergen Reactions    Lisinopril Rash    Penicillins Swelling       Review of Systems:  A review of 10+ systems was conducted with pertinent positive and negative findings documented in HPI with all other systems reviewed and negative.    OBJECTIVE:     Estimated body mass index is 31.13 kg/m² as calculated from the following:    Height as of this encounter: 5' 11" (1.803 m).    Weight as of this encounter: 101.2 kg (223 lb 3.5 oz).    Vital Signs (Most Recent)  Vitals:    02/02/23 1305   BP: 132/89   Pulse: 98       Physical Exam:  GENERAL: patient sitting comfortably  HEENT: normocephalic  NECK: supple, no JVD  PULM: normal chest rise, no increased WOB  HEART: non-diaphoretic  ABDO: soft, nondistended, nontender  BACK: no CVA tenderness bilaterally  SKIN: warm, dry, well perfused  EXT: no bruising or edema  NEURO: grossly normal with no focal deficits  PSYCH: appropriate mood and affect    Genitourinary Exam:  circumcised phallus with orthotopic meatus without lesions.  Bilaterally descended testes in normal position and lie without appreciable masses.    The left testicle is mildly tender to palpation.  Scrotum without erythema, lesions, or masses.  No appreciable inguinal hernias.    Lab Results   Component Value Date    BUN 9 07/01/2021    CREATININE 1.1 07/01/2021    WBC 7.27 03/29/2021    HGB 14.9 03/29/2021    HCT 47.2 03/29/2021     03/29/2021    AST 20 07/01/2021    ALT 22 07/01/2021    ALKPHOS 84 07/01/2021    ALBUMIN 4.5 07/01/2021    HGBA1C 8.4 (H) 11/02/2022    INR 1.1 10/08/2019        Imaging:  I have personally reviewed all relevant imaging studies.    No results found for this or any previous visit (from the past 2160 hour(s)).  No results found for this or any previous visit " (from the past 2160 hour(s)).  MRI Cervical Spine Without Contrast  Narrative: EXAMINATION:  MRI CERVICAL SPINE WITHOUT CONTRAST    CLINICAL HISTORY:  Spinal stenosis, cervical;radiculopathy; Spinal stenosis, cervical region    TECHNIQUE:  Multiplanar, multisequence MR images of the cervical spine were performed without the use of intravenous contrast.    COMPARISON:  Cervical spine radiograph 04/17/2022    MRI cervical spine 11/21/2014    FINDINGS:  Alignment: Normal.    Vertebrae: Normal marrow signal. No fracture.    Discs: Mild multilevel disc height loss and desiccation.    Cord: Normal.    Cerebellar tonsils are in their expected location.  Visualized brainstem is normal.    Vertebral artery flow voids are present.  Prevertebral soft tissues are normal.  Visualized parotid, submandibular, and thyroid glands unremarkable.  Multiple scattered prominent to mildly enlarged lymph nodes with the largest a submental lymph node measuring 1.1 cm in short axis (axial series 8, image 23), unchanged from MRI 11/21/2014. Paraspinal musculature demonstrates normal bulk and signal intensity.    Degenerative findings:    C2-C3: No neural foraminal narrowing or spinal canal stenosis.    C3-C4: Posterior disc osteophyte complex.  Bilateral uncovertebral arthropathy.  Moderate bilateral neural foraminal narrowing.  No spinal canal stenosis.    C4-C5: Posterior disc osteophyte complex lateralizing left.  Bilateral uncovertebral arthropathy, left greater than right.  Severe left and mild right neural foraminal narrowing.  No spinal canal stenosis.    C5-C6: Posterior disc osteophyte complex lateralizing right.  Bilateral uncovertebral arthropathy, right greater than left.  Severe right and mild left neural foraminal narrowing.  No spinal canal stenosis.    C6-C7: Posterior disc osteophyte complex.  Bilateral uncovertebral arthropathy, left greater than right.  Severe left and mild right neural foraminal narrowing.  No spinal canal  stenosis.    C7-T1: Posterior disc osteophyte complex lateralizing left.  Bilateral facet and uncovertebral arthropathy.  Moderate left and mild right neural foraminal narrowing.  No spinal canal stenosis.  Impression: Multilevel degenerative change of the cervical spine with severe neural foraminal narrowing on the left at C4-C5 and C6-C7 and on the right at C5-C6.  Findings progressed from MRI cervical spine 11/21/2014.    Electronically signed by resident: Tu Hampton  Date:    06/23/2022  Time:    10:29    Electronically signed by: Domo Montero MD  Date:    06/23/2022  Time:    11:16       PSA:  Lab Results   Component Value Date    PSA 0.54 03/29/2021    PSA 0.43 11/07/2012       Testosterone:  Lab Results   Component Value Date    TOTALTESTOST 324 07/11/2015        ASSESSMENT     1. Penile pain    2. Other male erectile dysfunction    3. Testicular pain, left        PLAN:     left orchalgia     - We had a long discussion regarding etiology including torsion, trauma, infection, hernia repair, epididymal cyst, and that most is idiopathic.     - We discussed several options conservative management with observation, scrotal support with a jock strap, decreased physical activity, and heat/cold therapy. We discussed the pain could be from either inflammatory, infectious or neuropathic pain. We would start initially with anti-inflammatories with ibuprofen 600-800 mg TID or naproxen 500 mg BID (% efficacy). Pelvic floor PT can be helpful. Spermatic cord blocks were also discussed.    - If nothing helps, final option would be to consider simple orchiectomy (~75% efficacy).    - Will order scrotal US and urine culture. Ibuprofen 800mg TID prescribed x 2 weeks.     2. Erectile Dysfunction    - We discussed the etiology and management of ED, including organic and psychogenic causes. First line therapy involves treatment with PDE-5 inhibitors. We discussed the risks and benefits of treatment with PDE5i's.  Treatment with FADIA was also briefly discussed.     - Next line therapies would include intraurethral suppository (125-1000 ug), then intracavernosal injections (1 ml trimix papaverine 30 mg, phentolamine 1 mg, prostaglandin E1 10 ug). Ultimately, a penile prosthesis would be the final option.     - We will initiate therapy with a PDE5i with Cialis 20mg PRN.      Return to clinic as needed.    Kareem Browne MD  Urology  [facility] [place] [facility] [place]    Disclaimer: This note has been generated using voice-recognition software. There may be typographical errors that have been missed during proof-reading.

## 2023-02-07 ENCOUNTER — HOSPITAL ENCOUNTER (OUTPATIENT)
Dept: RADIOLOGY | Facility: HOSPITAL | Age: 53
Discharge: HOME | End: 2023-02-07
Attending: UROLOGY
Payer: COMMERCIAL

## 2023-02-07 DIAGNOSIS — N48.89 PENILE PAIN: ICD-10-CM

## 2023-02-07 PROCEDURE — 76870 US SCROTUM AND TESTICLES: ICD-10-PCS | Mod: 26,,, | Performed by: RADIOLOGY

## 2023-02-07 PROCEDURE — 76870 US EXAM SCROTUM: CPT | Mod: TC

## 2023-02-07 PROCEDURE — 76870 US EXAM SCROTUM: CPT | Mod: 26,,, | Performed by: RADIOLOGY

## 2023-02-10 ENCOUNTER — PATIENT MESSAGE (OUTPATIENT)
Dept: UROLOGY | Facility: CLINIC | Age: 53
End: 2023-02-10
Payer: COMMERCIAL

## 2023-02-10 DIAGNOSIS — N52.8 OTHER MALE ERECTILE DYSFUNCTION: ICD-10-CM

## 2023-02-10 RX ORDER — TADALAFIL 20 MG/1
20 TABLET ORAL DAILY PRN
Qty: 30 TABLET | Refills: 11 | Status: SHIPPED | OUTPATIENT
Start: 2023-02-10 | End: 2024-02-10

## 2023-03-31 ENCOUNTER — LAB VISIT (OUTPATIENT)
Dept: LAB | Facility: HOSPITAL | Age: 53
End: 2023-03-31
Payer: COMMERCIAL

## 2023-03-31 DIAGNOSIS — E11.9 TYPE 2 DIABETES MELLITUS WITHOUT COMPLICATION, WITHOUT LONG-TERM CURRENT USE OF INSULIN: ICD-10-CM

## 2023-03-31 LAB
ESTIMATED AVG GLUCOSE: 154 MG/DL (ref 68–131)
HBA1C MFR BLD: 7 % (ref 4–5.6)

## 2023-03-31 PROCEDURE — 36415 COLL VENOUS BLD VENIPUNCTURE: CPT | Mod: PO | Performed by: INTERNAL MEDICINE

## 2023-03-31 PROCEDURE — 83036 HEMOGLOBIN GLYCOSYLATED A1C: CPT | Performed by: INTERNAL MEDICINE

## 2023-04-01 ENCOUNTER — PATIENT MESSAGE (OUTPATIENT)
Dept: FAMILY MEDICINE | Facility: CLINIC | Age: 53
End: 2023-04-01
Payer: COMMERCIAL

## 2023-04-01 DIAGNOSIS — Z12.11 SCREENING FOR COLON CANCER: Primary | ICD-10-CM

## 2023-04-24 ENCOUNTER — PATIENT MESSAGE (OUTPATIENT)
Dept: FAMILY MEDICINE | Facility: CLINIC | Age: 53
End: 2023-04-24
Payer: COMMERCIAL

## 2023-05-05 ENCOUNTER — PATIENT OUTREACH (OUTPATIENT)
Dept: ADMINISTRATIVE | Facility: HOSPITAL | Age: 53
End: 2023-05-05
Payer: COMMERCIAL

## 2023-05-05 NOTE — LETTER
AUTHORIZATION FOR RELEASE OF   CONFIDENTIAL INFORMATION    Dear Dr. Ruddy Davis,    We are seeing Christophe Carroll, date of birth 1970, in the clinic at [facility] FAMILY MED/ INTERNAL MED/ PEDS. Moris Menchaca MD is the patient's PCP. Christophe Carroll has an outstanding lab/procedure at the time we reviewed his chart. In order to help keep his health information updated, he has authorized us to request the following medical record(s):        (  )  MAMMOGRAM                                      (  )  COLONOSCOPY      (  )  PAP SMEAR                                          (  )  OUTSIDE LAB RESULTS     (  )  DEXA SCAN                                          ( x ) EYE EXAM(diabetic) [id number]            (  )  FOOT EXAM                                          (  )  ENTIRE RECORD     (  )  OUTSIDE IMMUNIZATIONS                 (  )  _______________         Please fax records to [facility], Oliver B Mollere, MD,  [phone].     If you have any questions, please contact Citlaly García LPN [facility] at   [phone].     Patient Name: Christophe Carroll  : 1970  Patient Phone #: [phone]

## 2023-05-05 NOTE — PROGRESS NOTES
[facility] 04.21.23 Colon - appointment scheduled on 08/01/2023.    Overdue Diabetic Eye Exam - Left message for patient to call our office.

## 2023-05-11 LAB
LEFT EYE DM RETINOPATHY: NEGATIVE
RIGHT EYE DM RETINOPATHY: NEGATIVE

## 2023-05-18 ENCOUNTER — PATIENT OUTREACH (OUTPATIENT)
Dept: ADMINISTRATIVE | Facility: HOSPITAL | Age: 53
End: 2023-05-18
Payer: COMMERCIAL

## 2023-05-18 DIAGNOSIS — E78.5 HYPERLIPIDEMIA ASSOCIATED WITH TYPE 2 DIABETES MELLITUS: Primary | Chronic | ICD-10-CM

## 2023-05-18 DIAGNOSIS — E11.69 HYPERLIPIDEMIA ASSOCIATED WITH TYPE 2 DIABETES MELLITUS: Primary | Chronic | ICD-10-CM

## 2023-05-18 NOTE — LETTER
AUTHORIZATION FOR RELEASE OF   CONFIDENTIAL INFORMATION    Dear Dr. Ruddy Davis,    We are seeing Christophe Carroll, date of birth 1970, in the clinic at [facility] FAMILY MED/ INTERNAL MED/ PEDS. Moris Menchaca MD is the patient's PCP. Christophe Carroll has an outstanding lab/procedure at the time we reviewed his chart. In order to help keep his health information updated, he has authorized us to request the following medical record(s):        (  )  MAMMOGRAM                                      (  )  COLONOSCOPY      (  )  PAP SMEAR                                          (  )  OUTSIDE LAB RESULTS     (  )  DEXA SCAN                                          ( x )  EYE EXAM(diabetic)             (  )  FOOT EXAM                                          (  )  ENTIRE RECORD     (  )  OUTSIDE IMMUNIZATIONS                 (  )  _______________         Please fax records to [facility], Oliver B Mollere, MD,  [phone].     If you have any questions, please contact Citlayl García LPN [facility] at   [phone].     Patient Name: Christophe Carroll  : 1970  Patient Phone #: [phone]

## 2023-05-25 ENCOUNTER — PATIENT OUTREACH (OUTPATIENT)
Dept: ADMINISTRATIVE | Facility: HOSPITAL | Age: 53
End: 2023-05-25
Payer: COMMERCIAL

## 2023-05-28 DIAGNOSIS — E11.69 HYPERLIPIDEMIA ASSOCIATED WITH TYPE 2 DIABETES MELLITUS: Chronic | ICD-10-CM

## 2023-05-28 DIAGNOSIS — E78.5 HYPERLIPIDEMIA ASSOCIATED WITH TYPE 2 DIABETES MELLITUS: Chronic | ICD-10-CM

## 2023-05-28 NOTE — TELEPHONE ENCOUNTER
Care Due:                  Date            Visit Type   Department     Provider  --------------------------------------------------------------------------------                                EP [facility] FAMILY                              PRIMARY      MED/ INTERNAL  Last Visit: 11-      CARE ([facility])   MED/ PEDS      Yasmine Waters  Next Visit: None Scheduled  None         None Found                                                            Last  Test          Frequency    Reason                     Performed    Due Date  --------------------------------------------------------------------------------    CMP.........  12 months..  atorvastatin, metFORMIN..  07- 06-    Lipid Panel.  12 months..  atorvastatin.............  07- 06-    [facility] Embedded Care Due Messages. Reference number: [id number].   5/28/2023 2:43:33 PM CDT

## 2023-05-29 DIAGNOSIS — Z00.00 VISIT FOR ANNUAL HEALTH EXAMINATION: Primary | ICD-10-CM

## 2023-05-29 DIAGNOSIS — Z12.5 PROSTATE CANCER SCREENING: ICD-10-CM

## 2023-05-29 RX ORDER — ATORVASTATIN CALCIUM 10 MG/1
TABLET, FILM COATED ORAL
Qty: 90 TABLET | Refills: 0 | Status: SHIPPED | OUTPATIENT
Start: 2023-05-29 | End: 2023-10-16

## 2023-05-29 NOTE — TELEPHONE ENCOUNTER
Refill Routing Note   Medication(s) are not appropriate for processing by [facility] for the following reason(s):      Patient not seen by PCP within 15 months  Patient seen in ED/Hospital since LOV with PCP  Required labs outdated    [facility] action(s):  Defer Labs due     Medication Therapy Plan: (Labs:CMP)      Appointments  past 12m or future 3m with PCP    Date Provider   Last Visit   4/1/2021 Moris Menchaca MD   Next Visit   Visit date not found Moris Menchaca MD   ED visits in past 90 days: 0        Note composed:6:36 PM 05/29/2023

## 2023-06-02 ENCOUNTER — LAB VISIT (OUTPATIENT)
Dept: LAB | Facility: HOSPITAL | Age: 53
End: 2023-06-02
Attending: INTERNAL MEDICINE
Payer: COMMERCIAL

## 2023-06-02 DIAGNOSIS — E11.9 TYPE 2 DIABETES MELLITUS WITHOUT COMPLICATION, WITHOUT LONG-TERM CURRENT USE OF INSULIN: ICD-10-CM

## 2023-06-02 DIAGNOSIS — Z00.00 VISIT FOR ANNUAL HEALTH EXAMINATION: ICD-10-CM

## 2023-06-02 DIAGNOSIS — E11.69 HYPERLIPIDEMIA ASSOCIATED WITH TYPE 2 DIABETES MELLITUS: Chronic | ICD-10-CM

## 2023-06-02 DIAGNOSIS — Z12.5 PROSTATE CANCER SCREENING: ICD-10-CM

## 2023-06-02 DIAGNOSIS — E78.5 HYPERLIPIDEMIA ASSOCIATED WITH TYPE 2 DIABETES MELLITUS: Chronic | ICD-10-CM

## 2023-06-02 LAB
ALBUMIN SERPL BCP-MCNC: 4.4 G/DL (ref 3.5–5.2)
ALP SERPL-CCNC: 88 U/L (ref 55–135)
ALT SERPL W/O P-5'-P-CCNC: 21 U/L (ref 10–44)
ANION GAP SERPL CALC-SCNC: 10 MMOL/L (ref 8–16)
AST SERPL-CCNC: 21 U/L (ref 10–40)
BASOPHILS # BLD AUTO: 0.03 K/UL (ref 0–0.2)
BASOPHILS NFR BLD: 0.4 % (ref 0–1.9)
BILIRUB SERPL-MCNC: 0.5 MG/DL (ref 0.1–1)
BUN SERPL-MCNC: 9 MG/DL (ref 6–20)
CALCIUM SERPL-MCNC: 10.3 MG/DL (ref 8.7–10.5)
CHLORIDE SERPL-SCNC: 102 MMOL/L (ref 95–110)
CHOLEST SERPL-MCNC: 107 MG/DL (ref 120–199)
CHOLEST/HDLC SERPL: 3 {RATIO} (ref 2–5)
CO2 SERPL-SCNC: 26 MMOL/L (ref 23–29)
COMPLEXED PSA SERPL-MCNC: 0.62 NG/ML (ref 0–4)
CREAT SERPL-MCNC: 1 MG/DL (ref 0.5–1.4)
DIFFERENTIAL METHOD: ABNORMAL
EOSINOPHIL # BLD AUTO: 0.2 K/UL (ref 0–0.5)
EOSINOPHIL NFR BLD: 2.2 % (ref 0–8)
ERYTHROCYTE [DISTWIDTH] IN BLOOD BY AUTOMATED COUNT: 13.2 % (ref 11.5–14.5)
EST. GFR  (NO RACE VARIABLE): >60 ML/MIN/1.73 M^2
ESTIMATED AVG GLUCOSE: 148 MG/DL (ref 68–131)
GLUCOSE SERPL-MCNC: 148 MG/DL (ref 70–110)
HBA1C MFR BLD: 6.8 % (ref 4–5.6)
HCT VFR BLD AUTO: 51.3 % (ref 40–54)
HDLC SERPL-MCNC: 36 MG/DL (ref 40–75)
HDLC SERPL: 33.6 % (ref 20–50)
HGB BLD-MCNC: 16.1 G/DL (ref 14–18)
IMM GRANULOCYTES # BLD AUTO: 0.01 K/UL (ref 0–0.04)
IMM GRANULOCYTES NFR BLD AUTO: 0.1 % (ref 0–0.5)
LDLC SERPL CALC-MCNC: 44.8 MG/DL (ref 63–159)
LYMPHOCYTES # BLD AUTO: 2.4 K/UL (ref 1–4.8)
LYMPHOCYTES NFR BLD: 35.3 % (ref 18–48)
MCH RBC QN AUTO: 27.6 PG (ref 27–31)
MCHC RBC AUTO-ENTMCNC: 31.4 G/DL (ref 32–36)
MCV RBC AUTO: 88 FL (ref 82–98)
MONOCYTES # BLD AUTO: 0.6 K/UL (ref 0.3–1)
MONOCYTES NFR BLD: 8.9 % (ref 4–15)
NEUTROPHILS # BLD AUTO: 3.6 K/UL (ref 1.8–7.7)
NEUTROPHILS NFR BLD: 53.1 % (ref 38–73)
NONHDLC SERPL-MCNC: 71 MG/DL
NRBC BLD-RTO: 0 /100 WBC
PLATELET # BLD AUTO: 220 K/UL (ref 150–450)
PMV BLD AUTO: 10.8 FL (ref 9.2–12.9)
POTASSIUM SERPL-SCNC: 4.8 MMOL/L (ref 3.5–5.1)
PROT SERPL-MCNC: 7.7 G/DL (ref 6–8.4)
RBC # BLD AUTO: 5.83 M/UL (ref 4.6–6.2)
SODIUM SERPL-SCNC: 138 MMOL/L (ref 136–145)
TRIGL SERPL-MCNC: 131 MG/DL (ref 30–150)
WBC # BLD AUTO: 6.85 K/UL (ref 3.9–12.7)

## 2023-06-02 PROCEDURE — 83036 HEMOGLOBIN GLYCOSYLATED A1C: CPT | Performed by: INTERNAL MEDICINE

## 2023-06-02 PROCEDURE — 80053 COMPREHEN METABOLIC PANEL: CPT

## 2023-06-02 PROCEDURE — 85025 COMPLETE CBC W/AUTO DIFF WBC: CPT

## 2023-06-02 PROCEDURE — 36415 COLL VENOUS BLD VENIPUNCTURE: CPT | Mod: PO

## 2023-06-02 PROCEDURE — 84153 ASSAY OF PSA TOTAL: CPT

## 2023-06-02 PROCEDURE — 80061 LIPID PANEL: CPT | Performed by: INTERNAL MEDICINE

## 2023-06-05 ENCOUNTER — OFFICE VISIT (OUTPATIENT)
Dept: FAMILY MEDICINE | Facility: CLINIC | Age: 53
End: 2023-06-05
Payer: COMMERCIAL

## 2023-06-05 VITALS
HEIGHT: 71 IN | DIASTOLIC BLOOD PRESSURE: 75 MMHG | OXYGEN SATURATION: 97 % | SYSTOLIC BLOOD PRESSURE: 135 MMHG | WEIGHT: 214.75 LBS | TEMPERATURE: 98 F | HEART RATE: 89 BPM | BODY MASS INDEX: 30.06 KG/M2

## 2023-06-05 DIAGNOSIS — E11.9 TYPE 2 DIABETES MELLITUS WITHOUT COMPLICATION, WITHOUT LONG-TERM CURRENT USE OF INSULIN: Chronic | ICD-10-CM

## 2023-06-05 DIAGNOSIS — E11.69 HYPERLIPIDEMIA ASSOCIATED WITH TYPE 2 DIABETES MELLITUS: Chronic | ICD-10-CM

## 2023-06-05 DIAGNOSIS — E11.65 TYPE 2 DIABETES MELLITUS WITH HYPERGLYCEMIA, WITHOUT LONG-TERM CURRENT USE OF INSULIN: ICD-10-CM

## 2023-06-05 DIAGNOSIS — E78.5 HYPERLIPIDEMIA ASSOCIATED WITH TYPE 2 DIABETES MELLITUS: Chronic | ICD-10-CM

## 2023-06-05 DIAGNOSIS — Z00.00 VISIT FOR ANNUAL HEALTH EXAMINATION: Primary | ICD-10-CM

## 2023-06-05 PROCEDURE — 3044F PR MOST RECENT HEMOGLOBIN A1C LEVEL <7.0%: ICD-10-PCS | Mod: CPTII,S$GLB,,

## 2023-06-05 PROCEDURE — 1160F RVW MEDS BY RX/DR IN RCRD: CPT | Mod: CPTII,S$GLB,,

## 2023-06-05 PROCEDURE — 99999 PR PBB SHADOW E&M-EST. PATIENT-LVL V: CPT | Mod: PBBFAC,,,

## 2023-06-05 PROCEDURE — 1159F PR MEDICATION LIST DOCUMENTED IN MEDICAL RECORD: ICD-10-PCS | Mod: CPTII,S$GLB,,

## 2023-06-05 PROCEDURE — 99999 PR PBB SHADOW E&M-EST. PATIENT-LVL V: ICD-10-PCS | Mod: PBBFAC,,,

## 2023-06-05 PROCEDURE — 3066F NEPHROPATHY DOC TX: CPT | Mod: CPTII,S$GLB,,

## 2023-06-05 PROCEDURE — 99396 PREV VISIT EST AGE 40-64: CPT | Mod: S$GLB,,,

## 2023-06-05 PROCEDURE — 1160F PR REVIEW ALL MEDS BY PRESCRIBER/CLIN PHARMACIST DOCUMENTED: ICD-10-PCS | Mod: CPTII,S$GLB,,

## 2023-06-05 PROCEDURE — 1159F MED LIST DOCD IN RCRD: CPT | Mod: CPTII,S$GLB,,

## 2023-06-05 PROCEDURE — 3078F PR MOST RECENT DIASTOLIC BLOOD PRESSURE < 80 MM HG: ICD-10-PCS | Mod: CPTII,S$GLB,,

## 2023-06-05 PROCEDURE — 3008F PR BODY MASS INDEX (BMI) DOCUMENTED: ICD-10-PCS | Mod: CPTII,S$GLB,,

## 2023-06-05 PROCEDURE — 99396 PR PREVENTIVE VISIT,EST,40-64: ICD-10-PCS | Mod: S$GLB,,,

## 2023-06-05 PROCEDURE — 3044F HG A1C LEVEL LT 7.0%: CPT | Mod: CPTII,S$GLB,,

## 2023-06-05 PROCEDURE — 3008F BODY MASS INDEX DOCD: CPT | Mod: CPTII,S$GLB,,

## 2023-06-05 PROCEDURE — 3078F DIAST BP <80 MM HG: CPT | Mod: CPTII,S$GLB,,

## 2023-06-05 PROCEDURE — 3075F SYST BP GE 130 - 139MM HG: CPT | Mod: CPTII,S$GLB,,

## 2023-06-05 PROCEDURE — 3075F PR MOST RECENT SYSTOLIC BLOOD PRESS GE 130-139MM HG: ICD-10-PCS | Mod: CPTII,S$GLB,,

## 2023-06-05 PROCEDURE — 3066F PR DOCUMENTATION OF TREATMENT FOR NEPHROPATHY: ICD-10-PCS | Mod: CPTII,S$GLB,,

## 2023-06-05 PROCEDURE — 3061F NEG MICROALBUMINURIA REV: CPT | Mod: CPTII,S$GLB,,

## 2023-06-05 PROCEDURE — 3061F PR NEG MICROALBUMINURIA RESULT DOCUMENTED/REVIEW: ICD-10-PCS | Mod: CPTII,S$GLB,,

## 2023-06-05 NOTE — ASSESSMENT & PLAN NOTE
The current medical regimen is effective;  continue present plan and medications. Heart healthy diet. Continue walking.

## 2023-06-05 NOTE — ASSESSMENT & PLAN NOTE
The current medical regimen is effective;  continue present plan and medications. Metformin 500mg BID and 3m trulicity. A1c at goal     -discussed with patient about routine diabetic care that includes but are not limited to regular eye exams, skin care, daily foot exam, proper nutrition, regular BG monitoring at home (fasting and mealtime) and medication compliance in a diabetic.  Target morning BS  and meal-time BS <180

## 2023-06-05 NOTE — PROGRESS NOTES
"  HPI     Chief Complaint:  Chief Complaint   Patient presents with    Annual Exam       Christophe Carroll is a 52 y.o. male with multiple medical diagnoses as listed in the medical history and problem list that presents for annual.  Pt is new to me but seen in clinic with last appointment with PCP 4/1/2021 with Dr. Menchaca      HPI    Doing well, no concerns or complaints. Sleeping well, sometimes has stress at home. When he exercises routinely gets better rest. Avoids coffee/caffeine.     Social Factors  Tobacco use: No  Ready to Quit:   Alcohol: Yes socially, 3-4 drinks but will go months without  Intimate partner violence screening  "Do you feel safe in your current relationship?" Yes with wife for 32 years, lives with wife and 3 sons at home.   "Have you ever been in a relationship in which your partner frightened you or hurt you?" No  Living Will/POA: No  Regular Exercise: Yes, walking/jogging, exercise    Depression  Over the past two weeks, have you felt down, depressed, or hopeless? No  Over the past two weeks, have you felt little interest or pleasure in doing things? No    Reproductive Health  STD screening in last year: n/a  HIV screening: reviewed      Screen for Chronic Disease  CHD Risk Factors: dyslipidemia and male gender  Estimated body mass index is 29.95 kg/m² as calculated from the following:    Height as of this encounter: 5' 11" (1.803 m).    Weight as of this encounter: 97.4 kg (214 lb 11.7 oz).  Dyslipidemia screening needed: reviewed lipid panel 6/2023  T2DM screening needed: reviewed a1c 6.8 6/2023  Colonoscopy needed: scheduled for 8/1/2023  PSA needed: reviewed WNL from 2023  AAA screening needed: n/a given age  Screen men 35 years and older, and men 20 to 34 years of age who have cardiovascular risk factors for dyslipidemia  Begin screening colonoscopies at 50 years of age in men of average risk, and continue until 75 years of age; offer fecal occult blood testing every year, " flexible sigmoidoscopy every five years combined with fecal occult blood testing every three years, or colonoscopy every 10 years   The American Urological Association recommends offering PSA testing and digital rectal examination to well-informed men beginning at 40 years of age and continuing until life expectancy is less than 10 years  Screen once with ultrasonography in men 65 to 75 years of age if they have a family history or have smoked at [id number] cigarettes in their lifetime  Screen men with a sustained blood pressure greater than 135/80 mm Hg for T2DM        Assessment & Plan       Problem List Items Addressed This Visit          Cardiac/Vascular    Hyperlipidemia associated with type 2 diabetes mellitus (Chronic)    Current Assessment & Plan     The current medical regimen is effective;  continue present plan and medications. Heart healthy diet. Continue walking.             Endocrine    Type 2 diabetes mellitus without complication, without long-term current use of insulin (Chronic)    Current Assessment & Plan     The current medical regimen is effective;  continue present plan and medications. Metformin 500mg BID and 3m trulicity. A1c at goal     -discussed with patient about routine diabetic care that includes but are not limited to regular eye exams, skin care, daily foot exam, proper nutrition, regular BG monitoring at home (fasting and mealtime) and medication compliance in a diabetic.  Target morning BS  and meal-time BS <180           BMI 29.0-29.9,adult    Current Assessment & Plan     Walking/jogging 3-5 x per week plus working out 4-6 times per week. Continue diet/exercise.           Other Visit Diagnoses       Visit for annual health examination    -  Primary  Anticipatory guidance provided with age appropriate preventative services discussed, healthy diet and regular physical exercise also discussed with patient      Type 2 diabetes mellitus with hyperglycemia, without long-term  current use of insulin        Relevant Orders    Ambulatory referral/consult to Podiatry            --------------------------------------------      Health Maintenance:  Health Maintenance         Date Due Completion Date    Colorectal Cancer Screening Never done ---    Foot Exam 08/13/2020 8/13/2019    Shingles Vaccine (2 of 2) 06/26/2022 5/1/2022    COVID-19 Vaccine (5 - Moderna series) 06/26/2022 5/1/2022    Hemoglobin A1c 12/02/2023 6/2/2023    Eye Exam 05/11/2024 5/11/2023    Low Dose Statin 05/29/2024 5/29/2023    Diabetes Urine Screening 06/02/2024 6/2/2023    Lipid Panel 06/02/2024 6/2/2023    TETANUS VACCINE 08/13/2029 8/13/2019            Health maintenance reviewed  Foot exam: Defers. Will place podiatry referral for foot exam  Shingles, sent to pharmacy  Dr. Davis (2023 eye exam UTD).     Follow Up:  Follow up in about 6 months (around 12/5/2023) for appt with PCP.    Discussed DDx, condition, and treatment.   Education sent to patient portal/included in after visit summary.  ED precautions given.   Notify provider if symptoms do not resolve or increase in severity.   Patient verbalizes understanding and agrees with plan of care.      Exam     Review of Systems:  (as noted above)  Review of Systems   Constitutional:  Negative for activity change, appetite change, diaphoresis, fatigue and fever.   Eyes:  Negative for visual disturbance.   Respiratory:  Negative for cough, chest tightness, shortness of breath, wheezing and stridor.    Cardiovascular:  Negative for chest pain, palpitations and leg swelling.   Gastrointestinal:  Negative for blood in stool, constipation and diarrhea.   Endocrine: Negative for polydipsia, polyphagia and polyuria.   Genitourinary:  Negative for flank pain, frequency, hematuria, testicular pain and urgency.   Musculoskeletal:  Negative for arthralgias, back pain, joint swelling and myalgias.   Skin:  Negative for color change, pallor, rash and wound.   Neurological:  Negative  "for dizziness, speech difficulty, weakness, light-headedness, numbness and headaches.   Hematological:  Negative for adenopathy. Does not bruise/bleed easily.   Psychiatric/Behavioral:  Negative for dysphoric mood, hallucinations, self-injury and sleep disturbance.      Physical Exam:   Physical Exam  Constitutional:       General: He is not in acute distress.     Appearance: Normal appearance. He is not toxic-appearing.   HENT:      Head: Normocephalic.   Cardiovascular:      Rate and Rhythm: Normal rate and regular rhythm.      Pulses: Normal pulses.      Heart sounds: Normal heart sounds. No murmur heard.  Pulmonary:      Effort: Pulmonary effort is normal. No respiratory distress.      Breath sounds: Normal breath sounds. No wheezing.   Musculoskeletal:         General: Normal range of motion.      Cervical back: Normal range of motion and neck supple. No rigidity.   Skin:     General: Skin is warm and dry.      Capillary Refill: Capillary refill takes less than 2 seconds.   Neurological:      Mental Status: He is alert.   Psychiatric:         Mood and Affect: Mood normal.     Vitals:    06/05/23 1414   BP: 135/75   Pulse: 89   Temp: 97.9 °F (36.6 °C)   TempSrc: Oral   SpO2: 97%   Weight: 97.4 kg (214 lb 11.7 oz)   Height: 5' 11" (1.803 m)      Body mass index is 29.95 kg/m².        History     Past Medical History:  Past Medical History:   Diagnosis Date    Diabetes mellitus type II     Hypercholesteremia        Past Surgical History:  Past Surgical History:   Procedure Laterality Date    removal of epididymal cyst         Social History:  Social History     Socioeconomic History    Marital status:    Occupational History     Employer: [employer]   Tobacco Use    Smoking status: Never    Smokeless tobacco: Never   Substance and Sexual Activity    Alcohol use: Yes     Comment: On occasion...not even monthly. Mostly social gatherings.    Drug use: No    Sexual activity: Yes     Partners: Female     " Comment:        Family History:  Family History   Problem Relation Age of Onset    Diabetes Mother     Cancer Mother         lung with mets to brain    Diabetes Maternal Aunt     Kidney disease Maternal Aunt     Diabetes Maternal Grandmother        Allergies and Medications: (updated and reviewed)  Review of patient's allergies indicates:   Allergen Reactions    Lisinopril Rash    Penicillins Swelling     Current Outpatient Medications   Medication Sig Dispense Refill    atorvastatin (LIPITOR) 10 MG tablet TAKE 1 TABLET(10 MG) BY MOUTH EVERY DAY 90 tablet 0    dulaglutide (TRULICITY) 3 mg/0.5 mL pen injector INJECT 3 MG UNDER THE SKIN EVERY 7 DAYS 12 pen 0    latanoprost 0.005 % ophthalmic solution       metFORMIN (GLUCOPHAGE-XR) 500 MG ER 24hr tablet TAKE 2 TABLETS(1000 MG) BY MOUTH TWICE DAILY WITH MEALS 360 tablet 0    tadalafiL (CIALIS) 20 MG Tab Take 1 tablet (20 mg total) by mouth daily as needed (Erectile Dysfunction). 30 tablet 11    TRAVATAN Z 0.004 % Drop   6    aspirin 81 MG Chew Take 1 tablet (81 mg total) by mouth once daily. 30 tablet 0    FLUZONE QUAD 8699-4351, PF, 60 mcg (15 mcg x 4)/0.5 mL Syrg       ID NOW COVID-19 TEST KIT Kit TEST AS DIRECTED TODAY      sars-cov-2, covid-19, (MODERNA COVID-19) 100 mcg/0.5 ml injection       SHINGRIX, PF, 50 mcg/0.5 mL injection        Current Facility-Administered Medications   Medication Dose Route Frequency Provider Last Rate Last Admin    EPINEPHrine (EPIPEN) 0.3 mg/0.3 mL pen injection 0.3 mg  0.3 mg Intramuscular PRN Vargas Berger MD           Patient Care Team:  Moris Menchaca MD as PCP - General (Internal Medicine)  Moris Menchaca MD as Diabetes Digital Medicine Responsible Provider (Internal Medicine)  Angelique Shah as Digital Medicine Health   Citlaly García LPN as Care Coordinator  Ruddy Davis MD as Consulting Physician (Ophthalmology)  Cristin Srinivasan, PharmD as Diabetes Digital Medicine Clinician (Pharmacist)         -  The patient is given an After Visit Summary that lists all medications with directions, allergies, education, orders placed during this encounter and follow-up instructions.      - I have reviewed the patient's medical information including past medical, family, and social history sections including the medications and allergies.      - We discussed the patient's current medications.     This note was created by combination of typed  and MModal dictation.  Transcription errors may be present.  If there are any questions, please contact me.

## 2023-06-21 ENCOUNTER — OFFICE VISIT (OUTPATIENT)
Dept: PODIATRY | Facility: CLINIC | Age: 53
End: 2023-06-21
Payer: COMMERCIAL

## 2023-06-21 VITALS — HEIGHT: 71 IN | BODY MASS INDEX: 30.06 KG/M2 | WEIGHT: 214.75 LBS

## 2023-06-21 DIAGNOSIS — E11.65 TYPE 2 DIABETES MELLITUS WITH HYPERGLYCEMIA, WITHOUT LONG-TERM CURRENT USE OF INSULIN: Primary | ICD-10-CM

## 2023-06-21 DIAGNOSIS — L84 CORN OR CALLUS: ICD-10-CM

## 2023-06-21 PROCEDURE — 99999 PR PBB SHADOW E&M-EST. PATIENT-LVL III: ICD-10-PCS | Mod: PBBFAC,,, | Performed by: PODIATRIST

## 2023-06-21 PROCEDURE — 99203 PR OFFICE/OUTPT VISIT, NEW, LEVL III, 30-44 MIN: ICD-10-PCS | Mod: S$GLB,,, | Performed by: PODIATRIST

## 2023-06-21 PROCEDURE — 3061F PR NEG MICROALBUMINURIA RESULT DOCUMENTED/REVIEW: ICD-10-PCS | Mod: CPTII,S$GLB,, | Performed by: PODIATRIST

## 2023-06-21 PROCEDURE — 1159F MED LIST DOCD IN RCRD: CPT | Mod: CPTII,S$GLB,, | Performed by: PODIATRIST

## 2023-06-21 PROCEDURE — 3008F PR BODY MASS INDEX (BMI) DOCUMENTED: ICD-10-PCS | Mod: CPTII,S$GLB,, | Performed by: PODIATRIST

## 2023-06-21 PROCEDURE — 3061F NEG MICROALBUMINURIA REV: CPT | Mod: CPTII,S$GLB,, | Performed by: PODIATRIST

## 2023-06-21 PROCEDURE — 3008F BODY MASS INDEX DOCD: CPT | Mod: CPTII,S$GLB,, | Performed by: PODIATRIST

## 2023-06-21 PROCEDURE — 3066F NEPHROPATHY DOC TX: CPT | Mod: CPTII,S$GLB,, | Performed by: PODIATRIST

## 2023-06-21 PROCEDURE — 3044F PR MOST RECENT HEMOGLOBIN A1C LEVEL <7.0%: ICD-10-PCS | Mod: CPTII,S$GLB,, | Performed by: PODIATRIST

## 2023-06-21 PROCEDURE — 99203 OFFICE O/P NEW LOW 30 MIN: CPT | Mod: S$GLB,,, | Performed by: PODIATRIST

## 2023-06-21 PROCEDURE — 99999 PR PBB SHADOW E&M-EST. PATIENT-LVL III: CPT | Mod: PBBFAC,,, | Performed by: PODIATRIST

## 2023-06-21 PROCEDURE — 3066F PR DOCUMENTATION OF TREATMENT FOR NEPHROPATHY: ICD-10-PCS | Mod: CPTII,S$GLB,, | Performed by: PODIATRIST

## 2023-06-21 PROCEDURE — 1159F PR MEDICATION LIST DOCUMENTED IN MEDICAL RECORD: ICD-10-PCS | Mod: CPTII,S$GLB,, | Performed by: PODIATRIST

## 2023-06-21 PROCEDURE — 3044F HG A1C LEVEL LT 7.0%: CPT | Mod: CPTII,S$GLB,, | Performed by: PODIATRIST

## 2023-06-22 NOTE — PROGRESS NOTES
Subjective:     Patient ID: Christophe Carroll is a 52 y.o. male.    Chief Complaint: Diabetes Mellitus and Routine Foot Care    Christophe is a 52 y.o. male who presents to the clinic upon referral from Dr. Valentin  for evaluation and treatment of diabetic feet. Christophe has a past medical history of Diabetes mellitus type II and Hypercholesteremia. Presents for diabetic foot risk assessment.       PCP: Moris Menchaca MD    Date Last Seen by PCP: 6/5/23    Current shoe gear: Tennis shoes    Hemoglobin A1C   Date Value Ref Range Status   06/02/2023 6.8 (H) 4.0 - 5.6 % Final     Comment:     ADA Screening Guidelines:  5.7-6.4%  Consistent with prediabetes  >or=6.5%  Consistent with diabetes    High levels of fetal hemoglobin interfere with the HbA1C  assay. Heterozygous hemoglobin variants (HbS, HgC, etc)do  not significantly interfere with this assay.   However, presence of multiple variants may affect accuracy.     03/31/2023 7.0 (H) 4.0 - 5.6 % Final     Comment:     ADA Screening Guidelines:  5.7-6.4%  Consistent with prediabetes  >or=6.5%  Consistent with diabetes    High levels of fetal hemoglobin interfere with the HbA1C  assay. Heterozygous hemoglobin variants (HbS, HgC, etc)do  not significantly interfere with this assay.   However, presence of multiple variants may affect accuracy.     11/02/2022 8.4 (H) 4.0 - 5.6 % Final     Comment:     ADA Screening Guidelines:  5.7-6.4%  Consistent with prediabetes  >or=6.5%  Consistent with diabetes    High levels of fetal hemoglobin interfere with the HbA1C  assay. Heterozygous hemoglobin variants (HbS, HgC, etc)do  not significantly interfere with this assay.   However, presence of multiple variants may affect accuracy.           Review of Systems   Constitutional: Negative for chills.   Cardiovascular:  Negative for chest pain and claudication.   Respiratory:  Negative for cough.    Skin:  Positive for color change, dry skin and nail changes.   Musculoskeletal:  Positive  for joint pain.   Gastrointestinal:  Negative for nausea.   Neurological:  Positive for paresthesias. Negative for numbness.   Psychiatric/Behavioral:  The patient is not nervous/anxious.       Objective:     Physical Exam  Constitutional:       Appearance: He is well-developed.      Comments: Oriented to time, place, and person.   Cardiovascular:      Comments: DP and PT pulses are palpable bilaterally. 3 sec capillary refill time and toes and feet are warm to touch proximally .  There is  hair growth on the feet and toes b/l. There is no edema b/l. No spider veins or varicosities present b/l.     Musculoskeletal:      Comments: Equinus noted b/l ankles with < 10 deg DF noted. MMT 5/5 in DF/PF/Inv/Ev resistance with no reproduction of pain in any direction. Passive range of motion of ankle and pedal joints is painless b/l.     Feet:      Right foot:      Skin integrity: No callus or dry skin.      Left foot:      Skin integrity: No callus or dry skin.   Lymphadenopathy:      Comments: Negative lymphadenopathy bilateral popliteal fossa and tarsal tunnel.   Skin:     Comments: No open lesions, lacerations or wounds noted.Interdigital spaces clean, dry and intact b/l. No erythema noted to b/l foot.  Nails normal color and trophic qualities.  Focal hyperkeratotic lesion consisting entirely of hyperkeratotic tissue without open skin, drainage, pus, fluctuance, malodor, or signs of infection: B/L 5th toe.   Interdigital maceration left 4th      Neurological:      Mental Status: He is alert.      Comments: Light touch, proprioception, and sharp/dull sensation are all intact bilaterally. Protective threshold with the Makaweli-Wienstein monofilament is intact bilaterally.    Psychiatric:         Behavior: Behavior is cooperative.         Assessment:      Encounter Diagnoses   Name Primary?    Type 2 diabetes mellitus with hyperglycemia, without long-term current use of insulin Yes    Corn or callus      Plan:     Christophe was  seen today for diabetes mellitus and routine foot care.    Diagnoses and all orders for this visit:    Type 2 diabetes mellitus with hyperglycemia, without long-term current use of insulin  -     Ambulatory referral/consult to Podiatry    Corn or callus      I counseled the patient on his conditions, their implications and medical management.        - Shoe inspection. Diabetic Foot Education. Patient reminded of the importance of good nutrition and blood sugar control to help prevent podiatric complications of diabetes. Patient instructed on proper foot hygeine. We discussed wearing proper shoe gear, daily foot inspections, never walking without protective shoe gear, caution putting sharp instruments to feet     - Discussed DM foot care:  Wear comfortable, proper fitting shoes. Wash feet daily. Dry well. After drying, apply moisturizer to feet (no lotion to webspaces). Inspect feet daily for skin breaks, blisters, swelling, or redness. Wear cotton socks (preferably white)  Change socks every day. Do NOT walk barefoot. Do NOT use heating pads or warm/hot water soaks     With the patient's verbal consent a sterile #=3 curette  was used to trim the hyperkeratotic lesion described above. He  tolerated the procedure well without complication.    Instructed on daily betadine application left 4th interdigital space.     RTC PRN

## 2023-06-26 ENCOUNTER — PATIENT MESSAGE (OUTPATIENT)
Dept: FAMILY MEDICINE | Facility: CLINIC | Age: 53
End: 2023-06-26
Payer: COMMERCIAL

## 2023-08-01 ENCOUNTER — CLINICAL SUPPORT (OUTPATIENT)
Dept: ENDOSCOPY | Facility: HOSPITAL | Age: 53
End: 2023-08-01
Attending: INTERNAL MEDICINE
Payer: COMMERCIAL

## 2023-08-01 ENCOUNTER — TELEPHONE (OUTPATIENT)
Dept: ENDOSCOPY | Facility: HOSPITAL | Age: 53
End: 2023-08-01

## 2023-08-01 VITALS — WEIGHT: 214 LBS | BODY MASS INDEX: 29.96 KG/M2 | HEIGHT: 71 IN

## 2023-08-01 DIAGNOSIS — Z12.11 SCREENING FOR COLON CANCER: ICD-10-CM

## 2023-08-01 NOTE — PLAN OF CARE
Spoke to patient to schedule procedure(s) Colonoscopy       Physician to perform procedure(s) Dr. PENELOPE Munguia  Date of Procedure (s) 10/12/23  Arrival Time 12:00 PM  Time of Procedure(s) 1:00 PM   Location of Procedure(s) [address]  Type of Rx Prep sent to patient: PEG  Instructions provided to patient via [facility]    Patient was informed on the following information and verbalized understanding. Screening questionnaire reviewed with patient and complete. If procedure requires anesthesia, a responsible adult needs to be present to accompany the patient home, patient cannot drive after receiving anesthesia. Appointment details are tentative, especially check-in time. Patient will receive a prep-op call 4 days prior to confirm check-in time for procedure. If applicable the patient should contact their pharmacy to verify Rx for procedure prep is ready for pick-up. Patient was advised to call the scheduling department at [phone] if pharmacy states no Rx is available. Patient was advised to call the endoscopy scheduling department if any questions or concerns arise.      [facility] Scheduling Department

## 2023-08-01 NOTE — TELEPHONE ENCOUNTER
Spoke to patient to schedule procedure(s) Colonoscopy       Physician to perform procedure(s) Dr. PENELOPE Munguia  Date of Procedure (s) 10/12/23  Arrival Time 12:00 PM  Time of Procedure(s) 1:00 PM   Location of Procedure(s) [address]  Type of Rx Prep sent to patient: PEG  Instructions provided to patient via [facility]    Patient was informed on the following information and verbalized understanding. Screening questionnaire reviewed with patient and complete. If procedure requires anesthesia, a responsible adult needs to be present to accompany the patient home, patient cannot drive after receiving anesthesia. Appointment details are tentative, especially check-in time. Patient will receive a prep-op call 4 days prior to confirm check-in time for procedure. If applicable the patient should contact their pharmacy to verify Rx for procedure prep is ready for pick-up. Patient was advised to call the scheduling department at [phone] if pharmacy states no Rx is available. Patient was advised to call the endoscopy scheduling department if any questions or concerns arise.      [facility] Scheduling Department

## 2023-08-03 DIAGNOSIS — E11.9 TYPE 2 DIABETES MELLITUS WITHOUT COMPLICATION, WITHOUT LONG-TERM CURRENT USE OF INSULIN: Chronic | ICD-10-CM

## 2023-08-03 NOTE — TELEPHONE ENCOUNTER
No care due was identified.  [facility] Embedded Care Due Messages. Reference number: [id number].   8/03/2023 11:53:45 AM CDT

## 2023-08-03 NOTE — TELEPHONE ENCOUNTER
Refill Routing Note   Medication(s) are not appropriate for processing by [facility] for the following reason(s):      Patient not seen by provider within 15 months    [facility] action(s):  Defer Care Due:  None identified     Medication Therapy Plan: FOVS 12/06/23      Appointments  past 12m or future 3m with PCP    Date Provider   Last Visit   4/1/2021 Moris Menchaca MD   Next Visit   12/6/2023 Moris Menchaca MD   ED visits in past 90 days: 0        Note composed:3:23 PM 08/03/2023

## 2023-08-04 RX ORDER — DULAGLUTIDE 3 MG/.5ML
INJECTION, SOLUTION SUBCUTANEOUS
Qty: 12 PEN | Refills: 1 | Status: SHIPPED | OUTPATIENT
Start: 2023-08-04 | End: 2023-12-07 | Stop reason: DRUGHIGH

## 2023-08-04 RX ORDER — METFORMIN HYDROCHLORIDE 500 MG/1
TABLET, EXTENDED RELEASE ORAL
Qty: 360 TABLET | Refills: 1 | Status: SHIPPED | OUTPATIENT
Start: 2023-08-04 | End: 2024-02-04

## 2023-10-11 ENCOUNTER — ANESTHESIA EVENT (OUTPATIENT)
Dept: ENDOSCOPY | Facility: HOSPITAL | Age: 53
End: 2023-10-11
Payer: COMMERCIAL

## 2023-10-11 RX ORDER — SODIUM CHLORIDE 9 MG/ML
INJECTION, SOLUTION INTRAVENOUS CONTINUOUS
Status: CANCELLED | OUTPATIENT
Start: 2023-10-11

## 2023-10-12 ENCOUNTER — ANESTHESIA (OUTPATIENT)
Dept: ENDOSCOPY | Facility: HOSPITAL | Age: 53
End: 2023-10-12
Payer: COMMERCIAL

## 2023-10-12 ENCOUNTER — HOSPITAL ENCOUNTER (OUTPATIENT)
Facility: HOSPITAL | Age: 53
Discharge: HOME | End: 2023-10-12
Attending: INTERNAL MEDICINE | Admitting: STUDENT IN AN ORGANIZED HEALTH CARE EDUCATION/TRAINING PROGRAM
Payer: COMMERCIAL

## 2023-10-12 VITALS
RESPIRATION RATE: 19 BRPM | SYSTOLIC BLOOD PRESSURE: 125 MMHG | HEART RATE: 72 BPM | DIASTOLIC BLOOD PRESSURE: 78 MMHG | TEMPERATURE: 98 F | OXYGEN SATURATION: 100 %

## 2023-10-12 DIAGNOSIS — Z12.11 SCREENING FOR COLON CANCER: ICD-10-CM

## 2023-10-12 LAB — POCT GLUCOSE: 91 MG/DL (ref 70–110)

## 2023-10-12 PROCEDURE — D9220A PRA ANESTHESIA: ICD-10-PCS | Mod: 33,ANES,, | Performed by: ANESTHESIOLOGY

## 2023-10-12 PROCEDURE — 27201089 HC SNARE, DISP (ANY): Performed by: STUDENT IN AN ORGANIZED HEALTH CARE EDUCATION/TRAINING PROGRAM

## 2023-10-12 PROCEDURE — 88305 TISSUE EXAM BY PATHOLOGIST: CPT | Mod: 26,,, | Performed by: PATHOLOGY

## 2023-10-12 PROCEDURE — 63600175 PHARM REV CODE 636 W HCPCS: Performed by: STUDENT IN AN ORGANIZED HEALTH CARE EDUCATION/TRAINING PROGRAM

## 2023-10-12 PROCEDURE — 37000009 HC ANESTHESIA EA ADD 15 MINS: Performed by: STUDENT IN AN ORGANIZED HEALTH CARE EDUCATION/TRAINING PROGRAM

## 2023-10-12 PROCEDURE — 45385 COLONOSCOPY W/LESION REMOVAL: CPT | Mod: 33,,, | Performed by: STUDENT IN AN ORGANIZED HEALTH CARE EDUCATION/TRAINING PROGRAM

## 2023-10-12 PROCEDURE — 45385 PR COLONOSCOPY,REMV LESN,SNARE: ICD-10-PCS | Mod: 33,,, | Performed by: STUDENT IN AN ORGANIZED HEALTH CARE EDUCATION/TRAINING PROGRAM

## 2023-10-12 PROCEDURE — D9220A PRA ANESTHESIA: Mod: 33,CRNA,, | Performed by: STUDENT IN AN ORGANIZED HEALTH CARE EDUCATION/TRAINING PROGRAM

## 2023-10-12 PROCEDURE — 25000003 PHARM REV CODE 250: Performed by: STUDENT IN AN ORGANIZED HEALTH CARE EDUCATION/TRAINING PROGRAM

## 2023-10-12 PROCEDURE — 88305 TISSUE EXAM BY PATHOLOGIST: CPT | Performed by: PATHOLOGY

## 2023-10-12 PROCEDURE — D9220A PRA ANESTHESIA: ICD-10-PCS | Mod: 33,CRNA,, | Performed by: STUDENT IN AN ORGANIZED HEALTH CARE EDUCATION/TRAINING PROGRAM

## 2023-10-12 PROCEDURE — D9220A PRA ANESTHESIA: Mod: 33,ANES,, | Performed by: ANESTHESIOLOGY

## 2023-10-12 PROCEDURE — 37000008 HC ANESTHESIA 1ST 15 MINUTES: Performed by: STUDENT IN AN ORGANIZED HEALTH CARE EDUCATION/TRAINING PROGRAM

## 2023-10-12 PROCEDURE — 88305 TISSUE EXAM BY PATHOLOGIST: ICD-10-PCS | Mod: 26,,, | Performed by: PATHOLOGY

## 2023-10-12 PROCEDURE — 45385 COLONOSCOPY W/LESION REMOVAL: CPT | Mod: PT | Performed by: STUDENT IN AN ORGANIZED HEALTH CARE EDUCATION/TRAINING PROGRAM

## 2023-10-12 RX ORDER — LIDOCAINE HYDROCHLORIDE 20 MG/ML
INJECTION INTRAVENOUS
Status: DISCONTINUED | OUTPATIENT
Start: 2023-10-12 | End: 2023-10-12

## 2023-10-12 RX ORDER — SODIUM CHLORIDE 9 MG/ML
INJECTION, SOLUTION INTRAVENOUS CONTINUOUS
Status: DISCONTINUED | OUTPATIENT
Start: 2023-10-12 | End: 2023-10-12 | Stop reason: HOSPADM

## 2023-10-12 RX ORDER — PROPOFOL 10 MG/ML
INJECTION, EMULSION INTRAVENOUS
Status: DISCONTINUED
Start: 2023-10-12 | End: 2023-10-12 | Stop reason: HOSPADM

## 2023-10-12 RX ORDER — PROPOFOL 10 MG/ML
VIAL (ML) INTRAVENOUS
Status: DISCONTINUED | OUTPATIENT
Start: 2023-10-12 | End: 2023-10-12

## 2023-10-12 RX ORDER — LIDOCAINE HYDROCHLORIDE 20 MG/ML
INJECTION, SOLUTION EPIDURAL; INFILTRATION; INTRACAUDAL; PERINEURAL
Status: DISCONTINUED
Start: 2023-10-12 | End: 2023-10-12 | Stop reason: HOSPADM

## 2023-10-12 RX ADMIN — PROPOFOL 50 MG: 10 INJECTION, EMULSION INTRAVENOUS at 01:10

## 2023-10-12 RX ADMIN — PROPOFOL 100 MG: 10 INJECTION, EMULSION INTRAVENOUS at 01:10

## 2023-10-12 RX ADMIN — SODIUM CHLORIDE: 0.9 INJECTION, SOLUTION INTRAVENOUS at 12:10

## 2023-10-12 RX ADMIN — PROPOFOL 30 MG: 10 INJECTION, EMULSION INTRAVENOUS at 01:10

## 2023-10-12 RX ADMIN — LIDOCAINE HYDROCHLORIDE 100 MG: 20 INJECTION, SOLUTION INTRAVENOUS at 01:10

## 2023-10-12 NOTE — TRANSFER OF CARE
Anesthesia Transfer of Care Note    Patient: Christophe Carroll    Procedure(s) Performed: Procedure(s) (LRB):  COLONOSCOPY (N/A)    Patient location: GI    Anesthesia Type: general    Transport from OR: Transported from OR on room air with adequate spontaneous ventilation    Post pain: adequate analgesia    Post assessment: no apparent anesthetic complications and tolerated procedure well    Post vital signs: stable    Level of consciousness: awake and alert    Nausea/Vomiting: no nausea/vomiting    Complications: none    Transfer of care protocol was followed      Last vitals:   Visit Vitals  /60 (BP Location: Left arm, Patient Position: Lying)   Pulse 80   Temp 36.5 °C (97.7 °F) (Oral)   Resp 15   SpO2 98%

## 2023-10-12 NOTE — ANESTHESIA PREPROCEDURE EVALUATION
10/12/2023  Christophe Carroll is a 53 y.o., male.  To undergo Procedure(s) (LRB):  COLONOSCOPY (N/A)     Denies CP/SOB/GERD/MI/CVA/URI symptoms.  METS > 4  NPO > 8    Past Medical History:  Past Medical History:   Diagnosis Date    Diabetes mellitus type II     Hypercholesteremia        Past Surgical History:  Past Surgical History:   Procedure Laterality Date    removal of epididymal cyst         Social History:  Social History     Socioeconomic History    Marital status:    Occupational History     Employer: [employer]   Tobacco Use    Smoking status: Never    Smokeless tobacco: Never   Substance and Sexual Activity    Alcohol use: Yes     Comment: On occasion...not even monthly. Mostly social gatherings.    Drug use: No    Sexual activity: Yes     Partners: Female     Comment:      Social Determinants of Health     Financial Resource Strain: Low Risk  (6/10/2020)    Overall Financial Resource Strain (CARDIA)     Difficulty of Paying Living Expenses: Not hard at all   Food Insecurity: No Food Insecurity (6/10/2020)    Hunger Vital Sign     Worried About Running Out of Food in the Last Year: Never true     Ran Out of Food in the Last Year: Never true   Transportation Needs: No Transportation Needs (6/10/2020)    PRAPARE - Transportation     Lack of Transportation (Medical): No     Lack of Transportation (Non-Medical): No   Physical Activity: Unknown (6/10/2020)    Exercise Vital Sign     Days of Exercise per Week: 0 days   Stress: No Stress Concern Present (6/10/2020)    [employer] - Occupational Stress Questionnaire     Feeling of Stress : Not at all   Social Connections: Unknown (6/10/2020)    Social Connection and Isolation Panel [NHANES]     Frequency of Communication with Friends and Family: More than three times a week        Medications:  Current Facility-Administered Medications on File Prior to Encounter   Medication Dose Route Frequency Provider Last Rate Last Admin    EPINEPHrine (EPIPEN) 0.3 mg/0.3 mL pen injection 0.3 mg  0.3 mg Intramuscular PRN Vargas Berger MD         Current Outpatient Medications on File Prior to Encounter   Medication Sig Dispense Refill    aspirin 81 MG Chew Take 1 tablet (81 mg total) by mouth once daily. 30 tablet 0    atorvastatin (LIPITOR) 10 MG tablet TAKE 1 TABLET(10 MG) BY MOUTH EVERY DAY 90 tablet 0    FLUZONE QUAD 3693-3902, PF, 60 mcg (15 mcg x 4)/0.5 mL Syrg       ID NOW COVID-19 TEST KIT Kit TEST AS DIRECTED TODAY      latanoprost 0.005 % ophthalmic solution       sars-cov-2, covid-19, (MODERNA COVID-19) 100 mcg/0.5 ml injection       SHINGRIX, PF, 50 mcg/0.5 mL injection       tadalafiL (CIALIS) 20 MG Tab Take 1 tablet (20 mg total) by mouth daily as needed (Erectile Dysfunction). 30 tablet 11    TRAVATAN Z 0.004 % Drop   6       Allergies:  Review of patient's allergies indicates:   Allergen Reactions    Lisinopril Rash    Penicillins Swelling       Active Problems:  Patient Active Problem List   Diagnosis    Type 2 diabetes mellitus without complication, without long-term current use of insulin    Hyperlipidemia associated with type 2 diabetes mellitus    Erectile dysfunction    Cervical radiculopathy    BMI 29.0-29.9,adult    Posture imbalance    Decreased muscle strength    Decreased ROM of neck       Diagnostic Studies:   Latest Reference Range & Units 06/02/23 08:38   WBC 3.90 - 12.70 K/uL 6.85   RBC 4.60 - 6.20 M/uL 5.83   Hemoglobin 14.0 - 18.0 g/dL 16.1   Hematocrit 40.0 - 54.0 % 51.3   MCV 82 - 98 fL 88   MCH 27.0 - 31.0 pg 27.6   MCHC 32.0 - 36.0 g/dL 31.4 (L)   RDW 11.5 - 14.5 % 13.2   Platelet Count 150 - 450 K/uL 220   MPV 9.2 - 12.9 fL 10.8   Gran % 38.0 - 73.0 % 53.1   Lymph % 18.0 - 48.0 % 35.3   Mono % 4.0 - 15.0 % 8.9   Eosinophil % 0.0 - 8.0 %  2.2   Basophil % 0.0 - 1.9 % 0.4   Immature Granulocytes 0.0 - 0.5 % 0.1   Gran # (ANC) 1.8 - 7.7 K/uL 3.6   Lymph # 1.0 - 4.8 K/uL 2.4   Mono # 0.3 - 1.0 K/uL 0.6   Eos # 0.0 - 0.5 K/uL 0.2   Baso # 0.00 - 0.20 K/uL 0.03   Immature Grans (Abs) 0.00 - 0.04 K/uL 0.01   nRBC 0 /100 WBC 0   Differential Method  Automated      Latest Reference Range & Units 06/02/23 08:38   Sodium 136 - 145 mmol/L 138   Potassium 3.5 - 5.1 mmol/L 4.8   Chloride 95 - 110 mmol/L 102   CO2 23 - 29 mmol/L 26   Anion Gap 8 - 16 mmol/L 10   BUN 6 - 20 mg/dL 9   Creatinine 0.5 - 1.4 mg/dL 1.0   eGFR >60 mL/min/1.73 m^2 >60.0     24 Hour Vitals:      See Nursing Charting For Additional Vitals    Pre-op Assessment    I have reviewed the Patient Summary Reports.     I have reviewed the Nursing Notes.       Review of Systems  Anesthesia Hx:  No problems with previous Anesthesia   Denies Personal Hx of Anesthesia complications.   Social:  Non-Smoker, Social Alcohol Use    Cardiovascular:   Exercise tolerance: good hyperlipidemia    Pulmonary:  Pulmonary Normal    Hepatic/GI:  Hepatic/GI Normal    Musculoskeletal:  Spine Disorders: cervical    Neurological:  Neurology Normal    Endocrine:   Diabetes Trulicity, last dose over a week ago Obesity / BMI > 30      Physical Exam  General: Well nourished and Cooperative    Airway:  Mallampati: III   Mouth Opening: Normal  TM Distance: Normal      Dental:  Intact    Chest/Lungs:  Clear to auscultation, Normal Respiratory Rate    Heart:  Rate: Normal  Rhythm: Regular Rhythm        Anesthesia Plan  Type of Anesthesia, risks & benefits discussed:    Anesthesia Type: Gen ETT, Gen Natural Airway, MAC  Intra-op Monitoring Plan: Standard ASA Monitors  Post Op Pain Control Plan: multimodal analgesia and IV/PO Opioids PRN  Induction:  IV  Informed Consent: Informed consent signed with the Patient and all parties understand the risks and agree with anesthesia plan.  All questions answered.   ASA Score: 2    Ready For  Surgery From Anesthesia Perspective.     .

## 2023-10-12 NOTE — H&P
Short Stay Endoscopy History and Physical    PCP - Moris Menchaca MD    Procedure - Colonoscopy  ASA - per anesthesia  Mallampati - per anesthesia  Plan of anesthesia - MAC    HPI:  This is a 53 y.o. male here for evaluation of : asymptomatic screening exam    ROS:  Constitutional: No fevers, chills  CV: No chest pain  Pulm: No cough  Ophtho: No vision changes  GI: see HPI  Derm: No rash    Medical History:  has a past medical history of Diabetes mellitus type II and Hypercholesteremia.    Surgical History:  has a past surgical history that includes removal of epididymal cyst.    Family History: family history includes Cancer in his mother; Diabetes in his maternal aunt, maternal grandmother, and mother; Kidney disease in his maternal aunt.. Otherwise no colon cancer, inflammatory bowel disease, or GI malignancies.    Social History:  reports that he has never smoked. He has never used smokeless tobacco. He reports current alcohol use. He reports that he does not use drugs.    Review of patient's allergies indicates:   Allergen Reactions    Lisinopril Rash    Penicillins Swelling       Medications:   Facility-Administered Medications Prior to Admission   Medication Dose Route Frequency Provider Last Rate Last Admin    EPINEPHrine (EPIPEN) 0.3 mg/0.3 mL pen injection 0.3 mg  0.3 mg Intramuscular PRN Vargas Berger MD         Medications Prior to Admission   Medication Sig Dispense Refill Last Dose    latanoprost 0.005 % ophthalmic solution    10/11/2023    aspirin 81 MG Chew Take 1 tablet (81 mg total) by mouth once daily. 30 tablet 0     atorvastatin (LIPITOR) 10 MG tablet TAKE 1 TABLET(10 MG) BY MOUTH EVERY DAY 90 tablet 0     dulaglutide (TRULICITY) 3 mg/0.5 mL pen injector INJECT 3 MG UNDER THE SKIN EVERY 7 DAYS 12 pen 1 10/1/2023    FLUZONE QUAD 9670-7004, PF, 60 mcg (15 mcg x 4)/0.5 mL Syrg        ID NOW COVID-19 TEST KIT Kit TEST AS DIRECTED TODAY       metFORMIN (GLUCOPHAGE-XR) 500 MG ER 24hr  tablet TAKE 2 TABLETS(1000 MG) BY MOUTH TWICE DAILY WITH MEALS 360 tablet 1 10/10/2023    sars-cov-2, covid-19, (MODERNA COVID-19) 100 mcg/0.5 ml injection        SHINGRIX, PF, 50 mcg/0.5 mL injection        tadalafiL (CIALIS) 20 MG Tab Take 1 tablet (20 mg total) by mouth daily as needed (Erectile Dysfunction). 30 tablet 11     TRAVATAN Z 0.004 % Drop   6          Vital Signs:   Vitals:    10/12/23 1211   BP: 128/72   Pulse: 77   Resp: 18   Temp: 97.7 °F (36.5 °C)       General Appearance: Well appearing in no acute distress  Eyes:    No scleral icterus  ENT: atraumatic  Abdomen: Soft, nondistended  Extremities: no tenderness  Skin: normal color    Labs:  Lab Results   Component Value Date    WBC 6.85 06/02/2023    HGB 16.1 06/02/2023    HCT 51.3 06/02/2023     06/02/2023    CHOL 107 (L) 06/02/2023    TRIG 131 06/02/2023    HDL 36 (L) 06/02/2023    ALT 21 06/02/2023    AST 21 06/02/2023     06/02/2023    K 4.8 06/02/2023     06/02/2023    CREATININE 1.0 06/02/2023    BUN 9 06/02/2023    CO2 26 06/02/2023    PSA 0.62 06/02/2023    INR 1.1 10/08/2019    GLUF 142 (H) 10/07/2010    HGBA1C 6.8 (H) 06/02/2023       I have explained the risks and benefits of endoscopy procedures to the patient/their POA including but not limited to bleeding, perforation, infection, and death.  The patient/their POA was asked if they understand and allowed to ask any further questions to their satisfaction.    Tanna Powers MD

## 2023-10-12 NOTE — ANESTHESIA POSTPROCEDURE EVALUATION
Anesthesia Post Evaluation    Patient: Christophe Carroll    Procedure(s) Performed: Procedure(s) (LRB):  COLONOSCOPY (N/A)    Final Anesthesia Type: general      Patient location during evaluation: GI PACU  Patient participation: Yes- Able to Participate  Level of consciousness: awake and alert and oriented  Post-procedure vital signs: reviewed and stable  Pain management: adequate  Airway patency: patent    PONV status at discharge: No PONV  Anesthetic complications: no      Cardiovascular status: hemodynamically stable and blood pressure returned to baseline  Respiratory status: spontaneous ventilation, room air and unassisted  Hydration status: euvolemic  Follow-up not needed.          Vitals Value Taken Time   /78 10/12/23 1354   Temp 36.5 °C (97.7 °F) 10/12/23 1324   Pulse 72 10/12/23 1354   Resp 19 10/12/23 1354   SpO2 100 % 10/12/23 1354         Event Time   Out of Recovery 13:54:53         Pain/Taylor Score: Taylor Score: 10 (10/12/2023  1:54 PM)

## 2023-10-12 NOTE — PROVATION PATIENT INSTRUCTIONS
Discharge Summary/Instructions after an Endoscopic Procedure  Patient Name: Christophe Carroll  Patient MRN: [id number]  Patient YOB: 1970 Thursday, October 12, 2023  Tanna Powers MD  Dear patient,  As a result of recent federal legislation (The Federal Cures Act), you may   receive lab or pathology results from your procedure in your [facility]   account before your physician is able to contact you. Your physician or   their representative will relay the results to you with their   recommendations at their soonest availability.  Thank you,  RESTRICTIONS:  During your procedure today, you received medications for sedation.  These   medications may affect your judgment, balance and coordination.  Therefore,   for 24 hours, you have the following restrictions:   - DO NOT drive a car, operate machinery, make legal/financial decisions,   sign important papers or drink alcohol.    ACTIVITY:  Today: no heavy lifting, straining or running due to procedural   sedation/anesthesia.  The following day: return to full activity including work.  DIET:  Eat and drink normally unless instructed otherwise.     TREATMENT FOR COMMON SIDE EFFECTS:  - Mild abdominal pain, nausea, belching, bloating or excessive gas:  rest,   eat lightly and use a heating pad.  - Sore Throat: treat with throat lozenges and/or gargle with warm salt   water.  - Because air was used during the procedure, expelling large amounts of air   from your rectum or belching is normal.  - If a bowel prep was taken, you may not have a bowel movement for 1-3 days.    This is normal.  SYMPTOMS TO WATCH FOR AND REPORT TO YOUR PHYSICIAN:  1. Abdominal pain or bloating, other than gas cramps.  2. Chest pain.  3. Back pain.  4. Signs of infection such as: chills or fever occurring within 24 hours   after the procedure.  5. Rectal bleeding, which would show as bright red, maroon, or black stools.   (A tablespoon of blood from the rectum is not serious, especially  if   hemorrhoids are present.)  6. Vomiting.  7. Weakness or dizziness.  GO DIRECTLY TO THE NEAREST EMERGENCY ROOM IF YOU HAVE ANY OF THE FOLLOWING:      Difficulty breathing              Chills and/or fever over 101 F   Persistent vomiting and/or vomiting blood   Severe abdominal pain   Severe chest pain   Black, tarry stools   Bleeding- more than one tablespoon   Any other symptom or condition that you feel may need urgent attention  Your doctor recommends these additional instructions:  If any biopsies were taken, your doctors clinic will contact you in 1 to 2   weeks with any results.  - Patient has a contact number available for emergencies.  The signs and   symptoms of potential delayed complications were discussed with the   patient.  Return to normal activities tomorrow.  Written discharge   instructions were provided to the patient.   - Discharge patient to home.   - Resume previous diet.   - Continue present medications.   - Await pathology results.   - Repeat colonoscopy in 7 years for surveillance.  For questions, problems or results please call your physician - Tanna Powers MD at Work:  [phone].  [facility] can be reached at [phone]     IF A COMPLICATION OR EMERGENCY SITUATION ARISES AND YOU ARE UNABLE TO REACH   YOUR PHYSICIAN - GO DIRECTLY TO THE EMERGENCY ROOM.  MD Tanna Hernadez MD  10/12/2023 2:06:44 PM  This report has been verified and signed electronically.  Dear patient,  As a result of recent federal legislation (The Federal Cures Act), you may   receive lab or pathology results from your procedure in your [facility]   account before your physician is able to contact you. Your physician or   their representative will relay the results to you with their   recommendations at their soonest availability.  Thank you,  PROVATION

## 2023-10-12 NOTE — PLAN OF CARE
Procedure and recovery complete. Pt awake and alert. MD and spouse at bedside, procedure findings and suggestions discussed. Discharge instructions given, pt verbalized understanding of instruction. Gait steady able to ambulate without assistance. Pt walked out accompanied by spouse.

## 2023-10-14 DIAGNOSIS — E11.69 HYPERLIPIDEMIA ASSOCIATED WITH TYPE 2 DIABETES MELLITUS: Chronic | ICD-10-CM

## 2023-10-14 DIAGNOSIS — E78.5 HYPERLIPIDEMIA ASSOCIATED WITH TYPE 2 DIABETES MELLITUS: Chronic | ICD-10-CM

## 2023-10-14 NOTE — TELEPHONE ENCOUNTER
Care Due:                  Date            Visit Type   Department     Provider  --------------------------------------------------------------------------------                                [facility]                              PRIMARY      MED/ INTERNAL  Last Visit: 11-      CARE ([facility])   MED/ PEDS      Yasmine RIVERS      [facility]                              FOLLOWUP/OF  MED/ INTERNAL  Next Visit: 12-      [facility] VISIT   MED/ PEDS      Moris Menchaca                                                            Last  Test          Frequency    Reason                     Performed    Due Date  --------------------------------------------------------------------------------    HBA1C.......  6 months...  dulaglutide, metFORMIN...  06- 11-    Health [facility] Embedded Care Due Messages. Reference number: [id number].   10/14/2023 5:41:44 AM CDT

## 2023-10-16 RX ORDER — ATORVASTATIN CALCIUM 10 MG/1
TABLET, FILM COATED ORAL
Qty: 90 TABLET | Refills: 1 | Status: SHIPPED | OUTPATIENT
Start: 2023-10-16 | End: 2024-02-04

## 2023-10-19 LAB
FINAL PATHOLOGIC DIAGNOSIS: NORMAL
Lab: NORMAL

## 2023-10-26 ENCOUNTER — PATIENT OUTREACH (OUTPATIENT)
Dept: PRIMARY CARE CLINIC | Facility: CLINIC | Age: 53
End: 2023-10-26
Payer: COMMERCIAL

## 2023-12-06 ENCOUNTER — LAB VISIT (OUTPATIENT)
Dept: LAB | Facility: HOSPITAL | Age: 53
End: 2023-12-06
Attending: INTERNAL MEDICINE
Payer: COMMERCIAL

## 2023-12-06 ENCOUNTER — OFFICE VISIT (OUTPATIENT)
Dept: FAMILY MEDICINE | Facility: CLINIC | Age: 53
End: 2023-12-06
Payer: COMMERCIAL

## 2023-12-06 VITALS
BODY MASS INDEX: 29.29 KG/M2 | HEART RATE: 90 BPM | TEMPERATURE: 98 F | HEIGHT: 71 IN | WEIGHT: 209.19 LBS | SYSTOLIC BLOOD PRESSURE: 120 MMHG | DIASTOLIC BLOOD PRESSURE: 72 MMHG | OXYGEN SATURATION: 97 %

## 2023-12-06 DIAGNOSIS — E78.5 HYPERLIPIDEMIA ASSOCIATED WITH TYPE 2 DIABETES MELLITUS: Chronic | ICD-10-CM

## 2023-12-06 DIAGNOSIS — Z23 FLU VACCINE NEED: ICD-10-CM

## 2023-12-06 DIAGNOSIS — E11.9 TYPE 2 DIABETES MELLITUS WITHOUT COMPLICATION, WITHOUT LONG-TERM CURRENT USE OF INSULIN: Primary | Chronic | ICD-10-CM

## 2023-12-06 DIAGNOSIS — E11.69 HYPERLIPIDEMIA ASSOCIATED WITH TYPE 2 DIABETES MELLITUS: Chronic | ICD-10-CM

## 2023-12-06 DIAGNOSIS — F52.4 PREMATURE EJACULATION: Chronic | ICD-10-CM

## 2023-12-06 DIAGNOSIS — E11.9 TYPE 2 DIABETES MELLITUS WITHOUT COMPLICATION, WITHOUT LONG-TERM CURRENT USE OF INSULIN: ICD-10-CM

## 2023-12-06 LAB
ESTIMATED AVG GLUCOSE: 154 MG/DL (ref 68–131)
HBA1C MFR BLD: 7 % (ref 4–5.6)

## 2023-12-06 PROCEDURE — 99999 PR PBB SHADOW E&M-EST. PATIENT-LVL III: CPT | Mod: PBBFAC,,, | Performed by: INTERNAL MEDICINE

## 2023-12-06 PROCEDURE — 3078F DIAST BP <80 MM HG: CPT | Mod: CPTII,S$GLB,, | Performed by: INTERNAL MEDICINE

## 2023-12-06 PROCEDURE — 90686 FLU VACCINE (QUAD) GREATER THAN OR EQUAL TO 3YO PRESERVATIVE FREE IM: ICD-10-PCS | Mod: S$GLB,,, | Performed by: INTERNAL MEDICINE

## 2023-12-06 PROCEDURE — 99999 PR PBB SHADOW E&M-EST. PATIENT-LVL III: ICD-10-PCS | Mod: PBBFAC,,, | Performed by: INTERNAL MEDICINE

## 2023-12-06 PROCEDURE — 1160F PR REVIEW ALL MEDS BY PRESCRIBER/CLIN PHARMACIST DOCUMENTED: ICD-10-PCS | Mod: CPTII,S$GLB,, | Performed by: INTERNAL MEDICINE

## 2023-12-06 PROCEDURE — G0008 ADMIN INFLUENZA VIRUS VAC: HCPCS | Mod: S$GLB,,, | Performed by: INTERNAL MEDICINE

## 2023-12-06 PROCEDURE — 3061F NEG MICROALBUMINURIA REV: CPT | Mod: CPTII,S$GLB,, | Performed by: INTERNAL MEDICINE

## 2023-12-06 PROCEDURE — 99214 PR OFFICE/OUTPT VISIT, EST, LEVL IV, 30-39 MIN: ICD-10-PCS | Mod: 25,S$GLB,, | Performed by: INTERNAL MEDICINE

## 2023-12-06 PROCEDURE — 3044F PR MOST RECENT HEMOGLOBIN A1C LEVEL <7.0%: ICD-10-PCS | Mod: CPTII,S$GLB,, | Performed by: INTERNAL MEDICINE

## 2023-12-06 PROCEDURE — 90686 IIV4 VACC NO PRSV 0.5 ML IM: CPT | Mod: S$GLB,,, | Performed by: INTERNAL MEDICINE

## 2023-12-06 PROCEDURE — 83036 HEMOGLOBIN GLYCOSYLATED A1C: CPT | Performed by: INTERNAL MEDICINE

## 2023-12-06 PROCEDURE — 3078F PR MOST RECENT DIASTOLIC BLOOD PRESSURE < 80 MM HG: ICD-10-PCS | Mod: CPTII,S$GLB,, | Performed by: INTERNAL MEDICINE

## 2023-12-06 PROCEDURE — 36415 COLL VENOUS BLD VENIPUNCTURE: CPT | Mod: PO | Performed by: INTERNAL MEDICINE

## 2023-12-06 PROCEDURE — 1159F PR MEDICATION LIST DOCUMENTED IN MEDICAL RECORD: ICD-10-PCS | Mod: CPTII,S$GLB,, | Performed by: INTERNAL MEDICINE

## 2023-12-06 PROCEDURE — 3066F PR DOCUMENTATION OF TREATMENT FOR NEPHROPATHY: ICD-10-PCS | Mod: CPTII,S$GLB,, | Performed by: INTERNAL MEDICINE

## 2023-12-06 PROCEDURE — 3066F NEPHROPATHY DOC TX: CPT | Mod: CPTII,S$GLB,, | Performed by: INTERNAL MEDICINE

## 2023-12-06 PROCEDURE — 3044F HG A1C LEVEL LT 7.0%: CPT | Mod: CPTII,S$GLB,, | Performed by: INTERNAL MEDICINE

## 2023-12-06 PROCEDURE — 3008F PR BODY MASS INDEX (BMI) DOCUMENTED: ICD-10-PCS | Mod: CPTII,S$GLB,, | Performed by: INTERNAL MEDICINE

## 2023-12-06 PROCEDURE — 3074F PR MOST RECENT SYSTOLIC BLOOD PRESSURE < 130 MM HG: ICD-10-PCS | Mod: CPTII,S$GLB,, | Performed by: INTERNAL MEDICINE

## 2023-12-06 PROCEDURE — 3061F PR NEG MICROALBUMINURIA RESULT DOCUMENTED/REVIEW: ICD-10-PCS | Mod: CPTII,S$GLB,, | Performed by: INTERNAL MEDICINE

## 2023-12-06 PROCEDURE — 3074F SYST BP LT 130 MM HG: CPT | Mod: CPTII,S$GLB,, | Performed by: INTERNAL MEDICINE

## 2023-12-06 PROCEDURE — 99214 OFFICE O/P EST MOD 30 MIN: CPT | Mod: 25,S$GLB,, | Performed by: INTERNAL MEDICINE

## 2023-12-06 PROCEDURE — 3008F BODY MASS INDEX DOCD: CPT | Mod: CPTII,S$GLB,, | Performed by: INTERNAL MEDICINE

## 2023-12-06 PROCEDURE — G0008 FLU VACCINE (QUAD) GREATER THAN OR EQUAL TO 3YO PRESERVATIVE FREE IM: ICD-10-PCS | Mod: S$GLB,,, | Performed by: INTERNAL MEDICINE

## 2023-12-06 PROCEDURE — 1160F RVW MEDS BY RX/DR IN RCRD: CPT | Mod: CPTII,S$GLB,, | Performed by: INTERNAL MEDICINE

## 2023-12-06 PROCEDURE — 1159F MED LIST DOCD IN RCRD: CPT | Mod: CPTII,S$GLB,, | Performed by: INTERNAL MEDICINE

## 2023-12-06 RX ORDER — PAROXETINE 10 MG/1
10 TABLET, FILM COATED ORAL
COMMUNITY
Start: 2023-10-31

## 2023-12-06 NOTE — ASSESSMENT & PLAN NOTE
Doing great.  Will get A1c drawn.  Would ideally like to maximize Trulicity dose to 4.5mg to see if we can wean his metformin dose to metformin XR 1000mg a day.

## 2023-12-06 NOTE — PROGRESS NOTES
Assessment & Plan  Problem List Items Addressed This Visit          Cardiac/Vascular    Hyperlipidemia associated with type 2 diabetes mellitus (Chronic)    Current Assessment & Plan     On statin. The current medical regimen is effective;  continue present plan and medications.             Renal/    Premature ejaculation (Chronic)    Current Assessment & Plan     Counseled recommendations to use paroxetine nightly.  I have discussed the common side effects of this(these) medication(s) and black box warnings (if applicable) with the patient and answered all of the questions they had at the time of this visit regarding this(these) medication(s).  Happy to refill this moving forward            Endocrine    Type 2 diabetes mellitus without complication, without long-term current use of insulin - Primary (Chronic)    Current Assessment & Plan     Doing great.  Will get A1c drawn.  Would ideally like to maximize Trulicity dose to 4.5mg to see if we can wean his metformin dose to metformin XR 1000mg a day.            Other Visit Diagnoses       Flu vaccine need        Relevant Orders    Influenza - Quadrivalent *Preferred* (6 months+) (PF)              Health Maintenance reviewed, as above.  Counseled on COVID boost.    Follow-up: Follow up in about 6 months (around 6/6/2024) for Routine Physical.  ______________________________________________________________________    Chief Complaint  Chief Complaint   Patient presents with    Diabetes     Follow up    Hyperlipidemia     Follow up       HPI  Christophe Carroll is a 53 y.o. male with medical diagnoses as listed in the medical history and problem list that presents for diabetes hyperlipidemia follow up.  Pt is known to me with their last appointment June 2023.      Taking and tolerating medications as prescribed without perceived side effects.  No CP, SOB, palpitations, hypoglycemic symptoms.     He is due for labs    He obtains some SSRI through an online resource  "for premature ejaculation.  He reports it is not really working.  He is taking this p.r.n..      ROS  Review of Systems        Physical Exam  Vitals:    12/06/23 1444   BP: 120/72   Pulse: 90   Temp: 97.9 °F (36.6 °C)   TempSrc: Oral   SpO2: 97%   Weight: 94.9 kg (209 lb 3.5 oz)   Height: 5' 11" (1.803 m)    Body mass index is 29.18 kg/m².  Weight: 94.9 kg (209 lb 3.5 oz)   Height: 5' 11" (180.3 cm)   Physical Exam  Constitutional:       General: He is not in acute distress.     Appearance: He is well-developed.   HENT:      Head: Normocephalic and atraumatic.   Eyes:      General: Lids are normal. No scleral icterus.     Conjunctiva/sclera: Conjunctivae normal.      Pupils: Pupils are equal, round, and reactive to light.   Neck:      Thyroid: No thyromegaly.      Vascular: No carotid bruit or JVD.   Cardiovascular:      Rate and Rhythm: Normal rate and regular rhythm.      Heart sounds: Normal heart sounds. No murmur heard.     No friction rub. No S3 or S4 sounds.   Pulmonary:      Effort: Pulmonary effort is normal.      Breath sounds: Normal breath sounds. No wheezing, rhonchi or rales.   Abdominal:      General: Bowel sounds are normal. There is no distension.      Palpations: Abdomen is soft.      Tenderness: There is no abdominal tenderness.   Musculoskeletal:         General: No tenderness.      Cervical back: Full passive range of motion without pain and neck supple.      Right lower leg: No edema.      Left lower leg: No edema.   Skin:     General: Skin is warm and dry.      Findings: No rash.   Neurological:      Mental Status: He is alert and oriented to person, place, and time.   Psychiatric:         Speech: Speech normal.         Behavior: Behavior normal.         Thought Content: Thought content normal.               "

## 2023-12-06 NOTE — Clinical Note
Sharri Amaral,  I just spoke with Mr. Carroll.  We're going to get his A1c drawn.  We would ideally like to maximize Trulicity dose to 4.5mg to see if we can wean his metformin dose to metformin XR 1000mg a day.    Thank you, Nathanael

## 2023-12-06 NOTE — ASSESSMENT & PLAN NOTE
Counseled recommendations to use paroxetine nightly.  I have discussed the common side effects of this(these) medication(s) and black box warnings (if applicable) with the patient and answered all of the questions they had at the time of this visit regarding this(these) medication(s).  Happy to refill this moving forward

## 2023-12-07 ENCOUNTER — TELEPHONE (OUTPATIENT)
Dept: FAMILY MEDICINE | Facility: CLINIC | Age: 53
End: 2023-12-07
Payer: COMMERCIAL

## 2023-12-07 DIAGNOSIS — E11.9 TYPE 2 DIABETES MELLITUS WITHOUT COMPLICATION, WITHOUT LONG-TERM CURRENT USE OF INSULIN: Primary | ICD-10-CM

## 2023-12-07 DIAGNOSIS — E11.9 TYPE 2 DIABETES MELLITUS WITHOUT COMPLICATION, WITHOUT LONG-TERM CURRENT USE OF INSULIN: Primary | Chronic | ICD-10-CM

## 2023-12-07 NOTE — TELEPHONE ENCOUNTER
Please let the patient know his A1c went up just a bit to 7.0. Continue with plan to increase Trulicity to 4.5 mg, looks like Dr. Menchaca sent that yesterday. Will keep Metformin at the same dose for now, and consider reducing if A1c drops at next check. Please schedule labs and follow-up with myself or Dr. Menchaca in 6 months.    Thanks!  Aaliyah JOHNSON

## 2024-01-03 ENCOUNTER — PATIENT MESSAGE (OUTPATIENT)
Dept: RESEARCH | Facility: CLINIC | Age: 54
End: 2024-01-03
Payer: COMMERCIAL

## 2024-01-30 ENCOUNTER — PATIENT MESSAGE (OUTPATIENT)
Dept: RESEARCH | Facility: CLINIC | Age: 54
End: 2024-01-30
Payer: COMMERCIAL

## 2024-02-03 DIAGNOSIS — E78.5 HYPERLIPIDEMIA ASSOCIATED WITH TYPE 2 DIABETES MELLITUS: Chronic | ICD-10-CM

## 2024-02-03 DIAGNOSIS — E11.9 TYPE 2 DIABETES MELLITUS WITHOUT COMPLICATION, WITHOUT LONG-TERM CURRENT USE OF INSULIN: Chronic | ICD-10-CM

## 2024-02-03 DIAGNOSIS — E11.69 HYPERLIPIDEMIA ASSOCIATED WITH TYPE 2 DIABETES MELLITUS: Chronic | ICD-10-CM

## 2024-02-03 NOTE — TELEPHONE ENCOUNTER
No care due was identified.  Health [facility] Embedded Care Due Messages. Reference number: [id number].   2/03/2024 5:45:54 AM CST

## 2024-02-04 RX ORDER — METFORMIN HYDROCHLORIDE 500 MG/1
TABLET, EXTENDED RELEASE ORAL
Qty: 360 TABLET | Refills: 1 | Status: SHIPPED | OUTPATIENT
Start: 2024-02-04

## 2024-02-04 RX ORDER — ATORVASTATIN CALCIUM 10 MG/1
10 TABLET, FILM COATED ORAL
Qty: 90 TABLET | Refills: 1 | Status: SHIPPED | OUTPATIENT
Start: 2024-02-04

## 2024-02-04 NOTE — TELEPHONE ENCOUNTER
Refill Decision Note   Christophe Carroll  is requesting a refill authorization.  Brief Assessment and Rationale for Refill:  Approve     Medication Therapy Plan:         Comments:     Note composed:3:12 AM 02/04/2024

## 2024-02-27 NOTE — PROGRESS NOTES
Follow up call made to Susan's office about a eye requested in 07/2019. I spoke Oswaldo, he will fax over the report today.   No

## 2024-04-30 ENCOUNTER — OFFICE VISIT (OUTPATIENT)
Dept: INTERNAL MEDICINE | Facility: CLINIC | Age: 54
End: 2024-04-30
Payer: COMMERCIAL

## 2024-04-30 ENCOUNTER — HOSPITAL ENCOUNTER (OUTPATIENT)
Dept: RADIOLOGY | Facility: HOSPITAL | Age: 54
Discharge: HOME | End: 2024-04-30
Attending: INTERNAL MEDICINE
Payer: COMMERCIAL

## 2024-04-30 VITALS
SYSTOLIC BLOOD PRESSURE: 122 MMHG | DIASTOLIC BLOOD PRESSURE: 60 MMHG | WEIGHT: 211.31 LBS | HEIGHT: 71 IN | HEART RATE: 100 BPM | BODY MASS INDEX: 29.58 KG/M2 | OXYGEN SATURATION: 98 %

## 2024-04-30 DIAGNOSIS — M75.42 SHOULDER IMPINGEMENT SYNDROME, LEFT: ICD-10-CM

## 2024-04-30 DIAGNOSIS — E11.69 HYPERLIPIDEMIA ASSOCIATED WITH TYPE 2 DIABETES MELLITUS: ICD-10-CM

## 2024-04-30 DIAGNOSIS — M75.42 SHOULDER IMPINGEMENT SYNDROME, LEFT: Primary | ICD-10-CM

## 2024-04-30 DIAGNOSIS — E78.5 HYPERLIPIDEMIA ASSOCIATED WITH TYPE 2 DIABETES MELLITUS: ICD-10-CM

## 2024-04-30 PROCEDURE — 73030 X-RAY EXAM OF SHOULDER: CPT | Mod: 26,LT,, | Performed by: RADIOLOGY

## 2024-04-30 PROCEDURE — 99999 PR PBB SHADOW E&M-EST. PATIENT-LVL V: CPT | Mod: PBBFAC,,, | Performed by: INTERNAL MEDICINE

## 2024-04-30 PROCEDURE — 3008F BODY MASS INDEX DOCD: CPT | Mod: CPTII,S$GLB,, | Performed by: INTERNAL MEDICINE

## 2024-04-30 PROCEDURE — 73030 X-RAY EXAM OF SHOULDER: CPT | Mod: TC,LT

## 2024-04-30 PROCEDURE — 99214 OFFICE O/P EST MOD 30 MIN: CPT | Mod: 25,S$GLB,, | Performed by: INTERNAL MEDICINE

## 2024-04-30 PROCEDURE — 1160F RVW MEDS BY RX/DR IN RCRD: CPT | Mod: CPTII,S$GLB,, | Performed by: INTERNAL MEDICINE

## 2024-04-30 PROCEDURE — 3078F DIAST BP <80 MM HG: CPT | Mod: CPTII,S$GLB,, | Performed by: INTERNAL MEDICINE

## 2024-04-30 PROCEDURE — 3074F SYST BP LT 130 MM HG: CPT | Mod: CPTII,S$GLB,, | Performed by: INTERNAL MEDICINE

## 2024-04-30 PROCEDURE — 96372 THER/PROPH/DIAG INJ SC/IM: CPT | Mod: S$GLB,,, | Performed by: INTERNAL MEDICINE

## 2024-04-30 PROCEDURE — 1159F MED LIST DOCD IN RCRD: CPT | Mod: CPTII,S$GLB,, | Performed by: INTERNAL MEDICINE

## 2024-04-30 RX ORDER — MELOXICAM 15 MG/1
15 TABLET ORAL DAILY PRN
Qty: 30 TABLET | Refills: 0 | Status: SHIPPED | OUTPATIENT
Start: 2024-04-30 | End: 2024-06-03

## 2024-04-30 RX ORDER — KETOROLAC TROMETHAMINE 30 MG/ML
60 INJECTION, SOLUTION INTRAMUSCULAR; INTRAVENOUS
Status: COMPLETED | OUTPATIENT
Start: 2024-04-30 | End: 2024-04-30

## 2024-04-30 RX ADMIN — KETOROLAC TROMETHAMINE 60 MG: 30 INJECTION, SOLUTION INTRAMUSCULAR; INTRAVENOUS at 03:04

## 2024-04-30 NOTE — PROGRESS NOTES
Assessment & Plan  Problem List Items Addressed This Visit          Cardiac/Vascular    Hyperlipidemia associated with type 2 diabetes mellitus (Chronic)    Current Assessment & Plan     On atorvastatin.  The current medical regimen is effective;  continue present plan and medications.           Other Visit Diagnoses       Shoulder impingement syndrome, left    -  Primary  -    Toradol now.  We will check x-ray to ensure there has no signs of AC separation.  I counseled the patient on icing the shoulder starting Mobic tomorrow once daily with food and provided him with some home physical therapy exercises.    Next steps referral to Orthopedics for evaluation for corticosteroid injection    Relevant Medications    ketorolac injection 60 mg (Start on 4/30/2024  3:15 PM)    meloxicam (MOBIC) 15 MG tablet    Other Relevant Orders    X-Ray Shoulder 2 or More Views Left              Health Maintenance reviewed, deferred.    Follow-up: Follow up if symptoms worsen or fail to improve.  ______________________________________________________________________    Chief Complaint  Chief Complaint   Patient presents with    Shoulder Pain     Left       HPI  Christophe Carroll is a 53 y.o. male with medical diagnoses as listed in the medical history and problem list that presents for left shoulder pain x 1-2 mo.  Pt is known to me with their last appointment December 2023.      Has had some intermittent left shoulder pain but normally gets better with working out.      He thinks this episode started after doing dips 1-2 months ago.   Has lots of pain with overhead press.  Particularly with weight but also without any additional weight.  Pain is anterior and at the AC joint.  Not the same as the neck pain. No noticeable deformity to him.  Reaching behind with internal rotation is also very painful.  No weakness or numbness in the arm. He is RHD.        ROS  Review of Systems        Physical Exam  Vitals:    04/30/24 1448   BP:  "122/60   BP Location: Left arm   Patient Position: Sitting   BP Method: Large (Manual)   Pulse: 100   SpO2: 98%   Weight: 95.8 kg (211 lb 5 oz)   Height: 5' 11" (1.803 m)    Body mass index is 29.47 kg/m².  Weight: 95.8 kg (211 lb 5 oz)   Height: 5' 11" (180.3 cm)   Physical Exam  Constitutional:       General: He is not in acute distress.     Appearance: Normal appearance. He is well-developed.   HENT:      Head: Normocephalic and atraumatic.   Eyes:      Extraocular Movements: Extraocular movements intact.      Conjunctiva/sclera: Conjunctivae normal.   Pulmonary:      Effort: Pulmonary effort is normal. No respiratory distress.   Musculoskeletal:      Right shoulder: Normal.      Left shoulder: Tenderness and bony tenderness present. No swelling or crepitus. Decreased range of motion.      Cervical back: Normal range of motion.      Comments: Shoulder: no warmth, erythema, or TTP of clavicle, AC joint, lateral deltoid.  No TTP of biceps tendon.  Negative radial arc and drop arm.  No pain with cross-body AC joint loading.  Negative shoulder apprehension test.  Negative speed test.  Positive Neer test. Positive Knight     Neurological:      General: No focal deficit present.      Mental Status: He is alert and oriented to person, place, and time.   Psychiatric:         Mood and Affect: Mood and affect normal.         Behavior: Behavior normal.         Thought Content: Thought content normal.         Judgment: Judgment normal.               "

## 2024-05-01 NOTE — PROGRESS NOTES
This is showing only a small amount of arthritis which would not cause your symptoms. You do not need any change in your medications or treatment plan at this time.     Sincerely,  Moris Menchaca MD

## 2024-06-03 DIAGNOSIS — M75.42 SHOULDER IMPINGEMENT SYNDROME, LEFT: ICD-10-CM

## 2024-06-03 RX ORDER — MELOXICAM 15 MG/1
15 TABLET ORAL DAILY PRN
Qty: 30 TABLET | Refills: 0 | Status: SHIPPED | OUTPATIENT
Start: 2024-06-03

## 2024-06-03 NOTE — TELEPHONE ENCOUNTER
Care Due:                  Date            Visit Type   Department     Provider  --------------------------------------------------------------------------------                                EP -                              PRIMARY      [facility]  Last Visit: 04-      CARE ([facility])   MEDICINE       Moris Menchaca  Next Visit: None Scheduled  None         None Found                                                            Last  Test          Frequency    Reason                     Performed    Due Date  --------------------------------------------------------------------------------    CBC.........  12 months..  meloxicam................  06- 05-    CMP.........  12 months..  atorvastatin, meloxicam,   06- 05-                             metFORMIN................    HBA1C.......  6 months...  metFORMIN................  12- 06-    Lipid Panel.  12 months..  atorvastatin.............  06- 05-    [facility] Embedded Care Due Messages. Reference number: [id number].   6/03/2024 11:30:17 AM CDT

## 2024-06-03 NOTE — TELEPHONE ENCOUNTER
Refill Routing Note   Medication(s) are not appropriate for processing by [facility] for the following reason(s):        Outside of protocol    [facility] action(s):  Route     Requires labs : Yes             Appointments  past 12m or future 3m with PCP    Date Provider   Last Visit   4/30/2024 Moris Menchaca MD   Next Visit   Visit date not found Moris Menchaca MD   ED visits in past 90 days: 0        Note composed:12:10 PM 06/03/2024

## 2024-06-07 ENCOUNTER — LAB VISIT (OUTPATIENT)
Dept: LAB | Facility: HOSPITAL | Age: 54
End: 2024-06-07
Attending: INTERNAL MEDICINE
Payer: COMMERCIAL

## 2024-06-07 DIAGNOSIS — E11.9 TYPE 2 DIABETES MELLITUS WITHOUT COMPLICATION, WITHOUT LONG-TERM CURRENT USE OF INSULIN: ICD-10-CM

## 2024-06-07 LAB
ESTIMATED AVG GLUCOSE: 146 MG/DL (ref 68–131)
HBA1C MFR BLD: 6.7 % (ref 4–5.6)

## 2024-06-07 PROCEDURE — 83036 HEMOGLOBIN GLYCOSYLATED A1C: CPT | Performed by: INTERNAL MEDICINE

## 2024-06-07 PROCEDURE — 36415 COLL VENOUS BLD VENIPUNCTURE: CPT | Mod: PO | Performed by: INTERNAL MEDICINE

## 2024-06-10 ENCOUNTER — PATIENT MESSAGE (OUTPATIENT)
Dept: INTERNAL MEDICINE | Facility: CLINIC | Age: 54
End: 2024-06-10
Payer: COMMERCIAL

## 2024-06-11 ENCOUNTER — OFFICE VISIT (OUTPATIENT)
Dept: INTERNAL MEDICINE | Facility: CLINIC | Age: 54
End: 2024-06-11
Payer: COMMERCIAL

## 2024-06-11 VITALS
SYSTOLIC BLOOD PRESSURE: 126 MMHG | HEART RATE: 102 BPM | WEIGHT: 215.19 LBS | BODY MASS INDEX: 30.13 KG/M2 | OXYGEN SATURATION: 98 % | DIASTOLIC BLOOD PRESSURE: 80 MMHG | HEIGHT: 71 IN

## 2024-06-11 DIAGNOSIS — E11.69 HYPERLIPIDEMIA ASSOCIATED WITH TYPE 2 DIABETES MELLITUS: Chronic | ICD-10-CM

## 2024-06-11 DIAGNOSIS — T63.481A INSECT STINGS, ACCIDENTAL OR UNINTENTIONAL, INITIAL ENCOUNTER: ICD-10-CM

## 2024-06-11 DIAGNOSIS — E78.5 HYPERLIPIDEMIA ASSOCIATED WITH TYPE 2 DIABETES MELLITUS: Chronic | ICD-10-CM

## 2024-06-11 DIAGNOSIS — M54.12 CERVICAL RADICULOPATHY: Chronic | ICD-10-CM

## 2024-06-11 DIAGNOSIS — E11.9 TYPE 2 DIABETES MELLITUS WITHOUT COMPLICATION, WITHOUT LONG-TERM CURRENT USE OF INSULIN: Primary | Chronic | ICD-10-CM

## 2024-06-11 PROCEDURE — 99214 OFFICE O/P EST MOD 30 MIN: CPT | Mod: S$GLB,,, | Performed by: NURSE PRACTITIONER

## 2024-06-11 PROCEDURE — 3008F BODY MASS INDEX DOCD: CPT | Mod: CPTII,S$GLB,, | Performed by: NURSE PRACTITIONER

## 2024-06-11 PROCEDURE — 3074F SYST BP LT 130 MM HG: CPT | Mod: CPTII,S$GLB,, | Performed by: NURSE PRACTITIONER

## 2024-06-11 PROCEDURE — 1160F RVW MEDS BY RX/DR IN RCRD: CPT | Mod: CPTII,S$GLB,, | Performed by: NURSE PRACTITIONER

## 2024-06-11 PROCEDURE — 3066F NEPHROPATHY DOC TX: CPT | Mod: CPTII,S$GLB,, | Performed by: NURSE PRACTITIONER

## 2024-06-11 PROCEDURE — 99999 PR PBB SHADOW E&M-EST. PATIENT-LVL IV: CPT | Mod: PBBFAC,,, | Performed by: NURSE PRACTITIONER

## 2024-06-11 PROCEDURE — 1159F MED LIST DOCD IN RCRD: CPT | Mod: CPTII,S$GLB,, | Performed by: NURSE PRACTITIONER

## 2024-06-11 PROCEDURE — 3079F DIAST BP 80-89 MM HG: CPT | Mod: CPTII,S$GLB,, | Performed by: NURSE PRACTITIONER

## 2024-06-11 PROCEDURE — 3061F NEG MICROALBUMINURIA REV: CPT | Mod: CPTII,S$GLB,, | Performed by: NURSE PRACTITIONER

## 2024-06-11 PROCEDURE — 3044F HG A1C LEVEL LT 7.0%: CPT | Mod: CPTII,S$GLB,, | Performed by: NURSE PRACTITIONER

## 2024-06-11 RX ORDER — DULAGLUTIDE 4.5 MG/.5ML
4.5 INJECTION, SOLUTION SUBCUTANEOUS
Qty: 12 PEN | Refills: 1 | Status: SHIPPED | OUTPATIENT
Start: 2024-06-11 | End: 2025-06-11

## 2024-06-11 NOTE — PROGRESS NOTES
History of Present Illness   Christophe Carroll is a 53 y.o. man with medical history as listed below who presents today for routine follow-up, T2DM. He did have labs prior to our visit with reduction in A1c from 7 to 6.7. He is taking medications as prescribed without perceived SE. He does not monitor his glucose at home but denies symptoms of hypoglycemia. Diet recall: has made several dietary changes and is exercising regularly. Complaints today: continues to have some left shoulder pain and left sided neck pain although this has improved. Pertinent negatives as listed in ROS. We will address HM today.    Past Medical History:   Diagnosis Date    Diabetes mellitus type II     Hypercholesteremia        Past Surgical History:   Procedure Laterality Date    COLONOSCOPY N/A 10/12/2023    Procedure: COLONOSCOPY;  Surgeon: Tanna Powers MD;  Location: [facility];  Service: Endoscopy;  Laterality: N/A;  instructions sent to patient portal.TB  referral; Dr. Menchaca  Pt diabetic: Takes Trulicity ( instructed to hold for 7 days)  ally [facility]RE[facility]A [facility] to [facility]    removal of epididymal cyst         Social History     Socioeconomic History    Marital status:    Occupational History     Employer: [employer]   Tobacco Use    Smoking status: Never    Smokeless tobacco: Never   Substance and Sexual Activity    Alcohol use: Yes     Comment: On occasion...not even monthly. Mostly social gatherings.    Drug use: No    Sexual activity: Yes     Partners: Female     Comment:      Social Determinants of Health     Financial Resource Strain: Low Risk  (4/30/2024)    Overall Financial Resource Strain (CARDIA)     Difficulty of Paying Living Expenses: Not hard at all   Food Insecurity: No Food Insecurity (4/30/2024)    Hunger Vital Sign     Worried About Running Out of Food in the Last Year: Never true     Ran Out of Food in the Last Year: Never true   Transportation Needs: No Transportation Needs (4/30/2024)     "PRAPARE - Transportation     Lack of Transportation (Medical): No     Lack of Transportation (Non-Medical): No   Physical Activity: Sufficiently Active (4/30/2024)    Exercise Vital Sign     Days of Exercise per Week: 5 days     Minutes of Exercise per Session: 50 min   Stress: No Stress Concern Present (4/30/2024)    [employer] [place] [employer] - Occupational Stress Questionnaire     Feeling of Stress : Only a little   Housing Stability: Unknown (4/30/2024)    Housing Stability Vital Sign     Unable to Pay for Housing in the Last Year: No       Family History   Problem Relation Name Age of Onset    Diabetes Mother Will Khanna     Cancer Mother Will Khanna         lung with mets to brain    Diabetes Maternal Aunt Moni Keen     Kidney disease Maternal Aunt Moni Keen     Diabetes Maternal Grandmother Page Keen        Review of Systems  Review of Systems   Constitutional:  Negative for malaise/fatigue and weight loss.   Eyes:  Negative for blurred vision and double vision.   Respiratory:  Negative for shortness of breath.    Cardiovascular:  Negative for chest pain, palpitations, orthopnea, claudication, leg swelling and PND.   Gastrointestinal:  Negative for blood in stool and melena.   Genitourinary:  Negative for flank pain and hematuria.   Musculoskeletal:  Positive for joint pain and neck pain.   Neurological:  Negative for dizziness, loss of consciousness and headaches.   Endo/Heme/Allergies:  Negative for polydipsia.     A complete review of systems was otherwise negative.    Physical Exam  /80 (BP Location: Left arm, Patient Position: Sitting, BP Method: Large (Manual))   Pulse 102   Ht 5' 11" (1.803 m)   Wt 97.6 kg (215 lb 2.7 oz)   SpO2 98%   BMI 30.01 kg/m²   General appearance: alert, appears stated age, cooperative, and no distress  Neck: no adenopathy, no carotid bruit, no JVD, supple, symmetrical, trachea midline, and thyroid not enlarged, symmetric, no " tenderness/mass/nodules  Back: symmetric, no curvature. ROM normal. No CVA tenderness.  Lungs: clear to auscultation bilaterally  Heart: regular rate and rhythm, S1, S2 normal, no murmur, click, rub or gallop  Extremities: extremities normal, atraumatic, no cyanosis or edema  Pulses: 2+ and symmetric  Skin: Skin color, texture, turgor normal. No rashes or lesions  Neurologic: Grossly normal    Assessment/Plan  Type 2 diabetes mellitus without complication, without long-term current use of insulin  The current medical regimen is effective;  continue present plan and medications.  Keep up the great work with diet and exercise.  Labs and follow-up in 6 months.  -     dulaglutide (TRULICITY) 4.5 mg/0.5 mL pen injector; Inject 4.5 mg into the skin every 7 days. (Increased dose)  Dispense: 12 pen ; Refill: 1  -     Hemoglobin A1C; Future; Expected date: 12/13/2024  -     BASIC METABOLIC PANEL; Future; Expected date: 12/13/2024    Hyperlipidemia associated with type 2 diabetes mellitus  The current medical regimen is effective;  continue present plan and medications.  Statin therapy.    BMI 29.0-29.9,adult  The patient is asked to make an attempt to improve diet and exercise patterns to aid in medical management of this problem. We specifically discussed cutting calorie intake by 500-1000 calories per day for a goal of a 1-2 pound weight loss per week and recommendations for a mostly plant based diet with limited red meats/refined grains/processed foods/added sugars.    Cervical radiculopathy  The current medical regimen is effective;  continue present plan and medications.  Prefers to continue with HEP at this time, if pain becomes more consistent/worsens, referral to PT.    Insect stings, accidental or unintentional, initial encounter  Healing well, no further treatment indicated.    Patient has verbalized understanding and is in agreement with plan of care.    Follow up in about 6 months (around 12/11/2024) for labs and  follow-up.

## 2024-06-12 ENCOUNTER — LAB VISIT (OUTPATIENT)
Dept: LAB | Facility: HOSPITAL | Age: 54
End: 2024-06-12
Attending: INTERNAL MEDICINE
Payer: COMMERCIAL

## 2024-06-12 DIAGNOSIS — E11.9 TYPE 2 DIABETES MELLITUS WITHOUT COMPLICATION, WITHOUT LONG-TERM CURRENT USE OF INSULIN: ICD-10-CM

## 2024-06-12 LAB
ALBUMIN/CREAT UR: NORMAL UG/MG (ref 0–30)
ANION GAP SERPL CALC-SCNC: 9 MMOL/L (ref 8–16)
BUN SERPL-MCNC: 10 MG/DL (ref 6–20)
CALCIUM SERPL-MCNC: 9.8 MG/DL (ref 8.7–10.5)
CHLORIDE SERPL-SCNC: 101 MMOL/L (ref 95–110)
CHOLEST SERPL-MCNC: 173 MG/DL (ref 120–199)
CHOLEST/HDLC SERPL: 4 {RATIO} (ref 2–5)
CO2 SERPL-SCNC: 25 MMOL/L (ref 23–29)
CREAT SERPL-MCNC: 1.2 MG/DL (ref 0.5–1.4)
CREAT UR-MCNC: 132 MG/DL (ref 23–375)
EST. GFR  (NO RACE VARIABLE): >60 ML/MIN/1.73 M^2
ESTIMATED AVG GLUCOSE: 146 MG/DL (ref 68–131)
GLUCOSE SERPL-MCNC: 153 MG/DL (ref 70–110)
HBA1C MFR BLD: 6.7 % (ref 4–5.6)
HDLC SERPL-MCNC: 43 MG/DL (ref 40–75)
HDLC SERPL: 24.9 % (ref 20–50)
LDLC SERPL CALC-MCNC: 99.4 MG/DL (ref 63–159)
MICROALBUMIN UR DL<=1MG/L-MCNC: <5 UG/ML
NONHDLC SERPL-MCNC: 130 MG/DL
POTASSIUM SERPL-SCNC: 4.4 MMOL/L (ref 3.5–5.1)
SODIUM SERPL-SCNC: 135 MMOL/L (ref 136–145)
TRIGL SERPL-MCNC: 153 MG/DL (ref 30–150)

## 2024-06-12 PROCEDURE — 80061 LIPID PANEL: CPT | Performed by: NURSE PRACTITIONER

## 2024-06-12 PROCEDURE — 36415 COLL VENOUS BLD VENIPUNCTURE: CPT | Mod: PO | Performed by: NURSE PRACTITIONER

## 2024-06-12 PROCEDURE — 83036 HEMOGLOBIN GLYCOSYLATED A1C: CPT | Performed by: INTERNAL MEDICINE

## 2024-06-12 PROCEDURE — 82043 UR ALBUMIN QUANTITATIVE: CPT | Performed by: NURSE PRACTITIONER

## 2024-06-12 PROCEDURE — 80048 BASIC METABOLIC PNL TOTAL CA: CPT | Performed by: NURSE PRACTITIONER

## 2024-06-12 PROCEDURE — 82570 ASSAY OF URINE CREATININE: CPT | Performed by: NURSE PRACTITIONER

## 2024-07-07 DIAGNOSIS — M75.42 SHOULDER IMPINGEMENT SYNDROME, LEFT: ICD-10-CM

## 2024-07-07 NOTE — TELEPHONE ENCOUNTER
No care due was identified.  [facility] Embedded Care Due Messages. Reference number: [id number].   7/07/2024 2:49:34 PM CDT

## 2024-07-08 RX ORDER — MELOXICAM 15 MG/1
15 TABLET ORAL DAILY PRN
Qty: 30 TABLET | Refills: 4 | Status: SHIPPED | OUTPATIENT
Start: 2024-07-08

## 2024-07-08 NOTE — TELEPHONE ENCOUNTER
Refill Routing Note   Medication(s) are not appropriate for processing by [facility] for the following reason(s):        Outside of protocol    [facility] action(s):  Route               Appointments  past 12m or future 3m with PCP    Date Provider   Last Visit   4/30/2024 Moris Menchaca MD   Next Visit   Visit date not found Moris Menchaca MD   ED visits in past 90 days: 0        Note composed:9:06 AM 07/08/2024

## 2024-11-25 ENCOUNTER — OFFICE VISIT (OUTPATIENT)
Dept: PODIATRY | Facility: CLINIC | Age: 54
End: 2024-11-25
Payer: COMMERCIAL

## 2024-11-25 VITALS — BODY MASS INDEX: 30.13 KG/M2 | WEIGHT: 215.19 LBS | HEIGHT: 71 IN

## 2024-11-25 DIAGNOSIS — E11.65 TYPE 2 DIABETES MELLITUS WITH HYPERGLYCEMIA, WITHOUT LONG-TERM CURRENT USE OF INSULIN: Primary | ICD-10-CM

## 2024-11-25 DIAGNOSIS — S90.221A SUBUNGUAL HEMATOMA OF TOE OF RIGHT FOOT, INITIAL ENCOUNTER: ICD-10-CM

## 2024-11-25 PROCEDURE — 99999 PR PBB SHADOW E&M-EST. PATIENT-LVL III: CPT | Mod: PBBFAC,,, | Performed by: PODIATRIST

## 2024-11-25 PROCEDURE — 99213 OFFICE O/P EST LOW 20 MIN: CPT | Mod: S$GLB,,, | Performed by: PODIATRIST

## 2024-11-25 PROCEDURE — 3066F NEPHROPATHY DOC TX: CPT | Mod: CPTII,S$GLB,, | Performed by: PODIATRIST

## 2024-11-25 PROCEDURE — 3061F NEG MICROALBUMINURIA REV: CPT | Mod: CPTII,S$GLB,, | Performed by: PODIATRIST

## 2024-11-25 PROCEDURE — 3008F BODY MASS INDEX DOCD: CPT | Mod: CPTII,S$GLB,, | Performed by: PODIATRIST

## 2024-11-25 PROCEDURE — 1159F MED LIST DOCD IN RCRD: CPT | Mod: CPTII,S$GLB,, | Performed by: PODIATRIST

## 2024-11-25 PROCEDURE — 3044F HG A1C LEVEL LT 7.0%: CPT | Mod: CPTII,S$GLB,, | Performed by: PODIATRIST

## 2024-11-26 NOTE — PROGRESS NOTES
Subjective:     Patient ID: Christophe Carroll is a 54 y.o. male.    Chief Complaint: Diabetes Mellitus, Diabetic Foot Exam (6/11/24 ALIRIO Forrester), Foot Injury (Right great toenail has blood behind the nail due to a dryer falling on foot in October), and Nail Problem    Christophe is a 54 y.o. male who presents to the clinic upon referral from Dr. Sofia hilario. provider found  for evaluation and treatment of diabetic feet. Christophe has a past medical history of Diabetes mellitus type II and Hypercholesteremia. Presents for diabetic foot risk assessment.     CC#2- Reports recently dropping object on right great toe, reports pain improved.       PCP: Moris Menchaca MD    Date Last Seen by PCP: 6/5/23    Current shoe gear: Tennis shoes    Hemoglobin A1C   Date Value Ref Range Status   06/12/2024 6.7 (H) 4.0 - 5.6 % Final     Comment:     ADA Screening Guidelines:  5.7-6.4%  Consistent with prediabetes  >or=6.5%  Consistent with diabetes    High levels of fetal hemoglobin interfere with the HbA1C  assay. Heterozygous hemoglobin variants (HbS, HgC, etc)do  not significantly interfere with this assay.   However, presence of multiple variants may affect accuracy.     06/07/2024 6.7 (H) 4.0 - 5.6 % Final     Comment:     ADA Screening Guidelines:  5.7-6.4%  Consistent with prediabetes  >or=6.5%  Consistent with diabetes    High levels of fetal hemoglobin interfere with the HbA1C  assay. Heterozygous hemoglobin variants (HbS, HgC, etc)do  not significantly interfere with this assay.   However, presence of multiple variants may affect accuracy.     12/06/2023 7.0 (H) 4.0 - 5.6 % Final     Comment:     ADA Screening Guidelines:  5.7-6.4%  Consistent with prediabetes  >or=6.5%  Consistent with diabetes    High levels of fetal hemoglobin interfere with the HbA1C  assay. Heterozygous hemoglobin variants (HbS, HgC, etc)do  not significantly interfere with this assay.   However, presence of multiple variants may affect accuracy.            Review of Systems   Constitutional: Negative for chills.   Cardiovascular:  Negative for chest pain and claudication.   Respiratory:  Negative for cough.    Skin:  Positive for color change, dry skin and nail changes.   Musculoskeletal:  Positive for joint pain.   Gastrointestinal:  Negative for nausea.   Neurological:  Positive for paresthesias. Negative for numbness.   Psychiatric/Behavioral:  The patient is not nervous/anxious.         Objective:     Physical Exam  Constitutional:       Appearance: He is well-developed.      Comments: Oriented to time, place, and person.   Cardiovascular:      Comments: DP and PT pulses are palpable bilaterally. 3 sec capillary refill time and toes and feet are warm to touch proximally .  There is  hair growth on the feet and toes b/l. There is no edema b/l. No spider veins or varicosities present b/l.     Musculoskeletal:      Comments: Equinus noted b/l ankles with < 10 deg DF noted. MMT 5/5 in DF/PF/Inv/Ev resistance with no reproduction of pain in any direction. Passive range of motion of ankle and pedal joints is painless b/l.     Feet:      Right foot:      Skin integrity: No callus or dry skin.      Left foot:      Skin integrity: No callus or dry skin.   Lymphadenopathy:      Comments: Negative lymphadenopathy bilateral popliteal fossa and tarsal tunnel.   Skin:     Comments: No open lesions, lacerations or wounds noted.Interdigital spaces clean, dry and intact b/l. No erythema noted to b/l foot.  Nails normal color and trophic qualities.  Focal hyperkeratotic lesion consisting entirely of hyperkeratotic tissue without open skin, drainage, pus, fluctuance, malodor, or signs of infection: B/L 5th toe.   Interdigital maceration left 4th     Subungual hematoma right hallux    Neurological:      Mental Status: He is alert.      Comments: Light touch, proprioception, and sharp/dull sensation are all intact bilaterally. Protective threshold with the [place]ienstein  monofilament is intact bilaterally.    Psychiatric:         Behavior: Behavior is cooperative.           Assessment:      Encounter Diagnoses   Name Primary?    Type 2 diabetes mellitus with hyperglycemia, without long-term current use of insulin Yes    Subungual hematoma of toe of right foot, initial encounter        Plan:     Christophe was seen today for diabetes mellitus, diabetic foot exam, foot injury and nail problem.    Diagnoses and all orders for this visit:    Type 2 diabetes mellitus with hyperglycemia, without long-term current use of insulin    Subungual hematoma of toe of right foot, initial encounter        I counseled the patient on his conditions, their implications and medical management.        - Shoe inspection. Diabetic Foot Education. Patient reminded of the importance of good nutrition and blood sugar control to help prevent podiatric complications of diabetes. Patient instructed on proper foot hygeine. We discussed wearing proper shoe gear, daily foot inspections, never walking without protective shoe gear, caution putting sharp instruments to feet     - Discussed DM foot care:  Wear comfortable, proper fitting shoes. Wash feet daily. Dry well. After drying, apply moisturizer to feet (no lotion to webspaces). Inspect feet daily for skin breaks, blisters, swelling, or redness. Wear cotton socks (preferably white)  Change socks every day. Do NOT walk barefoot. Do NOT use heating pads or warm/hot water soaks     CC#2- Stable subungual hematoma . Nail plate firmly adhered with out any onycholysis. No signs of infection nor pain to the region.  Advised patient that no treatment at this time is needed. Patient will continue to monitor the region . Advised that discolored region will slowly grow out with the nail plate.       RTC PRN

## 2024-12-18 DIAGNOSIS — E11.9 TYPE 2 DIABETES MELLITUS WITHOUT COMPLICATION, WITHOUT LONG-TERM CURRENT USE OF INSULIN: Chronic | ICD-10-CM

## 2024-12-18 RX ORDER — DULAGLUTIDE 4.5 MG/.5ML
4.5 INJECTION, SOLUTION SUBCUTANEOUS
Qty: 12 PEN | Refills: 1 | Status: CANCELLED | OUTPATIENT
Start: 2024-12-18 | End: 2025-12-18

## 2024-12-18 NOTE — TELEPHONE ENCOUNTER
Care Due:                  Date            Visit Type   Department     Provider  --------------------------------------------------------------------------------                                EP -                              PRIMARY      [facility]  Last Visit: 04-      CARE ([facility])   MEDICINE       Moris Menchaca  Next Visit: None Scheduled  None         None Found                                                            Last  Test          Frequency    Reason                     Performed    Due Date  --------------------------------------------------------------------------------    CBC.........  12 months..  meloxicam................  06- 05-    CMP.........  12 months..  atorvastatin, meloxicam..  06- 05-    HBA1C.......  6 months...  metFORMIN................  06- 12-    [facility] Embedded Care Due Messages. Reference number: [id number].   12/18/2024 3:49:16 PM CST

## 2024-12-24 ENCOUNTER — LAB VISIT (OUTPATIENT)
Dept: LAB | Facility: HOSPITAL | Age: 54
End: 2024-12-24
Attending: INTERNAL MEDICINE
Payer: COMMERCIAL

## 2024-12-24 DIAGNOSIS — E11.9 TYPE 2 DIABETES MELLITUS WITHOUT COMPLICATION, WITHOUT LONG-TERM CURRENT USE OF INSULIN: Chronic | ICD-10-CM

## 2024-12-24 LAB
ANION GAP SERPL CALC-SCNC: 10 MMOL/L (ref 8–16)
BUN SERPL-MCNC: 8 MG/DL (ref 6–20)
CALCIUM SERPL-MCNC: 9.8 MG/DL (ref 8.7–10.5)
CHLORIDE SERPL-SCNC: 103 MMOL/L (ref 95–110)
CO2 SERPL-SCNC: 26 MMOL/L (ref 23–29)
CREAT SERPL-MCNC: 1 MG/DL (ref 0.5–1.4)
EST. GFR  (NO RACE VARIABLE): >60 ML/MIN/1.73 M^2
ESTIMATED AVG GLUCOSE: 160 MG/DL (ref 68–131)
GLUCOSE SERPL-MCNC: 156 MG/DL (ref 70–110)
HBA1C MFR BLD: 7.2 % (ref 4–5.6)
POTASSIUM SERPL-SCNC: 4.2 MMOL/L (ref 3.5–5.1)
SODIUM SERPL-SCNC: 139 MMOL/L (ref 136–145)

## 2024-12-24 PROCEDURE — 80048 BASIC METABOLIC PNL TOTAL CA: CPT | Performed by: NURSE PRACTITIONER

## 2024-12-24 PROCEDURE — 83036 HEMOGLOBIN GLYCOSYLATED A1C: CPT | Performed by: NURSE PRACTITIONER

## 2024-12-24 PROCEDURE — 36415 COLL VENOUS BLD VENIPUNCTURE: CPT | Mod: PO | Performed by: NURSE PRACTITIONER

## 2024-12-26 ENCOUNTER — PATIENT MESSAGE (OUTPATIENT)
Dept: INTERNAL MEDICINE | Facility: CLINIC | Age: 54
End: 2024-12-26

## 2024-12-26 ENCOUNTER — OFFICE VISIT (OUTPATIENT)
Dept: INTERNAL MEDICINE | Facility: CLINIC | Age: 54
End: 2024-12-26
Payer: COMMERCIAL

## 2024-12-26 DIAGNOSIS — E11.69 HYPERLIPIDEMIA ASSOCIATED WITH TYPE 2 DIABETES MELLITUS: Chronic | ICD-10-CM

## 2024-12-26 DIAGNOSIS — E11.9 TYPE 2 DIABETES MELLITUS WITHOUT COMPLICATION, WITHOUT LONG-TERM CURRENT USE OF INSULIN: Primary | Chronic | ICD-10-CM

## 2024-12-26 DIAGNOSIS — E78.5 HYPERLIPIDEMIA ASSOCIATED WITH TYPE 2 DIABETES MELLITUS: Chronic | ICD-10-CM

## 2024-12-26 PROCEDURE — 3061F NEG MICROALBUMINURIA REV: CPT | Mod: CPTII,95,, | Performed by: NURSE PRACTITIONER

## 2024-12-26 PROCEDURE — 3066F NEPHROPATHY DOC TX: CPT | Mod: CPTII,95,, | Performed by: NURSE PRACTITIONER

## 2024-12-26 PROCEDURE — 3051F HG A1C>EQUAL 7.0%<8.0%: CPT | Mod: CPTII,95,, | Performed by: NURSE PRACTITIONER

## 2024-12-26 PROCEDURE — 1159F MED LIST DOCD IN RCRD: CPT | Mod: CPTII,95,, | Performed by: NURSE PRACTITIONER

## 2024-12-26 PROCEDURE — 1160F RVW MEDS BY RX/DR IN RCRD: CPT | Mod: CPTII,95,, | Performed by: NURSE PRACTITIONER

## 2024-12-26 PROCEDURE — 99214 OFFICE O/P EST MOD 30 MIN: CPT | Mod: 95,,, | Performed by: NURSE PRACTITIONER

## 2024-12-26 RX ORDER — SEMAGLUTIDE 2.68 MG/ML
2 INJECTION, SOLUTION SUBCUTANEOUS
Qty: 3 ML | Refills: 13 | Status: SHIPPED | OUTPATIENT
Start: 2024-12-26 | End: 2025-12-26

## 2024-12-26 NOTE — PROGRESS NOTES
The patient location is: [facility]  The chief complaint leading to consultation is: T2DM, hypergylcemia    Visit type: audiovisual    Face to Face time with patient: 10 minutes  15 minutes of total time spent on the encounter, which includes face to face time and non-face to face time preparing to see the patient (eg, review of tests), Obtaining and/or reviewing separately obtained history, Documenting clinical information in the electronic or other health record, Independently interpreting results (not separately reported) and communicating results to the patient/family/caregiver, or Care coordination (not separately reported).         Each patient to whom he or she provides medical services by telemedicine is:  (1) informed of the relationship between the physician and patient and the respective role of any other health care provider with respect to management of the patient; and (2) notified that he or she may decline to receive medical services by telemedicine and may withdraw from such care at any time.    Notes:   History of Present Illness   Christophe Carroll is a 54 y.o. man with medical history as listed below who presents today for routine follow-up, T2DM. He did have labs prior to our visit with worsening hemoglobin A1c from 6.7 to 7.2. He is taking medications as prescribed without perceived SE. He does monitor his glucose at home with symptoms of hypoglycemia x1 episode with glucose 82. Diet recall: no recent dietary changes and actually exercising more. Complaints today: none. Pertinent negatives as listed in ROS. We will address HM today.      Past Medical History:   Diagnosis Date    Diabetes mellitus type II     Hypercholesteremia        Past Surgical History:   Procedure Laterality Date    COLONOSCOPY N/A 10/12/2023    Procedure: COLONOSCOPY;  Surgeon: Tanna Powers MD;  Location: [facility];  Service: Endoscopy;  Laterality: N/A;  instructions sent to patient  [email or web address]  referral; Dr. Menchaca  Pt diabetic: Takes Trulicity ( instructed to hold for 7 days)  ally Munguia to [facility]    removal of epididymal cyst         Social History     Socioeconomic History    Marital status:    Occupational History     Employer: [employer]   Tobacco Use    Smoking status: Never    Smokeless tobacco: Never   Substance and Sexual Activity    Alcohol use: Yes     Comment: On occasion...not even monthly. Mostly social gatherings.    Drug use: No    Sexual activity: Yes     Partners: Female     Comment:      Social Drivers of Health     Financial Resource Strain: Low Risk  (4/30/2024)    Overall Financial Resource Strain (CARDIA)     Difficulty of Paying Living Expenses: Not hard at all   Food Insecurity: No Food Insecurity (4/30/2024)    Hunger Vital Sign     Worried About Running Out of Food in the Last Year: Never true     Ran Out of Food in the Last Year: Never true   Transportation Needs: No Transportation Needs (4/30/2024)    PRAPARE - Transportation     Lack of Transportation (Medical): No     Lack of Transportation (Non-Medical): No   Physical Activity: Sufficiently Active (4/30/2024)    Exercise Vital Sign     Days of Exercise per Week: 5 days     Minutes of Exercise per Session: 50 min   Stress: No Stress Concern Present (4/30/2024)    [employer] [place] [employer] - Occupational Stress Questionnaire     Feeling of Stress : Only a little   Housing Stability: Unknown (4/30/2024)    Housing Stability Vital Sign     Unable to Pay for Housing in the Last Year: No       Family History   Problem Relation Name Age of Onset    Diabetes Mother Will Khanna     Cancer Mother Will Khanna         lung with mets to brain    Diabetes Maternal Aunt Moni Osmani     Kidney disease Maternal Aunt Moni Keen     Diabetes Maternal Grandmother Page Osmani        Review of Systems  Review of Systems   Constitutional:  Negative for malaise/fatigue and weight  loss.   Eyes:  Negative for blurred vision and double vision.   Respiratory:  Negative for shortness of breath.    Cardiovascular:  Negative for chest pain, palpitations, orthopnea, claudication, leg swelling and PND.   Gastrointestinal:  Negative for blood in stool and melena.   Genitourinary:  Negative for flank pain and hematuria.   Neurological:  Negative for dizziness, tremors, seizures, loss of consciousness, weakness and headaches.   Endo/Heme/Allergies:  Negative for polydipsia.   Psychiatric/Behavioral:  The patient is not nervous/anxious.        A complete review of systems was otherwise negative.    Physical Exam  General appearance: alert, appears stated age, cooperative, and no distress  Lungs:  respirations are even and unlabored  Skin:  no visible rash or lesions  Neurologic: Grossly normal    Assessment/Plan  Type 2 diabetes mellitus without complication, without long-term current use of insulin  Stop Trulicity, start Ozempic.  Consider setting alarm to remember evening dose of Metformin (although not missing regularly).  Labs and follow-up in 3 months.  -     Hemoglobin A1C; Future; Expected date: 03/26/2025  -     BASIC METABOLIC PANEL; Future; Expected date: 12/26/2024  -     semaglutide (OZEMPIC) 2 mg/dose (8 mg/3 mL) PnIj; Inject 2 mg into the skin every 7 days.  Dispense: 3 mL; Refill: 13    Hyperlipidemia associated with type 2 diabetes mellitus  The current medical regimen is effective;  continue present plan and medications.  The patient is asked to make an attempt to improve diet and exercise patterns to aid in medical management of this problem.  Labs and follow-up in 3 months.    Patient has verbalized understanding and is in agreement with plan of care.    Follow up in about 3 months (around 3/26/2025).      Answers submitted by the patient for this visit:  Diabetes Questionnaire (Submitted on 12/26/2024)  Chief Complaint: Diabetes problem  Diabetes type: type 2  MedicAlert ID:  No  Disease duration: 2010 Years  fatigue: No  foot paresthesias: No  foot ulcerations: No  polyphagia: No  polyuria: No  visual change: No  Symptom course: stable  confusion: No  speech difficulty: No  hunger: No  mood changes: No  pallor: No  sleepiness: Yes  sweats: No  blackouts: No  hospitalization: No  nocturnal hypoglycemia: No  required assistance: No  required glucagon: No  CVA: No  heart disease: No  impotence: Yes  nephropathy: No  peripheral neuropathy: No  PVD: No  retinopathy: No  autonomic neuropathy: No  CAD risks: no known risk factors  Current treatments: insulin injections, oral agent (monotherapy)  Treatment compliance: all of the time  Dose schedule: pre-lunch  Given by: patient  Injection sites: thighs  Home blood tests: 1-2 x per week  Home urines: <1 x per month  Monitoring compliance: fair  Blood glucose trend: no change  Weight trend: stable  Current diet: generally healthy  Meal planning: none  Exercise: every other day  Dietitian visit: No  Eye exam current: Yes  Sees podiatrist: Yes

## 2024-12-26 NOTE — PROGRESS NOTES
Your A1c is up a bit.  I would recommend a virtual visit with Aaliyah or I to discuss a gameplan.  We may want to switch to Mounjaro or Ozempic instead of Trulicity.     Sincerely,  Moris Menchaca MD

## 2025-01-29 DIAGNOSIS — E78.5 HYPERLIPIDEMIA ASSOCIATED WITH TYPE 2 DIABETES MELLITUS: Chronic | ICD-10-CM

## 2025-01-29 DIAGNOSIS — E11.69 HYPERLIPIDEMIA ASSOCIATED WITH TYPE 2 DIABETES MELLITUS: Chronic | ICD-10-CM

## 2025-01-30 RX ORDER — ATORVASTATIN CALCIUM 10 MG/1
10 TABLET, FILM COATED ORAL
Qty: 90 TABLET | Refills: 0 | Status: SHIPPED | OUTPATIENT
Start: 2025-01-30

## 2025-01-30 NOTE — TELEPHONE ENCOUNTER
Care Due:                  Date            Visit Type   Department     Provider  --------------------------------------------------------------------------------                                EP -                              PRIMARY      [facility]  Last Visit: 04-      CARE ([facility])   MEDICINE       Moris Menchaca  Next Visit: None Scheduled  None         None Found                                                            Last  Test          Frequency    Reason                     Performed    Due Date  --------------------------------------------------------------------------------    Office Visit  12 months..  meloxicam................  04- 04-    [facility] Embedded Care Due Messages. Reference number: [id number].   1/29/2025 6:31:36 PM CST

## 2025-01-30 NOTE — TELEPHONE ENCOUNTER
Refill Routing Note   Medication(s) are not appropriate for processing by [facility] for the following reason(s):        Required labs outdated    [facility] action(s):  Defer   Requires appointment : Yes             Appointments  past 12m or future 3m with PCP    Date Provider   Last Visit   4/30/2024 Moris Menchaca MD   Next Visit   Visit date not found Moris Menchaca MD   ED visits in past 90 days: 0        Note composed:2:37 PM 01/30/2025

## 2025-02-05 NOTE — PROGRESS NOTES
Your lab results are showing the cholesterol is slightly elevated still.  Your A1c has improved slightly and is nearly at goal.  We have 2 options and I would like to know how you would like to proceed.    We can start a once daily cholesterol medication and add another diabetes medication.  The other options would be to really get after the diet and exercise to attempt to improve these.      We will need to recheck things in 3 months either way.  Please call the clinic to let me know which you would prefer and to book your 3 moth follow up with labs 2-3 days prior.      Sincerely,  Moris Menchaca  [email or web address]  
[[id number]]

## 2025-02-28 DIAGNOSIS — E11.9 TYPE 2 DIABETES MELLITUS WITHOUT COMPLICATION, WITHOUT LONG-TERM CURRENT USE OF INSULIN: Chronic | ICD-10-CM

## 2025-02-28 RX ORDER — METFORMIN HYDROCHLORIDE 500 MG/1
1000 TABLET, EXTENDED RELEASE ORAL 2 TIMES DAILY WITH MEALS
Qty: 360 TABLET | Refills: 0 | Status: SHIPPED | OUTPATIENT
Start: 2025-02-28

## 2025-02-28 NOTE — TELEPHONE ENCOUNTER
Provider Staff:  Action required for this patient    Requires labs      Please see care gap opportunities below in Care Due Message.    Thanks!  [facility]     Appointments      Date Provider   Last Visit   4/30/2024 Moris Menchaca MD   Next Visit   Visit date not found Moris Menchaca MD     Refill Decision Note   Christophe Carroll  is requesting a refill authorization.  Brief Assessment and Rationale for Refill:  Approve     Medication Therapy Plan:         Comments:     Note composed:5:55 PM 02/28/2025

## 2025-02-28 NOTE — TELEPHONE ENCOUNTER
Care Due:                  Date            Visit Type   Department     Provider  --------------------------------------------------------------------------------                                EP -                              PRIMARY      [facility]  Last Visit: 04-      CARE ([facility])   MEDICINE       Moris Menchaca  Next Visit: None Scheduled  None         None Found                                                            Last  Test          Frequency    Reason                     Performed    Due Date  --------------------------------------------------------------------------------    CBC.........  12 months..  meloxicam................  06- 05-    CMP.........  12 months..  meloxicam................  06- 05-    [facility] [employer] Embedded Care Due Messages. Reference number: [id number].   2/28/2025 5:45:28 PM CST

## 2025-04-10 DIAGNOSIS — E11.69 HYPERLIPIDEMIA ASSOCIATED WITH TYPE 2 DIABETES MELLITUS: Chronic | ICD-10-CM

## 2025-04-10 DIAGNOSIS — E78.5 HYPERLIPIDEMIA ASSOCIATED WITH TYPE 2 DIABETES MELLITUS: Chronic | ICD-10-CM

## 2025-04-10 RX ORDER — ATORVASTATIN CALCIUM 10 MG/1
10 TABLET, FILM COATED ORAL
Qty: 90 TABLET | Refills: 0 | Status: SHIPPED | OUTPATIENT
Start: 2025-04-10

## 2025-04-10 NOTE — TELEPHONE ENCOUNTER
Refill Routing Note   Medication(s) are not appropriate for processing by [facility] for the following reason(s):        Required labs outdated    [facility] action(s):  Defer   Requires appointment : Yes     Requires labs : Yes    Medication Therapy Plan: Lab(s) recommended: A1c      Appointments  past 12m or future 3m with PCP    Date Provider   Last Visit   4/30/2024 Moris Menchaca MD   Next Visit   Visit date not found Moris Menchaca MD   ED visits in past 90 days: 0        Note composed:9:54 AM 04/10/2025

## 2025-04-10 NOTE — TELEPHONE ENCOUNTER
Care Due:                  Date            Visit Type   Department     Provider  --------------------------------------------------------------------------------                                EP -                              PRIMARY      [facility]  Last Visit: 04-      CARE ([facility])   MEDICINE       Moris Menchaca  Next Visit: None Scheduled  None         None Found                                                            Last  Test          Frequency    Reason                     Performed    Due Date  --------------------------------------------------------------------------------    Office Visit  12 months..  meloxicam................  04- 04-    HBA1C.......  6 months...  metFORMIN................  12- 06-    [employer] Embedded Care Due Messages. Reference number: [id number].   4/10/2025 8:57:49 AM CDT

## 2025-05-16 ENCOUNTER — LAB VISIT (OUTPATIENT)
Dept: LAB | Facility: HOSPITAL | Age: 55
End: 2025-05-16
Attending: INTERNAL MEDICINE
Payer: COMMERCIAL

## 2025-05-16 DIAGNOSIS — E11.9 TYPE 2 DIABETES MELLITUS WITHOUT COMPLICATION, WITHOUT LONG-TERM CURRENT USE OF INSULIN: ICD-10-CM

## 2025-05-16 LAB
ALBUMIN/CREAT UR: 2.7 UG/MG
CREAT UR-MCNC: 334 MG/DL (ref 23–375)
EAG (OHS): 140 MG/DL (ref 68–131)
HBA1C MFR BLD: 6.5 % (ref 4–5.6)
MICROALBUMIN UR-MCNC: 9 UG/ML (ref ?–5000)

## 2025-05-16 PROCEDURE — 83036 HEMOGLOBIN GLYCOSYLATED A1C: CPT

## 2025-05-16 PROCEDURE — 36415 COLL VENOUS BLD VENIPUNCTURE: CPT | Mod: PO

## 2025-05-16 PROCEDURE — 82570 ASSAY OF URINE CREATININE: CPT

## 2025-06-18 NOTE — ASSESSMENT & PLAN NOTE
Counseled on need to get back into healthier eating habits which he is motivated to do.  Continue metformin XR full dose.  Will contact the [facility] today about his Trulicity   I have personally seen and examined the patient. I have collaborated with and supervised the